# Patient Record
Sex: MALE | Race: WHITE
[De-identification: names, ages, dates, MRNs, and addresses within clinical notes are randomized per-mention and may not be internally consistent; named-entity substitution may affect disease eponyms.]

---

## 2017-05-15 ENCOUNTER — HOSPITAL ENCOUNTER (INPATIENT)
Dept: HOSPITAL 61 - ER | Age: 67
LOS: 2 days | Discharge: HOME | DRG: 605 | End: 2017-05-17
Attending: FAMILY MEDICINE | Admitting: FAMILY MEDICINE
Payer: COMMERCIAL

## 2017-05-15 VITALS — SYSTOLIC BLOOD PRESSURE: 117 MMHG | DIASTOLIC BLOOD PRESSURE: 79 MMHG

## 2017-05-15 VITALS — DIASTOLIC BLOOD PRESSURE: 81 MMHG | SYSTOLIC BLOOD PRESSURE: 104 MMHG

## 2017-05-15 VITALS — WEIGHT: 305.38 LBS | BODY MASS INDEX: 37.19 KG/M2 | HEIGHT: 76 IN

## 2017-05-15 DIAGNOSIS — R09.02: ICD-10-CM

## 2017-05-15 DIAGNOSIS — I48.2: ICD-10-CM

## 2017-05-15 DIAGNOSIS — I35.1: ICD-10-CM

## 2017-05-15 DIAGNOSIS — I25.10: ICD-10-CM

## 2017-05-15 DIAGNOSIS — E11.42: ICD-10-CM

## 2017-05-15 DIAGNOSIS — S00.03XA: Primary | ICD-10-CM

## 2017-05-15 DIAGNOSIS — I09.9: ICD-10-CM

## 2017-05-15 DIAGNOSIS — N28.1: ICD-10-CM

## 2017-05-15 DIAGNOSIS — I11.0: ICD-10-CM

## 2017-05-15 DIAGNOSIS — E78.5: ICD-10-CM
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
## 2017-05-15 DIAGNOSIS — Z82.49: ICD-10-CM

## 2017-05-15 DIAGNOSIS — I34.0: ICD-10-CM

## 2017-05-15 DIAGNOSIS — I50.9: ICD-10-CM
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
## 2017-05-15 DIAGNOSIS — Z88.0: ICD-10-CM

## 2017-05-15 DIAGNOSIS — M19.90: ICD-10-CM

## 2017-05-15 LAB
ALBUMIN SERPL-MCNC: 3.5 G/DL (ref 3.4–5)
ALBUMIN/GLOB SERPL: 1 {RATIO} (ref 1–1.7)
ALP SERPL-CCNC: 75 U/L (ref 46–116)
ALT SERPL-CCNC: 20 U/L (ref 16–63)
ANION GAP SERPL CALC-SCNC: 7 MMOL/L (ref 6–14)
APTT BLD: 29 SEC (ref 24–38)
AST SERPL-CCNC: 15 U/L (ref 15–37)
BACTERIA #/AREA URNS HPF: 0 /HPF
BARBITURATES UR-MCNC: (no result) UG/ML
BASOPHILS # BLD AUTO: 0.1 X10^3/UL (ref 0–0.2)
BASOPHILS NFR BLD: 1 % (ref 0–3)
BENZODIAZ UR-MCNC: (no result) UG/L
BILIRUB SERPL-MCNC: 0.4 MG/DL (ref 0.2–1)
BILIRUB UR QL STRIP: NEGATIVE
BUN SERPL-MCNC: 40 MG/DL (ref 8–26)
BUN/CREAT SERPL: 31 (ref 6–20)
CALCIUM SERPL-MCNC: 9.1 MG/DL (ref 8.5–10.1)
CANNABINOIDS UR-MCNC: (no result) UG/L
CHLORIDE SERPL-SCNC: 103 MMOL/L (ref 98–107)
CK SERPL-CCNC: 68 U/L (ref 39–308)
CO2 SERPL-SCNC: 31 MMOL/L (ref 21–32)
COCAINE UR-MCNC: (no result) NG/ML
CREAT SERPL-MCNC: 1.3 MG/DL (ref 0.7–1.3)
EOSINOPHIL NFR BLD: 3 % (ref 0–3)
ERYTHROCYTE [DISTWIDTH] IN BLOOD BY AUTOMATED COUNT: 14.4 % (ref 11.5–14.5)
ETHANOL SERPL-MCNC: < 10 MG/DL (ref 0–10)
GFR SERPLBLD BASED ON 1.73 SQ M-ARVRAT: 54.9 ML/MIN
GLOBULIN SER-MCNC: 3.6 G/DL (ref 2.2–3.8)
GLUCOSE SERPL-MCNC: 125 MG/DL (ref 70–99)
GLUCOSE UR STRIP-MCNC: NEGATIVE MG/DL
HCT VFR BLD CALC: 32.7 % (ref 39–53)
HGB BLD-MCNC: 11.1 G/DL (ref 13–17.5)
INR PPP: 1.2 (ref 0.8–1.1)
LYMPHOCYTES # BLD: 1 X10^3/UL (ref 1–4.8)
LYMPHOCYTES NFR BLD AUTO: 14 % (ref 24–48)
MAGNESIUM SERPL-MCNC: 1.6 MG/DL (ref 1.8–2.4)
MCH RBC QN AUTO: 29 PG (ref 25–35)
MCHC RBC AUTO-ENTMCNC: 34 G/DL (ref 31–37)
MCV RBC AUTO: 86 FL (ref 79–100)
METHADONE SERPL-MCNC: (no result) NG/ML
MONOCYTES NFR BLD: 8 % (ref 0–9)
NEUTROPHILS NFR BLD AUTO: 74 % (ref 31–73)
NITRITE UR QL STRIP: NEGATIVE
OPIATES UR-MCNC: (no result) NG/ML
PCP SERPL-MCNC: (no result) MG/DL
PH UR STRIP: 5 [PH]
PLATELET # BLD AUTO: 183 X10^3/UL (ref 140–400)
POTASSIUM SERPL-SCNC: 4.3 MMOL/L (ref 3.5–5.1)
PROT SERPL-MCNC: 7.1 G/DL (ref 6.4–8.2)
PROT UR STRIP-MCNC: NEGATIVE MG/DL
PROTHROMBIN TIME: 14.3 SEC (ref 11.7–14)
RBC # BLD AUTO: 3.82 X10^6/UL (ref 4.3–5.7)
RBC #/AREA URNS HPF: (no result) /HPF (ref 0–2)
SODIUM SERPL-SCNC: 141 MMOL/L (ref 136–145)
SP GR UR STRIP: 1.01
SQUAMOUS #/AREA URNS LPF: (no result) /LPF
UROBILINOGEN UR-MCNC: 0.2 MG/DL
WBC # BLD AUTO: 7 X10^3/UL (ref 4–11)
WBC #/AREA URNS HPF: (no result) /HPF (ref 0–4)

## 2017-05-15 PROCEDURE — 84484 ASSAY OF TROPONIN QUANT: CPT

## 2017-05-15 PROCEDURE — 85730 THROMBOPLASTIN TIME PARTIAL: CPT

## 2017-05-15 PROCEDURE — 80053 COMPREHEN METABOLIC PANEL: CPT

## 2017-05-15 PROCEDURE — 83735 ASSAY OF MAGNESIUM: CPT

## 2017-05-15 PROCEDURE — 84443 ASSAY THYROID STIM HORMONE: CPT

## 2017-05-15 PROCEDURE — 96374 THER/PROPH/DIAG INJ IV PUSH: CPT

## 2017-05-15 PROCEDURE — 93005 ELECTROCARDIOGRAM TRACING: CPT

## 2017-05-15 PROCEDURE — 36415 COLL VENOUS BLD VENIPUNCTURE: CPT

## 2017-05-15 PROCEDURE — 70450 CT HEAD/BRAIN W/O DYE: CPT

## 2017-05-15 PROCEDURE — 83690 ASSAY OF LIPASE: CPT

## 2017-05-15 PROCEDURE — 85610 PROTHROMBIN TIME: CPT

## 2017-05-15 PROCEDURE — 81001 URINALYSIS AUTO W/SCOPE: CPT

## 2017-05-15 PROCEDURE — 71275 CT ANGIOGRAPHY CHEST: CPT

## 2017-05-15 PROCEDURE — 83605 ASSAY OF LACTIC ACID: CPT

## 2017-05-15 PROCEDURE — 82947 ASSAY GLUCOSE BLOOD QUANT: CPT

## 2017-05-15 PROCEDURE — 85027 COMPLETE CBC AUTOMATED: CPT

## 2017-05-15 PROCEDURE — 83880 ASSAY OF NATRIURETIC PEPTIDE: CPT

## 2017-05-15 PROCEDURE — 74177 CT ABD & PELVIS W/CONTRAST: CPT

## 2017-05-15 PROCEDURE — 80048 BASIC METABOLIC PNL TOTAL CA: CPT

## 2017-05-15 PROCEDURE — 82550 ASSAY OF CK (CPK): CPT

## 2017-05-15 PROCEDURE — 72125 CT NECK SPINE W/O DYE: CPT

## 2017-05-15 RX ADMIN — FENTANYL CITRATE PRN MCG: 50 INJECTION INTRAMUSCULAR; INTRAVENOUS at 15:43
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
## 2017-05-15 NOTE — ACF
Admission Forms Criteria


                                           MENTAL STATUS CHANGE





   Clinical Indications for Inpatient Care    


                                                                     


                                                                 (Place 'X' for 

any and all applicable criteria):  





Ongoing inpatient care may be needed for 1 or more of the following(1)(2)(3)(5)(

6): 





[X]I.    Suspected serious etiology (eg, medical disorder, CNS event) of 

altered mental status


[ ]II.   Danger to self or others not manageable at lower level of care


[ ]III.  Grave disability (eg, inability to perform self care necessary at 

lower level of care)


[ ]IV. Agitation or inappropriate behavior interfering with care for primary 

condition (eg, attempting to discontinue


        lines or drains prematurely, unable to cooperate with respiratory care)


[ ]V.  Delirium [A] [D][E] as described by 1 or more of the following(26):


         [ ]a)  Delirium due to alcohol or sedative [F] withdrawal


         [ ]b)  Delirium of uncertain etiology that has not responded to 

appropriate empiric treatment


         [ ]c)  Delirium that prevents performance of a life-sustaining 

function (eg, feeding or hydrating oneself)





[ ]VI.  General contraindications and/or Inappropriate clinical situations for 

Observational Care in patients


          with Mental Status Change, when ANY ONE of the following is required:


          [ ]a)   Prediction of prolongation of LOS based on ANY ONE of the 

following may be considered as 


                    a contraindication for observational care 2, 3, 4, 5, 6, 7, 

8, 9, 10, 11


                    [ ]i)    Age > 65 yrs.


                    [ ]ii)   Patient arriving by ambulance


                    [ ]iii)  Patient with high acuity


                    [ ]iv)  Patient requiring vital sign monitoring


                    [ ]v)   Patient on IV medication


          [ ]b)   Systolic blood pressures  greater than or equal to 180mmHg 3,

12


          [ ]c)   Patient with altered mental status including delirium and 

other alteration of consciousness, (3)


          [ ]d)   Patient whose discharge disposition will be to a skilled 

nursing home or rehabilitation home should 


                   not be managed in Emergency Department Observation Unit. CMS 

rule requires 3 days hospital stay before such placement.3,13


          [ ]e)   Patient with failure to thrive due to broad array of 

etiologies 3,16,17


          [ ]f)   Inability to ambulate 3,14








Extended stay beyond goal length of stay for the primary condition may be 

needed until ALL of the following are present(3)(5):


[ ]a)  Underlying medical etiology of mental status change is absent, or has 

been established and adequately treated


[ ]b)  Danger to self or others is absent or manageable at lower level of care.


[ ]c)  Behavior crisis management, including physical or chemical restraints, 

is not required or available at lower level of car


[ ]d)  Substance or alcohol withdrawal is absent or manageable at lower level 

of care.


[ ]e)  Behavioral symptoms (eg, agitation, somnolence, inappropriate behavior) 

are absent, or are manageable at lower level of care.








The original [facility] [employer] content created by [facility] has been revised. 


The portions of the content which have been revised are identified through the 

use of italic text or in bold, and [facility] 


has neither reviewed nor approved the modified material. All other unmodified 

content is copyright  [employer] [facility].





Please see references footnoted in the original [employer] edition 

2016


Admission Criteria Met?:  Yes











NIK OLVERA May 15, 2017 14:50

## 2017-05-15 NOTE — EKG
[facility]

              [address] [place] [address]

Test Date:    2017-05-15               Test Time:    10:35:46

Pat Name:     NIKOLAI GOODRICH         Department:   

Patient ID:   [id number]           Room:         [facility]

Gender:       M                        Technician:   

:          [id number]               Requested By: DAPHNEY NAIR

Order Number: [id number]            Reading MD:   Aren Hankins

                                 Measurements

Intervals                              Axis          

Rate:         111                      P:            

CT:                                    QRS:          -67

QRSD:         110                      T:            7

QT:           368                                    

QTc:          504                                    

                           Interpretive Statements

ATRIAL FLUTTER

RBBB

POOR RWAVE PROGRESSION

CANNOT RULE OUT LATERAL WALL INFARCT



Electronically Signed On 2017 10:44:36 CDT by Aren Hankins

## 2017-05-16 VITALS — DIASTOLIC BLOOD PRESSURE: 80 MMHG | SYSTOLIC BLOOD PRESSURE: 111 MMHG

## 2017-05-16 VITALS — SYSTOLIC BLOOD PRESSURE: 107 MMHG | DIASTOLIC BLOOD PRESSURE: 83 MMHG

## 2017-05-16 VITALS — SYSTOLIC BLOOD PRESSURE: 140 MMHG | DIASTOLIC BLOOD PRESSURE: 103 MMHG

## 2017-05-16 VITALS — SYSTOLIC BLOOD PRESSURE: 140 MMHG | DIASTOLIC BLOOD PRESSURE: 68 MMHG

## 2017-05-16 VITALS — DIASTOLIC BLOOD PRESSURE: 84 MMHG | SYSTOLIC BLOOD PRESSURE: 113 MMHG

## 2017-05-16 VITALS — SYSTOLIC BLOOD PRESSURE: 128 MMHG | DIASTOLIC BLOOD PRESSURE: 93 MMHG

## 2017-05-16 VITALS — DIASTOLIC BLOOD PRESSURE: 78 MMHG | SYSTOLIC BLOOD PRESSURE: 117 MMHG

## 2017-05-16 LAB
ANION GAP SERPL CALC-SCNC: 3 MMOL/L (ref 6–14)
BASOPHILS # BLD AUTO: 0 X10^3/UL (ref 0–0.2)
BASOPHILS NFR BLD: 1 % (ref 0–3)
BUN SERPL-MCNC: 35 MG/DL (ref 8–26)
CALCIUM SERPL-MCNC: 8.7 MG/DL (ref 8.5–10.1)
CHLORIDE SERPL-SCNC: 104 MMOL/L (ref 98–107)
CO2 SERPL-SCNC: 34 MMOL/L (ref 21–32)
CREAT SERPL-MCNC: 1.2 MG/DL (ref 0.7–1.3)
EOSINOPHIL NFR BLD: 3 % (ref 0–3)
ERYTHROCYTE [DISTWIDTH] IN BLOOD BY AUTOMATED COUNT: 14.4 % (ref 11.5–14.5)
GFR SERPLBLD BASED ON 1.73 SQ M-ARVRAT: 60.2 ML/MIN
GLUCOSE SERPL-MCNC: 132 MG/DL (ref 70–99)
HCT VFR BLD CALC: 30.2 % (ref 39–53)
HGB BLD-MCNC: 10.2 G/DL (ref 13–17.5)
LYMPHOCYTES # BLD: 1.2 X10^3/UL (ref 1–4.8)
LYMPHOCYTES NFR BLD AUTO: 18 % (ref 24–48)
MCH RBC QN AUTO: 29 PG (ref 25–35)
MCHC RBC AUTO-ENTMCNC: 34 G/DL (ref 31–37)
MCV RBC AUTO: 85 FL (ref 79–100)
MONOCYTES NFR BLD: 8 % (ref 0–9)
NEUTROPHILS NFR BLD AUTO: 70 % (ref 31–73)
PLATELET # BLD AUTO: 152 X10^3/UL (ref 140–400)
POTASSIUM SERPL-SCNC: 4.1 MMOL/L (ref 3.5–5.1)
RBC # BLD AUTO: 3.54 X10^6/UL (ref 4.3–5.7)
SODIUM SERPL-SCNC: 141 MMOL/L (ref 136–145)
WBC # BLD AUTO: 6.5 X10^3/UL (ref 4–11)

## 2017-05-16 RX ADMIN — ACETAMINOPHEN SCH MG: 650 SOLUTION ORAL at 16:54

## 2017-05-16 RX ADMIN — SOTALOL HYDROCHLORIDE SCH MG: 80 TABLET ORAL at 09:05

## 2017-05-16 RX ADMIN — ACETAMINOPHEN SCH MG: 650 SOLUTION ORAL at 12:00

## 2017-05-16 RX ADMIN — POTASSIUM CHLORIDE SCH MEQ: 1500 TABLET, EXTENDED RELEASE ORAL at 16:54

## 2017-05-16 RX ADMIN — ACETAMINOPHEN SCH MG: 650 SOLUTION ORAL at 09:04

## 2017-05-16 RX ADMIN — PANTOPRAZOLE SODIUM SCH MG: 40 TABLET, DELAYED RELEASE ORAL at 16:54

## 2017-05-16 RX ADMIN — SERTRALINE HYDROCHLORIDE SCH MG: 50 TABLET ORAL at 09:06

## 2017-05-16 RX ADMIN — FUROSEMIDE SCH MG: 80 TABLET ORAL at 09:05

## 2017-05-16 RX ADMIN — ACETAMINOPHEN SCH MG: 650 SOLUTION ORAL at 20:37

## 2017-05-16 RX ADMIN — SOTALOL HYDROCHLORIDE SCH MG: 80 TABLET ORAL at 20:38

## 2017-05-16 RX ADMIN — FENTANYL CITRATE PRN MCG: 50 INJECTION INTRAMUSCULAR; INTRAVENOUS at 05:27

## 2017-05-16 RX ADMIN — POTASSIUM CHLORIDE SCH MEQ: 1500 TABLET, EXTENDED RELEASE ORAL at 09:06

## 2017-05-16 RX ADMIN — PANTOPRAZOLE SODIUM SCH MG: 40 TABLET, DELAYED RELEASE ORAL at 09:05
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
## 2017-05-17 VITALS — DIASTOLIC BLOOD PRESSURE: 85 MMHG | SYSTOLIC BLOOD PRESSURE: 130 MMHG

## 2017-05-17 VITALS — SYSTOLIC BLOOD PRESSURE: 149 MMHG | DIASTOLIC BLOOD PRESSURE: 101 MMHG

## 2017-05-17 VITALS — DIASTOLIC BLOOD PRESSURE: 101 MMHG | SYSTOLIC BLOOD PRESSURE: 149 MMHG

## 2017-05-17 RX ADMIN — SOTALOL HYDROCHLORIDE SCH MG: 80 TABLET ORAL at 08:05

## 2017-05-17 RX ADMIN — FUROSEMIDE SCH MG: 80 TABLET ORAL at 08:03

## 2017-05-17 RX ADMIN — PANTOPRAZOLE SODIUM SCH MG: 40 TABLET, DELAYED RELEASE ORAL at 08:04

## 2017-05-17 RX ADMIN — SERTRALINE HYDROCHLORIDE SCH MG: 50 TABLET ORAL at 08:04

## 2017-05-17 RX ADMIN — ACETAMINOPHEN SCH MG: 650 SOLUTION ORAL at 08:03

## 2017-05-17 RX ADMIN — POTASSIUM CHLORIDE SCH MEQ: 1500 TABLET, EXTENDED RELEASE ORAL at 08:03

## 2017-05-17 RX ADMIN — ACETAMINOPHEN SCH MG: 650 SOLUTION ORAL at 00:00

## 2017-05-17 RX ADMIN — ACETAMINOPHEN SCH MG: 650 SOLUTION ORAL at 03:35
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
## 2017-05-17 NOTE — DISCH
DISCHARGE INSTRUCTIONS


Condition on Discharge


Condition on Discharge:  Stable





Activity After Discharge


Activity Instructions for Disc:  No restrictions





Diet after Discharge


Diet after Discharge:  Cardiac





Follow-Up


Follow up with:  as scheduled











DAPHNEY NAIR MD May 17, 2017 08:20

## 2017-06-03 ENCOUNTER — HOSPITAL ENCOUNTER (INPATIENT)
Dept: HOSPITAL 61 - ER | Age: 67
LOS: 11 days | Discharge: HOME HEALTH | DRG: 252 | End: 2017-06-14
Attending: FAMILY MEDICINE | Admitting: FAMILY MEDICINE
Payer: MEDICARE

## 2017-06-03 VITALS — DIASTOLIC BLOOD PRESSURE: 79 MMHG | SYSTOLIC BLOOD PRESSURE: 111 MMHG

## 2017-06-03 VITALS — DIASTOLIC BLOOD PRESSURE: 86 MMHG | SYSTOLIC BLOOD PRESSURE: 127 MMHG

## 2017-06-03 VITALS — SYSTOLIC BLOOD PRESSURE: 111 MMHG | DIASTOLIC BLOOD PRESSURE: 79 MMHG

## 2017-06-03 VITALS — DIASTOLIC BLOOD PRESSURE: 96 MMHG | SYSTOLIC BLOOD PRESSURE: 123 MMHG

## 2017-06-03 VITALS — DIASTOLIC BLOOD PRESSURE: 76 MMHG | SYSTOLIC BLOOD PRESSURE: 112 MMHG

## 2017-06-03 VITALS — DIASTOLIC BLOOD PRESSURE: 88 MMHG | SYSTOLIC BLOOD PRESSURE: 132 MMHG

## 2017-06-03 VITALS — DIASTOLIC BLOOD PRESSURE: 71 MMHG | SYSTOLIC BLOOD PRESSURE: 110 MMHG

## 2017-06-03 VITALS — DIASTOLIC BLOOD PRESSURE: 82 MMHG | SYSTOLIC BLOOD PRESSURE: 100 MMHG

## 2017-06-03 VITALS — WEIGHT: 300.12 LBS | HEIGHT: 75 IN | BODY MASS INDEX: 37.32 KG/M2

## 2017-06-03 DIAGNOSIS — Z79.2: ICD-10-CM

## 2017-06-03 DIAGNOSIS — Z79.1: ICD-10-CM

## 2017-06-03 DIAGNOSIS — E78.5: ICD-10-CM

## 2017-06-03 DIAGNOSIS — I48.92: ICD-10-CM

## 2017-06-03 DIAGNOSIS — R29.6: ICD-10-CM

## 2017-06-03 DIAGNOSIS — N17.0: ICD-10-CM

## 2017-06-03 DIAGNOSIS — N18.9: ICD-10-CM

## 2017-06-03 DIAGNOSIS — I35.1: ICD-10-CM

## 2017-06-03 DIAGNOSIS — Z79.84: ICD-10-CM

## 2017-06-03 DIAGNOSIS — E66.9: ICD-10-CM

## 2017-06-03 DIAGNOSIS — Z79.899: ICD-10-CM

## 2017-06-03 DIAGNOSIS — Z79.82: ICD-10-CM

## 2017-06-03 DIAGNOSIS — R55: ICD-10-CM

## 2017-06-03 DIAGNOSIS — J40: ICD-10-CM

## 2017-06-03 DIAGNOSIS — I25.10: ICD-10-CM

## 2017-06-03 DIAGNOSIS — Z87.891: ICD-10-CM

## 2017-06-03 DIAGNOSIS — I95.9: ICD-10-CM

## 2017-06-03 DIAGNOSIS — I21.4: Primary | ICD-10-CM

## 2017-06-03 DIAGNOSIS — I47.2: ICD-10-CM

## 2017-06-03 DIAGNOSIS — I50.9: ICD-10-CM

## 2017-06-03 DIAGNOSIS — I48.2: ICD-10-CM

## 2017-06-03 DIAGNOSIS — Z82.49: ICD-10-CM

## 2017-06-03 DIAGNOSIS — E11.22: ICD-10-CM

## 2017-06-03 DIAGNOSIS — I13.0: ICD-10-CM

## 2017-06-03 DIAGNOSIS — Z88.0: ICD-10-CM

## 2017-06-03 DIAGNOSIS — I45.89: ICD-10-CM

## 2017-06-03 DIAGNOSIS — E11.51: ICD-10-CM

## 2017-06-03 LAB
ALBUMIN SERPL-MCNC: 4 G/DL (ref 3.4–5)
ALP SERPL-CCNC: 112 U/L (ref 46–116)
ALT SERPL-CCNC: 40 U/L (ref 16–63)
ANION GAP SERPL CALC-SCNC: 11 MMOL/L (ref 6–14)
AST SERPL-CCNC: 44 U/L (ref 15–37)
BASOPHILS # BLD AUTO: 0.1 X10^3/UL (ref 0–0.2)
BASOPHILS NFR BLD: 1 % (ref 0–3)
BILIRUB DIRECT SERPL-MCNC: 0.4 MG/DL (ref 0–0.2)
BILIRUB SERPL-MCNC: 0.7 MG/DL (ref 0.2–1)
BUN SERPL-MCNC: 37 MG/DL (ref 8–26)
CALCIUM SERPL-MCNC: 9.1 MG/DL (ref 8.5–10.1)
CHLORIDE SERPL-SCNC: 102 MMOL/L (ref 98–107)
CO2 BLD-SCNC: 26 MMOL/L (ref 23–32)
CO2 SERPL-SCNC: 29 MMOL/L (ref 21–32)
CREAT SERPL-MCNC: 1.6 MG/DL (ref 0.7–1.3)
EOSINOPHIL NFR BLD: 2 % (ref 0–3)
ERYTHROCYTE [DISTWIDTH] IN BLOOD BY AUTOMATED COUNT: 14.2 % (ref 11.5–14.5)
GFR SERPLBLD BASED ON 1.73 SQ M-ARVRAT: 43.2 ML/MIN
GLUCOSE BLD-MCNC: 197 MG/DL (ref 70–99)
GLUCOSE SERPL-MCNC: 196 MG/DL (ref 70–99)
HCT VFR BLD AUTO: 37 % (ref 37–52)
HCT VFR BLD CALC: 37.3 % (ref 39–53)
HGB BLD-MCNC: 12.4 G/DL (ref 13–17.5)
LYMPHOCYTES # BLD: 1.3 X10^3/UL (ref 1–4.8)
LYMPHOCYTES NFR BLD AUTO: 12 % (ref 24–48)
MAGNESIUM SERPL-MCNC: 1.9 MG/DL (ref 1.8–2.4)
MCH RBC QN AUTO: 29 PG (ref 25–35)
MCHC RBC AUTO-ENTMCNC: 33 G/DL (ref 31–37)
MCV RBC AUTO: 87 FL (ref 79–100)
MONOCYTES NFR BLD: 6 % (ref 0–9)
NEUTROPHILS NFR BLD AUTO: 79 % (ref 31–73)
PLATELET # BLD AUTO: 230 X10^3/UL (ref 140–400)
POTASSIUM BLD-SCNC: 4.3 MMOL/L (ref 3.5–5)
POTASSIUM SERPL-SCNC: 4.5 MMOL/L (ref 3.5–5.1)
PROT SERPL-MCNC: 7.5 G/DL (ref 6.4–8.2)
RBC # BLD AUTO: 4.32 X10^6/UL (ref 4.3–5.7)
SODIUM SERPL-SCNC: 139 MMOL/L (ref 135–145)
SODIUM SERPL-SCNC: 142 MMOL/L (ref 136–145)
WBC # BLD AUTO: 10.3 X10^3/UL (ref 4–11)

## 2017-06-03 PROCEDURE — 80048 BASIC METABOLIC PNL TOTAL CA: CPT

## 2017-06-03 PROCEDURE — 80076 HEPATIC FUNCTION PANEL: CPT

## 2017-06-03 PROCEDURE — 93458 L HRT ARTERY/VENTRICLE ANGIO: CPT

## 2017-06-03 PROCEDURE — 81001 URINALYSIS AUTO W/SCOPE: CPT

## 2017-06-03 PROCEDURE — 83880 ASSAY OF NATRIURETIC PEPTIDE: CPT

## 2017-06-03 PROCEDURE — C1771 REP DEV, URINARY, W/SLING: HCPCS

## 2017-06-03 PROCEDURE — 80047 BASIC METABLC PNL IONIZED CA: CPT

## 2017-06-03 PROCEDURE — 70553 MRI BRAIN STEM W/O & W/DYE: CPT

## 2017-06-03 PROCEDURE — G0269 OCCLUSIVE DEVICE IN VEIN ART: HCPCS

## 2017-06-03 PROCEDURE — 96375 TX/PRO/DX INJ NEW DRUG ADDON: CPT

## 2017-06-03 PROCEDURE — 93306 TTE W/DOPPLER COMPLETE: CPT

## 2017-06-03 PROCEDURE — 84443 ASSAY THYROID STIM HORMONE: CPT

## 2017-06-03 PROCEDURE — C1769 GUIDE WIRE: HCPCS

## 2017-06-03 PROCEDURE — 85027 COMPLETE CBC AUTOMATED: CPT

## 2017-06-03 PROCEDURE — 85651 RBC SED RATE NONAUTOMATED: CPT

## 2017-06-03 PROCEDURE — 93005 ELECTROCARDIOGRAM TRACING: CPT

## 2017-06-03 PROCEDURE — 83735 ASSAY OF MAGNESIUM: CPT

## 2017-06-03 PROCEDURE — 36415 COLL VENOUS BLD VENIPUNCTURE: CPT

## 2017-06-03 PROCEDURE — 83036 HEMOGLOBIN GLYCOSYLATED A1C: CPT

## 2017-06-03 PROCEDURE — 96365 THER/PROPH/DIAG IV INF INIT: CPT

## 2017-06-03 PROCEDURE — 82962 GLUCOSE BLOOD TEST: CPT

## 2017-06-03 PROCEDURE — 80061 LIPID PANEL: CPT

## 2017-06-03 PROCEDURE — 88305 TISSUE EXAM BY PATHOLOGIST: CPT

## 2017-06-03 PROCEDURE — 70450 CT HEAD/BRAIN W/O DYE: CPT

## 2017-06-03 PROCEDURE — 71010: CPT

## 2017-06-03 PROCEDURE — C1887 CATHETER, GUIDING: HCPCS

## 2017-06-03 PROCEDURE — 93571 IV DOP VEL&/PRESS C FLO 1ST: CPT

## 2017-06-03 PROCEDURE — C1892 INTRO/SHEATH,FIXED,PEEL-AWAY: HCPCS

## 2017-06-03 PROCEDURE — 87641 MR-STAPH DNA AMP PROBE: CPT

## 2017-06-03 PROCEDURE — 84484 ASSAY OF TROPONIN QUANT: CPT

## 2017-06-03 RX ADMIN — METOPROLOL TARTRATE SCH MG: 5 INJECTION INTRAVENOUS at 20:39

## 2017-06-03 RX ADMIN — INSULIN ASPART SCH UNITS: 100 INJECTION, SOLUTION INTRAVENOUS; SUBCUTANEOUS at 17:00

## 2017-06-03 NOTE — RAD
Indication: Chest pain.



Time of exam 12:16 PM



Correlation is made with prior chest from 4/6/2016.



The heart is enlarged but stable. The lungs are clear of acute infiltrates. No

failure is seen. There is no effusion or pneumothorax. Calcified granuloma

right lower lobe is stable.



Impression: Stable chest. No acute feature is detected.

## 2017-06-03 NOTE — EKG
[facility]

              [address] [place] [address]

Test Date:    2017               Test Time:    12:02:46

Pat Name:     NIKOLAI GOODRICH         Department:   

Patient ID:   [id number]           Room:          

Gender:       M                        Technician:   

:          [id number]               Requested By: MAHNAZ MCCRACKEN

Order Number: [id number]            Reading MD:   Jatin Saha

                                 Measurements

Intervals                              Axis          

Rate:         114                      P:            67

MT:           60                       QRS:          -67

QRSD:         122                      T:            3

QT:           364                                    

QTc:          506                                    

                           Interpretive Statements

SINUS TACHYCARDIA

ABNORMAL LEFT AXIS DEVIATION

LEFT ANTERIOR FASCICULAR BLOCK

LVH WITH REPOLARIZATION ABNORMALITY

NONSPECIFIC ST-T WAVE CHANGES.

RI6.01          Unconfirmed report

Compared to ECG 05/15/2017 10:35:46



Electronically Signed On 2017 9:41:19 CDT by Jatin Saha

## 2017-06-03 NOTE — ED.ADGEN
Past Medical History


Past Medical History:  A-Fib, CHF, Diabetes-Type II, Heart Disease


Additional Past Medical Histor:  AORTIC VALVE LEAK


Past Surgical History:  Tonsillectomy


Additional Past Surgical Histo:  CARDIAC CATH W NO STENT PER PATIENT


Alcohol Use:  None


Drug Use:  None





Adult General


Chief Complaint


Chief Complaint:  CHEST PAIN





HPI


HPI





Patient is a 67  year old woman, history of type 2 diabetes mellitus, atrial 

fibrillation, atrial flutter, CHF, aortic valve leak, who presents to the 

emergency department via EMS with report of chest pain and flutter, with a 

concern for a run of ventricular tachycardia area patient became diaphoretic, 

and minimally responsive en route to the emergency department, they state that 

they witnessed a brief run of ventricular tachycardia, however patient became 

alert again, never lost pulses, and went back to with a sinus tachycardia 

without intervention. Patient was initiated and amiodarone by EMS, before they 

lost a line en route. There was no infiltration. Patient upon arousing the ED 

is complaining of midsternal chest pain, he is noted to be pale and diaphoretic

, hypotensive, 80s over 50s, heart rate in the 1 teens, in atrial flutter. 

Patient states that he was feeling well up until about 8:00 this morning and 

began experiencing a fluttering sensation in his chest, and a feeling of chest 

pain. He states he has had similar symptoms previously associated with his 

cardiac issues. His cardiologist is Dr. Leiva. He states that his wife 

gives them although his medications but he believes to, patient's stay already, 

including a full dose of aspirin. Complains of mild dizziness and nausea, and 

as stated had a brief episode of possible syncope en route to the emergency 

department. He is complaining of bilateral ear pain, mild headache, denies any 

weakness, numbness or tingling. Patient's family is en route to the emergency 

department.





Review of Systems


Review of Systems


Constitutional:  Denies fever or chills. []


Eyes:  Denies change in visual acuity. []


HENT:  Denies nasal congestion or sore throat. [] 


Respiratory:  Denies cough or shortness of breath. [] 


Cardiovascular: Chest pain, no edema. Complaining of "fluttering in the chest".


GI:  Denies abdominal pain, nausea, vomiting, bloody stools or diarrhea. [] 


:  Denies dysuria. [] 


Musculoskeletal:  Denies back pain or joint pain. [] 


Integument:  Denies rash. [] 


Neurologic:  Denies headache, focal weakness or sensory changes. [] 


Endocrine:  Denies polyuria or polydipsia. [] 


Lymphatic:  Denies swollen glands. [] 


Psychiatric:  Denies depression or anxiety. []





Current Medications


Current Medications





Current Medications








 Medications


  (Trade)  Dose


 Ordered  Sig/Shelby  Start Time


 Stop Time Status Last Admin


Dose Admin


 


 Amiodarone HCl


 150 mg/Dextrose  103 ml @ 


 618 mls/hr  1X  ONCE  6/3/17 12:15


 6/3/17 12:24 DC 6/3/17 12:24


618 MLS/HR


 


 Amiodarone HCl


 900 mg/Dextrose  518 ml @ 0


 mls/hr  CONT  PRN  6/3/17 12:15


 6/3/17 12:35 DC 6/3/17 12:34


33 MLS/HR


 


 Aspirin


  (Children'S


 Aspirin)  324 mg  1X  ONCE  6/3/17 12:15


 6/3/17 12:16 DC 6/3/17 12:19


324 MG


 


 Digoxin


  (Lanoxin)  500 mcg  1X  ONCE  6/3/17 13:00


 6/3/17 13:01 DC 6/3/17 12:50


500 MCG


 


 Fentanyl Citrate


  (Fentanyl 2ml


 Vial)  25 mcg  PRN Q15MIN  PRN  6/3/17 12:15


 6/3/17 15:34 DC 6/3/17 12:27


25 MCG


 


 Nitroglycerin


  (Nitrostat)  0.4 mg  PRN Q5MIN  PRN  6/3/17 12:15


     


 


 


 Sodium Chloride  500 ml @ 


 500 mls/hr  1X  ONCE  6/3/17 12:45


 6/3/17 13:44 DC  


 











Allergies


Allergies





Allergies








Coded Allergies Type Severity Reaction Last Updated Verified


 


  Penicillins Allergy Severe RASH AND HIVES 16 Yes











Physical Exam


Physical Exam





Constitutional: Well developed, well nourished, ill in appearance, pale and 

diaphoretic. []


HENT: Normocephalic, atraumatic, bilateral external ears normal, oropharynx 

moist, no oral exudates, nose normal. []


Eyes: PERRLA, EOMI, conjunctiva normal, no discharge. [] 


Neck: Normal range of motion, no tenderness, supple, no stridor. [] 


Cardiovascular:Heart rate regular rhythm, 3-5 systolic murmur, S1, S2, rapid. 

No rubs, no gallops.


Lungs & Thorax: Diminished breath sounds at bases bilaterally, no rhonchi or 

rales. []


Abdomen: Bowel sounds normal, soft, obese, no rebound, rigidity, no guarding, 

no tenderness, no masses, no pulsatile masses. [] 


Skin: Diaphoretic, pale, no erythema, no rash. [] 


Back: No tenderness, no CVA tenderness. [] 


Extremities: No tenderness, no cyanosis, no clubbing, ROM intact, no edema. [] 


Neurologic: Patient is drowsy, but easily aroused, oriented X 3, normal motor 

function, normal sensory function, no focal deficits noted. []


Psychologic: Affect normal, judgement normal, mood normal. []





Current Patient Data


Vital Signs





 Vital Signs








  Date Time  Temp Pulse Resp B/P (MAP) Pulse Ox O2 Delivery O2 Flow Rate FiO2


 


6/3/17 12:50    90/64    


 


6/3/17 12:27   16  94 Room Air  


 


6/3/17 12:24  117      


 


6/3/17 12:00 98.2       





 98.2       








Lab Values





 Laboratory Tests








Test


  6/3/17


12:05 6/3/17


12:20


 


White Blood Count


  10.3 x10^3/uL


(4.0-11.0) 


 


 


Red Blood Count


  4.32 x10^6/uL


(4.30-5.70) 


 


 


Hemoglobin


  12.4 g/dL


(13.0-17.5)  L 


 


 


Hematocrit


  37.3 %


(39.0-53.0)  L 


 


 


Mean Corpuscular Volume


  87 fL ()


  


 


 


Mean Corpuscular Hemoglobin 29 pg (25-35)   


 


Mean Corpuscular Hemoglobin


Concent 33 g/dL


(31-37) 


 


 


Red Cell Distribution Width


  14.2 %


(11.5-14.5) 


 


 


Platelet Count


  230 x10^3/uL


(140-400) 


 


 


Neutrophils (%) (Auto) 79 % (31-73)  H 


 


Lymphocytes (%) (Auto) 12 % (24-48)  L 


 


Monocytes (%) (Auto) 6 % (0-9)   


 


Eosinophils (%) (Auto) 2 % (0-3)   


 


Basophils (%) (Auto) 1 % (0-3)   


 


Neutrophils # (Auto)


  8.2 x10^3uL


(1.8-7.7)  H 


 


 


Lymphocytes # (Auto)


  1.3 x10^3/uL


(1.0-4.8) 


 


 


Monocytes # (Auto)


  0.6 x10^3/uL


(0.0-1.1) 


 


 


Eosinophils # (Auto)


  0.2 x10^3/uL


(0.0-0.7) 


 


 


Basophils # (Auto)


  0.1 x10^3/uL


(0.0-0.2) 


 


 


Sodium Level


  142 mmol/L


(136-145) 


 


 


Potassium Level


  4.5 mmol/L


(3.5-5.1) 


 


 


Chloride Level


  102 mmol/L


() 


 


 


Carbon Dioxide Level


  29 mmol/L


(21-32) 


 


 


Anion Gap


  11 (6-14)  


  18 mmol/L


(6-14)  H


 


Blood Urea Nitrogen


  37 mg/dL


(8-26)  H 


 


 


Creatinine


  1.6 mg/dL


(0.7-1.3)  H 


 


 


Estimated GFR


(Cockcroft-Gault) 43.2  


  


 


 


Glucose Level


  196 mg/dL


(70-99)  H 197 mg/dL


(70-99)  H


 


Calcium Level


  9.1 mg/dL


(8.5-10.1) 


 


 


Magnesium Level


  1.9 mg/dL


(1.8-2.4) 


 


 


Total Bilirubin


  0.7 mg/dL


(0.2-1.0) 


 


 


Direct Bilirubin


  0.4 mg/dL


(0.0-0.2)  H 


 


 


Aspartate Amino Transferase


(AST) 44 U/L (15-37)


H 


 


 


Alanine Aminotransferase (ALT)


  40 U/L (16-63)


  


 


 


Alkaline Phosphatase


  112 U/L


() 


 


 


Troponin I Quantitative


  0.017 ng/mL


(0.000-0.055) 


 


 


NT-Pro-B-Type Natriuretic


Peptide 2232 pg/mL


(0-124)  H 


 


 


Total Protein


  7.5 g/dL


(6.4-8.2) 


 


 


Albumin


  4.0 g/dL


(3.4-5.0) 


 


 


POC Hemoglobin


  


  12.6 g/dL


(14-18)  L


 


POC Hematocrit  37 % (37-52)  


 


POC Sodium


  


  139 mmol/L


(135-145)


 


POC Potassium


  


  4.3 mmol/L


(3.5-5.0)


 


POC Chloride


  


  100 mmol/L


()


 


POC Total CO2


  


  26 mmol/L


(23-32)


 


POC Blood Urea Nitrogen


  


  35 mg/dL


(8-26)  H


 


POC Creatinine


  


  1.6 mg/dL


(0.5-1.4)  H


 


POC Ionized Calcium ([place])


  


  1.16 mmol/L


(1.13-1.32)


 


POC Troponin I


  


  0.00 ng/ml


(<0.08)





 Laboratory Tests


6/3/17 12:05








 Laboratory Tests


6/3/17 12:05








6/3/17 12:20














EKG


EKG


ECG: EMS 12-lead: Patient noted to have possible ventricular tachycardia on 

rhythm strip. 





EC: Atrial flutter, with right bundle branch block noted, left axis 

deviation, QTC of 506, PA 2, QRS of 122, abnormal ECG, does not meet STEMI 

criteria. As interpreted by me. 





[EC: Atrial fibrillation, heart rate 115 bpm, QTC of 503, PA 58, QRS of 

116, left jugular hypertrophy noted, abnormal ECG, does not meet STEMI criteria.





Went for above EKGs are compared to previous ECG from 5 15,017, morphology is 

overall preserved, with atrial flutter pattern again identified.





Radiology/Procedures


Radiology/Procedures


[][facility]


 [address] [place] [address]


 [phone]


 


 IMAGING REPORT





 Signed





PATIENT: NIKOLAI GOODRICH ACCOUNT: [id number] MRN#: [id number]


: 1950 LOCATION: ER AGE: 67


SEX: M EXAM DT: 17 ACCESSION#: [id number]


STATUS: PRE ER ORD. PHYSICIAN: MAHNAZ MCCRACKEN DO 


REASON: CP


PROCEDURE: PORTABLE CHEST 1V





Indication: Chest pain.





Time of exam 12:16 PM





Correlation is made with prior chest from 2016.





The heart is enlarged but stable. The lungs are clear of acute infiltrates. No


failure is seen. There is no effusion or pneumothorax. Calcified granuloma


right lower lobe is stable.





Impression: Stable chest. No acute feature is detected.














DICTATED and SIGNED BY:     LETICIA HUTSON MD


DATE:     17 1037





CC: DAPHNEY NAIR MD; MAHNAZ MCCRACKEN DO ~





Course & Med Decision Making


Course & Med Decision Making


Pertinent Labs and Imaging studies reviewed. (See chart for details)





Upon arrival, patient was pale and diaphoretic, noted to be hypotensive 80s 

over 50s, with heart rate in the 1 teens, atrial flutter. IV fluids were 

initiated, as was amiodarone infusion. Patient was placed on the Zoll pads, 

however his mentation and coloration rapidly improved, along with his blood 

pressure. Blood pressure is now 90s over 60s, heart rate is still in the 1 

teens atrial flutter, patient is awake, alert, oriented, stating that his chest 

pain is fully resolved of receiving a dose of fentanyl in the emergency 

department. Review of ECGs as stated, patient remains in atrial flutter, with 

concern for a run of ventricular tachycardia en route to the ED. Laboratory 

studies reveal a creatinine of 1.6, without any significant electrolyte 

abnormalities, troponin is negative. Findings as above discussed with Dr. Omalley on-call for the patient's primary allergist, Dr. Leiva, patient 

has received amiodarone, and a dose of digoxin 500 g in the ED, and is resting 

comfortably, heart rate is now at 111 atrial flutter, blood pressure is 102/69, 

and patient is resting comfortably and denying all complaints at this time. 

Family is at bedside. She is agreeable with the single dose of digoxin and plan 

to continue the amiodarone drip, with admission to the ICU, where she will all 

the patient closely. At this time there is no indication for intervention. I 

did discuss with the patient and family at bedside, they are agreeable with 

this plan. Findings as above discussed with Dr. Pinedo, is on-call for the patient

's primary care provider Dr. Nair, patient accepted to his service as a full 

[place] or the ICU, with cardiology consultation and close monitoring as 

stated. His blood pressure improved to 1 teens over 70s, heart rate 90s to low 

100s and atrial flutter, denying complaints, at time of transfer to the ICU.





Dragon Disclaimer


Dragon Disclaimer


This electronic medical record was generated, in whole or in part, using a 

voice recognition dictation system.





Departure


Impression:  


 Primary Impression:  


 Chest pain


 Additional Impression:  


 Arrhythmia


Disposition:  09 ADMITTED AS INPATIENT


Admitting Physician:  Brian Pinedo


Condition:  IMPROVED





Problem Qualifiers











MAHNAZ MCCRACKEN DO [place] 3, 2017 14:23

## 2017-06-04 VITALS — SYSTOLIC BLOOD PRESSURE: 119 MMHG | DIASTOLIC BLOOD PRESSURE: 80 MMHG

## 2017-06-04 VITALS — DIASTOLIC BLOOD PRESSURE: 70 MMHG | SYSTOLIC BLOOD PRESSURE: 119 MMHG

## 2017-06-04 VITALS — DIASTOLIC BLOOD PRESSURE: 78 MMHG | SYSTOLIC BLOOD PRESSURE: 105 MMHG

## 2017-06-04 VITALS — DIASTOLIC BLOOD PRESSURE: 73 MMHG | SYSTOLIC BLOOD PRESSURE: 110 MMHG

## 2017-06-04 VITALS — SYSTOLIC BLOOD PRESSURE: 114 MMHG | DIASTOLIC BLOOD PRESSURE: 80 MMHG

## 2017-06-04 VITALS — DIASTOLIC BLOOD PRESSURE: 72 MMHG | SYSTOLIC BLOOD PRESSURE: 103 MMHG

## 2017-06-04 VITALS — SYSTOLIC BLOOD PRESSURE: 116 MMHG | DIASTOLIC BLOOD PRESSURE: 76 MMHG

## 2017-06-04 VITALS — SYSTOLIC BLOOD PRESSURE: 113 MMHG | DIASTOLIC BLOOD PRESSURE: 71 MMHG

## 2017-06-04 VITALS — SYSTOLIC BLOOD PRESSURE: 111 MMHG | DIASTOLIC BLOOD PRESSURE: 73 MMHG

## 2017-06-04 VITALS — SYSTOLIC BLOOD PRESSURE: 124 MMHG | DIASTOLIC BLOOD PRESSURE: 83 MMHG

## 2017-06-04 VITALS — SYSTOLIC BLOOD PRESSURE: 121 MMHG | DIASTOLIC BLOOD PRESSURE: 79 MMHG

## 2017-06-04 VITALS — SYSTOLIC BLOOD PRESSURE: 109 MMHG | DIASTOLIC BLOOD PRESSURE: 74 MMHG

## 2017-06-04 VITALS — DIASTOLIC BLOOD PRESSURE: 97 MMHG | SYSTOLIC BLOOD PRESSURE: 133 MMHG

## 2017-06-04 VITALS — DIASTOLIC BLOOD PRESSURE: 89 MMHG | SYSTOLIC BLOOD PRESSURE: 125 MMHG

## 2017-06-04 VITALS — DIASTOLIC BLOOD PRESSURE: 82 MMHG | SYSTOLIC BLOOD PRESSURE: 110 MMHG

## 2017-06-04 VITALS — DIASTOLIC BLOOD PRESSURE: 86 MMHG | SYSTOLIC BLOOD PRESSURE: 116 MMHG

## 2017-06-04 VITALS — SYSTOLIC BLOOD PRESSURE: 119 MMHG | DIASTOLIC BLOOD PRESSURE: 81 MMHG

## 2017-06-04 VITALS — DIASTOLIC BLOOD PRESSURE: 81 MMHG | SYSTOLIC BLOOD PRESSURE: 119 MMHG

## 2017-06-04 VITALS — DIASTOLIC BLOOD PRESSURE: 69 MMHG | SYSTOLIC BLOOD PRESSURE: 107 MMHG

## 2017-06-04 VITALS — DIASTOLIC BLOOD PRESSURE: 75 MMHG | SYSTOLIC BLOOD PRESSURE: 113 MMHG

## 2017-06-04 VITALS — SYSTOLIC BLOOD PRESSURE: 110 MMHG | DIASTOLIC BLOOD PRESSURE: 84 MMHG

## 2017-06-04 VITALS — SYSTOLIC BLOOD PRESSURE: 136 MMHG | DIASTOLIC BLOOD PRESSURE: 81 MMHG

## 2017-06-04 VITALS — DIASTOLIC BLOOD PRESSURE: 83 MMHG | SYSTOLIC BLOOD PRESSURE: 115 MMHG

## 2017-06-04 VITALS — SYSTOLIC BLOOD PRESSURE: 121 MMHG | DIASTOLIC BLOOD PRESSURE: 76 MMHG

## 2017-06-04 LAB
ANION GAP SERPL CALC-SCNC: 7 MMOL/L (ref 6–14)
BASOPHILS # BLD AUTO: 0 X10^3/UL (ref 0–0.2)
BASOPHILS NFR BLD: 0 % (ref 0–3)
BUN SERPL-MCNC: 39 MG/DL (ref 8–26)
CALCIUM SERPL-MCNC: 8.8 MG/DL (ref 8.5–10.1)
CHLORIDE SERPL-SCNC: 103 MMOL/L (ref 98–107)
CO2 SERPL-SCNC: 30 MMOL/L (ref 21–32)
CREAT SERPL-MCNC: 1.5 MG/DL (ref 0.7–1.3)
EOSINOPHIL NFR BLD: 3 % (ref 0–3)
ERYTHROCYTE [DISTWIDTH] IN BLOOD BY AUTOMATED COUNT: 14 % (ref 11.5–14.5)
GFR SERPLBLD BASED ON 1.73 SQ M-ARVRAT: 46.5 ML/MIN
GLUCOSE SERPL-MCNC: 119 MG/DL (ref 70–99)
HCT VFR BLD CALC: 31.4 % (ref 39–53)
HGB BLD-MCNC: 10.8 G/DL (ref 13–17.5)
LYMPHOCYTES # BLD: 1.4 X10^3/UL (ref 1–4.8)
LYMPHOCYTES NFR BLD AUTO: 17 % (ref 24–48)
MCH RBC QN AUTO: 29 PG (ref 25–35)
MCHC RBC AUTO-ENTMCNC: 35 G/DL (ref 31–37)
MCV RBC AUTO: 84 FL (ref 79–100)
MONOCYTES NFR BLD: 8 % (ref 0–9)
NEUTROPHILS NFR BLD AUTO: 72 % (ref 31–73)
PLATELET # BLD AUTO: 171 X10^3/UL (ref 140–400)
POTASSIUM SERPL-SCNC: 4.2 MMOL/L (ref 3.5–5.1)
RBC # BLD AUTO: 3.74 X10^6/UL (ref 4.3–5.7)
SODIUM SERPL-SCNC: 140 MMOL/L (ref 136–145)
WBC # BLD AUTO: 7.9 X10^3/UL (ref 4–11)

## 2017-06-04 PROCEDURE — B2111ZZ FLUOROSCOPY OF MULTIPLE CORONARY ARTERIES USING LOW OSMOLAR CONTRAST: ICD-10-PCS | Performed by: INTERNAL MEDICINE

## 2017-06-04 PROCEDURE — 4A023N7 MEASUREMENT OF CARDIAC SAMPLING AND PRESSURE, LEFT HEART, PERCUTANEOUS APPROACH: ICD-10-PCS | Performed by: INTERNAL MEDICINE

## 2017-06-04 PROCEDURE — 4A033BC MEASUREMENT OF ARTERIAL PRESSURE, CORONARY, PERCUTANEOUS APPROACH: ICD-10-PCS | Performed by: INTERNAL MEDICINE

## 2017-06-04 RX ADMIN — ENOXAPARIN SODIUM SCH MG: 150 INJECTION SUBCUTANEOUS at 14:03

## 2017-06-04 RX ADMIN — SERTRALINE HYDROCHLORIDE SCH MG: 50 TABLET ORAL at 17:10

## 2017-06-04 RX ADMIN — FENTANYL CITRATE PRN MCG: 50 INJECTION INTRAMUSCULAR; INTRAVENOUS at 05:35

## 2017-06-04 RX ADMIN — INSULIN ASPART SCH UNITS: 100 INJECTION, SOLUTION INTRAVENOUS; SUBCUTANEOUS at 08:00

## 2017-06-04 RX ADMIN — METOPROLOL TARTRATE SCH MG: 5 INJECTION INTRAVENOUS at 05:35

## 2017-06-04 RX ADMIN — INSULIN ASPART SCH UNITS: 100 INJECTION, SOLUTION INTRAVENOUS; SUBCUTANEOUS at 17:58

## 2017-06-04 RX ADMIN — FENTANYL CITRATE PRN MCG: 50 INJECTION INTRAMUSCULAR; INTRAVENOUS at 14:07

## 2017-06-04 RX ADMIN — INSULIN ASPART SCH UNITS: 100 INJECTION, SOLUTION INTRAVENOUS; SUBCUTANEOUS at 12:00

## 2017-06-04 RX ADMIN — BACITRACIN SCH MLS/HR: 5000 INJECTION, POWDER, FOR SOLUTION INTRAMUSCULAR at 13:30

## 2017-06-04 RX ADMIN — METOPROLOL TARTRATE SCH MG: 5 INJECTION INTRAVENOUS at 14:08

## 2017-06-04 RX ADMIN — METOPROLOL TARTRATE SCH MG: 5 INJECTION INTRAVENOUS at 18:01

## 2017-06-04 RX ADMIN — METOPROLOL TARTRATE SCH MG: 5 INJECTION INTRAVENOUS at 00:03

## 2017-06-04 RX ADMIN — ENOXAPARIN SODIUM SCH MG: 150 INJECTION SUBCUTANEOUS at 23:00

## 2017-06-04 NOTE — PDOC
GENERAL


General:


see dictated H&P.


Problems:  





VITAL SIGNS


Vital Signs:





Vital Signs








  Date Time  Temp Pulse Resp B/P (MAP) Pulse Ox O2 Delivery O2 Flow Rate FiO2


 


6/4/17 12:00      Nasal Cannula 2.0 


 


6/4/17 12:00 98.6 120 20 119/80 (93) 100   





 98.6       











I & O


I & O











Intake and Output 


 


 6/4/17





 07:00


 


Intake Total 1003 ml


 


Output Total 1200 ml


 


Balance -197 ml


 


 


 


Intake Oral 400 ml


 


IV Total 603 ml


 


Output Urine Total 1200 ml











ALLERGIES


Allergies:





Allergies








Coded Allergies Type Severity Reaction Last Updated Verified


 


  Penicillins Allergy Severe RASH AND HIVES 6/24/16 Yes











MEDS


Medications:





Current Medications








 Medications


  (Trade)  Dose


 Ordered  Sig/Shelby  Start Time


 Stop Time Status Last Admin


Dose Admin


 


 Acetaminophen


  (Tylenol)  650 mg  PRN Q4HRS  PRN  6/3/17 15:30


 6/4/17 15:29   


 


 


 Amiodarone HCl


 150 mg/Dextrose  103 ml @ 


 618 mls/hr  1X  ONCE  6/3/17 12:15


 6/3/17 12:24 DC 6/3/17 12:24


618 MLS/HR


 


 Amiodarone HCl


 900 mg/Dextrose  518 ml @ 0


 mls/hr  CONT  PRN  6/3/17 12:15


 6/3/17 12:35 DC 6/3/17 12:34


33 MLS/HR


 


 Aspirin


  (Children'S


 Aspirin)  324 mg  1X  ONCE  6/3/17 12:15


 6/3/17 12:16 DC 6/3/17 12:19


324 MG


 


 Dextrose


  (Dextrose


 50%-Water Syringe)  12.5 gm  PRN Q15MIN  PRN  6/3/17 15:30


     


 


 


 Digoxin


  (Lanoxin)  500 mcg  1X  ONCE  6/3/17 13:00


 6/3/17 13:01 DC 6/3/17 12:50


500 MCG


 


 Enoxaparin Sodium


  (Lovenox 150mg


 Syringe)  130 mg  1X  ONCE  6/3/17 19:45


 6/3/17 19:48 DC 6/3/17 19:51


130 MG


 


 Fentanyl Citrate


  (Fentanyl 2ml


 Vial)  50 mcg  PRN Q2HR  PRN  6/3/17 15:30


 6/4/17 15:29  6/4/17 05:35


50 MCG


 


 Insulin Aspart


  (NovoLOG)  0-5 UNITS  TIDWMEALS  6/3/17 17:00


     


 


 


 Metoprolol


 Tartrate


  (Lopressor)  2.5 mg  Q6HRS  6/3/17 20:30


    6/4/17 05:35


2.5 MG


 


 Nitroglycerin


  (Nitrostat)  0.4 mg  PRN Q5MIN  PRN  6/3/17 15:30


 6/3/17 15:34 DC  


 


 


 Ondansetron HCl


  (Zofran)  4 mg  PRN Q8HRS  PRN  6/3/17 15:30


 6/4/17 15:29   


 


 


 Sodium Chloride  500 ml @ 


 500 mls/hr  1X  ONCE  6/3/17 12:45


 6/3/17 13:44 DC  


 











LAB


Lab:





Laboratory Tests








Test


  6/3/17


17:47 6/3/17


18:08 6/4/17


00:55 6/4/17


04:25


 


Glucose (Fingerstick)


  106 mg/dL


(70-99) 


  


  


 


 


Troponin I Quantitative


  


  3.126 ng/mL


(0.000-0.055) 4.990 ng/mL


(0.000-0.055) 6.234 ng/mL


(0.000-0.055)


 


White Blood Count


  


  


  


  7.9 x10^3/uL


(4.0-11.0)


 


Red Blood Count


  


  


  


  3.74 x10^6/uL


(4.30-5.70)


 


Hemoglobin


  


  


  


  10.8 g/dL


(13.0-17.5)


 


Hematocrit


  


  


  


  31.4 %


(39.0-53.0)


 


Mean Corpuscular Volume    84 fL () 


 


Mean Corpuscular Hemoglobin    29 pg (25-35) 


 


Mean Corpuscular Hemoglobin


Concent 


  


  


  35 g/dL


(31-37)


 


Red Cell Distribution Width


  


  


  


  14.0 %


(11.5-14.5)


 


Platelet Count


  


  


  


  171 x10^3/uL


(140-400)


 


Neutrophils (%) (Auto)    72 % (31-73) 


 


Lymphocytes (%) (Auto)    17 % (24-48) 


 


Monocytes (%) (Auto)    8 % (0-9) 


 


Eosinophils (%) (Auto)    3 % (0-3) 


 


Basophils (%) (Auto)    0 % (0-3) 


 


Neutrophils # (Auto)


  


  


  


  5.7 x10^3uL


(1.8-7.7)


 


Lymphocytes # (Auto)


  


  


  


  1.4 x10^3/uL


(1.0-4.8)


 


Monocytes # (Auto)


  


  


  


  0.6 x10^3/uL


(0.0-1.1)


 


Eosinophils # (Auto)


  


  


  


  0.2 x10^3/uL


(0.0-0.7)


 


Basophils # (Auto)


  


  


  


  0.0 x10^3/uL


(0.0-0.2)


 


Sodium Level


  


  


  


  140 mmol/L


(136-145)


 


Potassium Level


  


  


  


  4.2 mmol/L


(3.5-5.1)


 


Chloride Level


  


  


  


  103 mmol/L


()


 


Carbon Dioxide Level


  


  


  


  30 mmol/L


(21-32)


 


Anion Gap    7 (6-14) 


 


Blood Urea Nitrogen


  


  


  


  39 mg/dL


(8-26)


 


Creatinine


  


  


  


  1.5 mg/dL


(0.7-1.3)


 


Estimated GFR


(Cockcroft-Gault) 


  


  


  46.5 


 


 


Glucose Level


  


  


  


  119 mg/dL


(70-99)


 


Calcium Level


  


  


  


  8.8 mg/dL


(8.5-10.1)


 


Test


  6/4/17


08:04 6/4/17


12:09 


  


 


 


Glucose (Fingerstick)


  107 mg/dL


(70-99) 137 mg/dL


(70-99) 


  


 

















DAREN LAMBERT MD Jun 4, 2017 12:42

## 2017-06-04 NOTE — CARD
--------------- APPROVED REPORT --------------





EXAM: Two-dimensional and M-mode echocardiogram with Doppler and color Doppler.



Other Information 

Quality : GoodHR: 111bpm

Rhythm : Tachycardia



INDICATION

Atrial flutter



RISK FACTORS

Obesity   



2D DIMENSIONS 

RVDd2.7 (2.9-3.5cm)Left Atrium(2D)3.7 (1.6-4.0cm)

IVSd1.2 (0.7-1.1cm)Aortic Root(2D)2.6 (2.0-3.7cm)

LVDd4.6 (3.9-5.9cm)LVOT Diameter2.5 (1.8-2.4cm)

PWd1.2 (0.7-1.1cm)LVDs3.9 (2.5-4.0cm)

FS (%) 16.2 %SV33.2 ml

LVEF(%)34.1 (>50%)



Aortic Valve

AoV Peak Renato.236.4cm/sAoV VTI36.2cm

AO Peak GR.22.4mmHgLVOT Peak Renato.93.7cm/s

AO Mean GR.12mmHgAVA (VMAX)1.96cm2



Mitral Valve

MV E Ooqicxpv48.1cm/sMV DECEL [id number]

MV A Jpevdzmk91.6cm/sE/A  Ratio0.4

MV A [id number]



Tricuspid Valve

TR P. Pkiktqiv069mw/sTR Peak Gr.18mmHg



 LEFT VENTRICLE 

The left ventricle is normal size. There is mild concentric left ventricular hypertrophy. Left ventri
lit systolic function is mildly impaired. The Ejection Fraction is 45-50%. Abnormal septal wall motio
n is noted. There is mild global hypokinesis of the left ventricle. No left ventricle thrombus noted 
on this study.



 RIGHT VENTRICLE 

The right ventricle is normal size. There is normal right ventricular wall thickness. The right ventr
icular systolic function is normal.



 ATRIA 

The left atrium is moderately dilated. The right atrium size is normal. The interatrial septum is int
act with no evidence for an atrial septal defect or patent foramen ovale as noted on 2-D or Doppler i
maging.



 AORTIC VALVE 

The aortic valve is not well visualized. Doppler and Color Flow revealed no significant aortic regurg
itation. Aortic valve stenosis was difficult to assess at exam time. Visually,there is some aortic va
lvular narrowing possibly in the mild range.



 MITRAL VALVE 

The mitral valve is not well visualized but appears to open well. Mitral annular calcification is mil
d. There is no evidence of mitral valve prolapse. There is no mitral valve stenosis. Doppler and [place]
r Flow revealed trace mitral regurgitation.



 TRICUSPID VALVE 

Doppler and Color Flow revealed trace tricuspid regurgitation. The pulmonary artery systolic pressure
 is estimated at 21 mmHg. There is no pulmonary hypertension.



 PULMONIC VALVE 

The pulmonicl valve is not well visualized but appears to open well. Doppler and Color Flow revealed 
no pulmonic valvular regurgitation. There is no pulmonic valvular stenosis.



 GREAT VESSELS 

The aortic root is normal in size. The ascending aorta is normal in size. The pulmonary artery is nor
mal. The IVC was obscured, unable to assess.



 PERICARDIAL EFFUSION 

There is no evidence of significant pericardial effusion.



Critical Notification

Critical Value: No



<Conclusion>

The left ventricle is normal size.

There is mild concentric left ventricular hypertrophy.

Left ventricle systolic function is mildly impaired.

The Ejection Fraction is 45-50%

There is hypokinesis of the septum and apex

There is no significant diastolic dysfunction

There is no evidence of significant pericardial effusion.

There is no mitral stenosis and a trace mitral regurgitation

The lrdft atrium is mildly enlarged

The aortic valve was not well visualized and adequate doppler interrogation could not be carried out

The right ventricle is of a normal size.

Doppler and Color Flow revealed trace tricuspid regurgitation.

The pulmonary artery systolic pressure is estimated at 21 mmHg. 

There is no pulmonary hypertension.

## 2017-06-04 NOTE — RAD
PQRS Compliance Statement:

 

One or more of the following individualized dose reduction techniques were

utilized for this examination:  

1. Automated exposure control  

2. Adjustment of the mA and/or kV according to patient size  

3. Use of iterative reconstruction technique

 

 

CT HEAD WITHOUT CONTRAST

 

History: headache, weakness, frequent falls

 

Comparison: CT head without contrast, May 15, 2017.

 

Technique: Axial images are obtained of the head from the skull base 

through the vertex without IV contrast.

 

Findings: 

No mass-effect, midline shift, extra-axial fluid collection, hemorrhage, 

or obvious acute infarction is identified.  Basilar cisterns are patent.  

 

The ventricles and sulci are prominent, consistent with age-related 

cerebral atrophy. 

Bone windows demonstrate no acute calvarial abnormality. Left parietal 

scalp hematoma is mostly resolved.

The visualized paranasal sinuses are clear. Mastoid air cells are well 

aerated. 

 

IMPRESSION:

No acute intracranial abnormality.

 

Electronically signed by: Igor Levine MD (6/4/2017 8:54 PM)

## 2017-06-04 NOTE — CONS
DATE OF CONSULTATION:  



HISTORY OF PRESENT ILLNESS:  This is a 67-year-old white male who was at home. 

He had a sudden onset of palpitations and shortness of breath.  He also had

chest pain.  He had a headache.  These symptoms got worse and he requested his

wife to call the ambulance.  In the ambulance, he states the pain got worse.  He

also had a few syncopal episodes.  He was brought into the Emergency Room and I

saw him over there.



All these symptoms started just yesterday.  He has fallen twice.  He has been in

the hospital.  The wife believes he did hit his head at those times.  The last

time he was here he was seen by Dr. Leiva.  Both the falls occurred after he

had been driving and Dr. Leiva advised him not to drive.  During those

hospitalizations, it was known that he was in atrial fibrillation and Dr. Leiva was aware of it.  He, however, had no chest pain.  He believes he has

had a cardiac catheterization in the past, but he does not remember when.  He

did have a GI bleed in June and July 2016 and thus when he went into atrial

fibrillation he is not on any anticoagulation.  His ventricular response was

controlled.



He gives no history of hypertension.  I believe he does have diabetes mellitus. 

There is no history of myocardial infarction, though he believes he has had a

cardiac catheterization in the past.  There is no history of stroke.



PRESENT MEDICATIONS:

1.  Atorvastatin 40 mg a day.

2.  Furosemide 80 mg a day.

3.  Glipizide 5 mg a day.

4.  Metformin 1000 mg twice a day.

5.  Protonix 20 mg a day.

6.  Zoloft 50 mg at night.

7.  Sotalol 40 mg twice a day.



He has had at least 3 admissions to this hospital for altered mental status and

was thought to have mild dementia.  The first one was in 2014 and then in 2016

and again now in 2017.  He had undergone an echocardiogram and then a ROMI last

year and the ROMI revealed severe aortic regurgitation.  There is no

documentation of severe congestive heart failure.



PHYSICAL EXAMINATION:

GENERAL:  When I saw him in the ER he was alert, awake, though a little bit

drowsy.  His wife and daughter had to contribute to in history that he was

giving.

VITAL SIGNS:  In the ER his heart rate was 120 beats per minute.  The blood

pressure was 90/70.

LUNGS:  Clear.

HEART:  The heart sounds are normal.  There is a grade 1/6 systolic murmur.

ABDOMEN:  Soft.

EXTREMITIES:  There is no edema of the legs.



LABORATORY DATA:  A chest x-ray was unremarkable.



I reviewed the EKGs.  He has right bundle branch block.  This probably shows

atrial flutter with a 2:1 conduction.  There was one area in which flutter waves

could be seen when the R-R interval was slightly longer, but most of the time it

could not be seen.



The ambulance has an EKG which shows a rate of 250 beats per minute and wide QRS

complexes.  When comparing the access and the morphology of the complexes during

the rapid rate at 250 and during the slower rate at 125 they are very similar. 

It is quite possible that this rapid rate represents a 1:1 conduction rather

than ventricular tachycardia.  The patient was given amiodarone and started on

an amiodarone drip in the ambulance.



LABORATORY INVESTIGATIONS:  Initial troponin was negative.  However, later the

troponin went up to 3, then 4 and then 6.  The hemoglobin is 10.2.  The BUN is

39, the creatinine is 1.5.  The blood sugars are acceptable.



IMPRESSION:

1.  Non-ST-elevation myocardial infarction.

2.  Atrial flutter with 2:1 conduction, brief episode of a 1:1 conduction

causing a wide QRS tachycardia.  Doubt that this is his ventricular tachycardia.

3.  Chest pain.

4.  History of aortic regurgitation, no evidence of congestive heart failure.

5.  Diabetes mellitus.

6.  Obesity.

7.  Probable early dementia.

8.  Mild chronic kidney disease.



Discussed in length with the patient and his wife.  He has had no further

episodes of chest pain.  His heart rate is controlled with beta blocker and he

has had no further ventricular arrhythmia since the amiodarone was discontinued.



Because of the elevated enzymes to 6, it would be best to proceed with coronary

arteriograms.  The patient is agreeable.  We will arrange for it tomorrow.  In

the meantime, I will continue to give him the IV Lopressor, which is keeping his

heart rate under control.



Thank you for asking us to see him.  Dr. Leiva returns on 06/06/2017 at

night.

 



______________________________

JASBIR TREJO MD



DR:  OLIVER/rosina  JOB#:  [id number] / [id number]

DD:  06/04/2017 12:45  DT:  06/04/2017 23:48

## 2017-06-04 NOTE — ACF
Admission Forms Criteria


                                                CHEST PAIN





Clinical Indications for Admission to Inpatient Care





                                                                         (Place 

'X' for any and all applicable criteria): 





Admission is indicated for chest pain and ANY ONE of the following(1)(2)(3)(4)(5

): 





[ ]I.    Angina with acute coronary syndrome (Also use Myocardial Infarction or 

Angina guideline)


[ ]II.   Hemodynamic instability


[ ]III.  Angina needing acute intervention as indicated by ALL of the following(

11)(12):


        [ ]a)  Unstable angina is present as indicated by angina that is ANY ONE

 of the following:


                [ ]i)     New onset   


                [ ]ii)    Nocturnal   


                [ ]iii)   Prolonged at rest   


                [ ]iv)   Progressive


        [ ]b)  Angina warrants acute intervention as indicated by ANY ONE of 

the following:


                [ ]i)     Recurrent angina (e.g, not responding as previously 

to treatment)


                [ ]ii)     Angina at rest or with low-level activities despite 

initial medical therapy


                [ ]iii)    New or presumably new ST-segment depression on ECG


                [ ]iv)    Signs or symptoms of heart failure (eg, dyspnea, 

pulmonary edema)


                [ ]v)     New or worsening mitral regurgitation


                [ ]vi)    Hemodynamic instability


                [ ]vii)   Dangerous arrhythmia (eg, sustained ventricular 

tachycardia)          


                [ ]viii)   History of percutaneous coronary intervention within 

6 months          


                [ ]ix)    History of coronary artery bypass graft surgery


                [ ]x)    HUMERA risk score of 2 or greater[A]


                [ ]xi)    History of Diabetes(14)


                [ ]xii)   High-risk cardiac ischemia findings on noninvasive 

testing (e.g, echocardiogram,


                          treadmill testing, nuclear scan)


                [ ]xiii)  Chronic renal insufficiency (ie, estimated GFR less 

than 60 mL/min/1.732m)


                [ ]xiv)  Left ventricular ejection fraction less than 40%


              


[X]IV.   Evidence of MI (eg, cardiac biomarkers positive, ST-segment elevation 

on ECG) also use Myocardial Infarction Criteria Form.


[ ]V.    Pulmonary edema 


[ ]VI.   Respiratory distress


[ ]VII.  Chest pain indicative of serious diagnosis other than coronary artery 

disease (eg, aortic dissection)





[ ]VIII. Contraindications and/or Inappropriate clinical situations for 

Observational Care in patients


          with Chest Pain, when ANY ONE of the following is required:


          [ ]a)   Patient with risk factor for pulmonary embolism, acute 

coronary syndrome and myocardial infarction (18)


          [ ]b)   Patient with Pulmonary embolism require an average LOS of 4.3 

days, therefore emergency 


                   department observation management is inappropriate 18,23


          [ ]c)   Painful condition/s in the elderly, have the highest rate of 

recidivism after emergency department observation management (10.8%)  20,21,22


          [ ]d)   Elevated cardiac biomarker requires intensive and exhaustive 

care (19)


[ ]IX.  General contraindications and/or Inappropriate clinical situations for 

Observational Care in patients


          with Chest Pain, when ANY ONE of the following is required:


           [ ]a)   Prediction of prolongation of LOS based on ANY ONE of the 

following may be considered as 


                    a contraindication for observational care 2, 3, 4, 5, 6, 7, 

8, 9, 10, 11


                    [ ]i)    Age > 65 yrs.


                    [ ]ii)   Patient arriving by ambulance


                    [ ]iii)  Patient with high acuity


                    [ ]iv)  Patient requiring vital sign monitoring


                    [ ]v)   Patient on IV medication


           [ ]b)   Systolic blood pressures  180mmHg  3,12


           [ ]c)   Patient with altered mental status including delirium and 

other alteration of consciousness, (3)


           [ ]d)   Patient whose discharge disposition will be to a skilled 

nursing home or rehabilitation home should 


                    not be managed in Emergency Department Observation Unit. 

CMS rule requires 3 days hospital stay before such placement. 3,13


           [ ]e)   Patient with failure to thrive due to broad array of 

etiologies  3,16,17


           [ ]f)    Inability to ambulate  3,14








Extended stay beyond goal length of stay may be needed for (1)(28):


[ ]a)   Specific condition diagnosed after evaluation (eg, pulmonary embolism, 

aortic dissection) 


[ ]b)   Unstable angina


[ ]c)   Continued suspicion of acute coronary syndrome with inability to 

complete needed cardiac evaluation


         (eg, patient clinically unable to undergo stress testing)


[ ]d)   Myocardial infarction








(Contents from ANGINA and CHEST PAIN clinical indications for admission to 

inpatient care have been integrated in this form) 








The original [employer] content created by [employer] has been revised. 


The portions of the content which have been revised are identified through the 

use of italic text or in bold, and [facility]


has neither reviewed nor approved the modified material. All other unmodified 

content is copyright  [employer].





Please see references footnoted in the original [facility] edition 

2016


Admission Criteria Met?:  Yes











MEGAN EBRGER Jun 4, 2017 06:56

## 2017-06-04 NOTE — HP
ADMIT DATE:  



CHIEF COMPLAINT AND HISTORY OF PRESENT ILLNESS:  This is a 67-year-old white

male patient of Dr. Hany Ragland, had the onset of feeling his heart racing

like crazy and then a severe headache on the day of admission.  He summoned the

ambulance and passed out at least 3 times on the way to the hospital with

tachycardia that was felt to be ventricular tachycardia, although he

spontaneously awoken on each of the three and did not require any cardioversion.

 He was started on amiodarone on the way.  Does have a longstanding history of

AFib/flutter and sees cardiology namely Dr. Leiva regularly for this.  He

was admitted through the Emergency Room to the ICU for the same.



PAST MEDICAL HISTORY:  Remarkable for the AFib/flutter, he has the history of

congestive heart failure, diabetes, atherosclerotic heart disease, aortic

insufficiency, ____ some years ago with no stenting needed.



MEDICATIONS:  Medications were brought with the patient, listed on the computer

and have been addressed.



ALLERGIES:  ALLERGIC TO PENICILLIN.



SOCIAL HISTORY:  He is nonsmoker, nondrinker, does not use drugs.  He is retired

from the [employer].



FAMILY HISTORY:  Noncontributory.



REVIEW OF SYSTEMS:  Is that as mentioned above.



PHYSICAL EXAMINATION:

GENERAL:  He is a well-developed, well-nourished white male, in no acute

distress at time of my examination.

VITAL SIGNS:  Stable.  He is afebrile.  He is slightly tachycardic with a heart

rate of 110.

HEAD, EYES, EARS, NOSE AND THROAT:  Unremarkable.

NECK:  Supple, without any thyromegaly.

CHEST:  Clear to auscultation and percussion.

HEART:  Regular with grade  2-3/6 systolic murmur and is tachycardic.

ABDOMEN:  Soft, nontender, without hepatosplenomegaly or masses.

EXTREMITIES:  Without cyanosis, clubbing or edema.

NEUROLOGIC:  He is intact.



LABORATORY DATA:  Initial lab work and investigations shows normal chest x-ray. 

Creatinine of 1.6.  BNP of 2200, TSH was increased is 6.2.



IMPRESSION:

1.  Cardiac arrhythmia with syncope as discussed above likely Vtach but

certainly could be his atrial flutter with strange conduction.

2.  Other problems listed above.



PLAN:  The patient has in the ICU.  Cardiology has been consulted.  He has been

remained on amiodarone drip at the time of admission.  The patient will be

monitored, managed and treated appropriately.

 



______________________________

DAREN LAMBERT MD



DR:  Brandon  JOB#:  [id number] / [id number]

DD:  06/04/2017 12:41  DT:  06/04/2017 14:21

## 2017-06-05 VITALS — SYSTOLIC BLOOD PRESSURE: 131 MMHG | DIASTOLIC BLOOD PRESSURE: 86 MMHG

## 2017-06-05 VITALS — DIASTOLIC BLOOD PRESSURE: 104 MMHG | SYSTOLIC BLOOD PRESSURE: 147 MMHG

## 2017-06-05 VITALS — SYSTOLIC BLOOD PRESSURE: 144 MMHG | DIASTOLIC BLOOD PRESSURE: 100 MMHG

## 2017-06-05 VITALS — DIASTOLIC BLOOD PRESSURE: 101 MMHG | SYSTOLIC BLOOD PRESSURE: 152 MMHG

## 2017-06-05 VITALS — SYSTOLIC BLOOD PRESSURE: 153 MMHG | DIASTOLIC BLOOD PRESSURE: 99 MMHG

## 2017-06-05 VITALS — DIASTOLIC BLOOD PRESSURE: 114 MMHG | SYSTOLIC BLOOD PRESSURE: 184 MMHG

## 2017-06-05 VITALS — DIASTOLIC BLOOD PRESSURE: 121 MMHG | SYSTOLIC BLOOD PRESSURE: 168 MMHG

## 2017-06-05 VITALS — DIASTOLIC BLOOD PRESSURE: 111 MMHG | SYSTOLIC BLOOD PRESSURE: 167 MMHG

## 2017-06-05 VITALS — DIASTOLIC BLOOD PRESSURE: 113 MMHG | SYSTOLIC BLOOD PRESSURE: 155 MMHG

## 2017-06-05 VITALS — DIASTOLIC BLOOD PRESSURE: 110 MMHG | SYSTOLIC BLOOD PRESSURE: 168 MMHG

## 2017-06-05 VITALS — DIASTOLIC BLOOD PRESSURE: 101 MMHG | SYSTOLIC BLOOD PRESSURE: 162 MMHG

## 2017-06-05 VITALS — SYSTOLIC BLOOD PRESSURE: 138 MMHG | DIASTOLIC BLOOD PRESSURE: 101 MMHG

## 2017-06-05 VITALS — DIASTOLIC BLOOD PRESSURE: 90 MMHG | SYSTOLIC BLOOD PRESSURE: 155 MMHG

## 2017-06-05 VITALS — DIASTOLIC BLOOD PRESSURE: 83 MMHG | SYSTOLIC BLOOD PRESSURE: 119 MMHG

## 2017-06-05 VITALS — DIASTOLIC BLOOD PRESSURE: 111 MMHG | SYSTOLIC BLOOD PRESSURE: 169 MMHG

## 2017-06-05 VITALS — DIASTOLIC BLOOD PRESSURE: 92 MMHG | SYSTOLIC BLOOD PRESSURE: 153 MMHG

## 2017-06-05 VITALS — SYSTOLIC BLOOD PRESSURE: 155 MMHG | DIASTOLIC BLOOD PRESSURE: 106 MMHG

## 2017-06-05 VITALS — SYSTOLIC BLOOD PRESSURE: 168 MMHG | DIASTOLIC BLOOD PRESSURE: 117 MMHG

## 2017-06-05 VITALS — SYSTOLIC BLOOD PRESSURE: 152 MMHG | DIASTOLIC BLOOD PRESSURE: 110 MMHG

## 2017-06-05 VITALS — SYSTOLIC BLOOD PRESSURE: 150 MMHG | DIASTOLIC BLOOD PRESSURE: 108 MMHG

## 2017-06-05 VITALS — SYSTOLIC BLOOD PRESSURE: 166 MMHG | DIASTOLIC BLOOD PRESSURE: 117 MMHG

## 2017-06-05 VITALS — DIASTOLIC BLOOD PRESSURE: 93 MMHG | SYSTOLIC BLOOD PRESSURE: 147 MMHG

## 2017-06-05 VITALS — SYSTOLIC BLOOD PRESSURE: 176 MMHG | DIASTOLIC BLOOD PRESSURE: 126 MMHG

## 2017-06-05 VITALS — DIASTOLIC BLOOD PRESSURE: 97 MMHG | SYSTOLIC BLOOD PRESSURE: 144 MMHG

## 2017-06-05 VITALS — DIASTOLIC BLOOD PRESSURE: 97 MMHG | SYSTOLIC BLOOD PRESSURE: 139 MMHG

## 2017-06-05 VITALS — DIASTOLIC BLOOD PRESSURE: 86 MMHG | SYSTOLIC BLOOD PRESSURE: 144 MMHG

## 2017-06-05 VITALS — DIASTOLIC BLOOD PRESSURE: 108 MMHG | SYSTOLIC BLOOD PRESSURE: 148 MMHG

## 2017-06-05 VITALS — SYSTOLIC BLOOD PRESSURE: 146 MMHG | DIASTOLIC BLOOD PRESSURE: 95 MMHG

## 2017-06-05 VITALS — DIASTOLIC BLOOD PRESSURE: 106 MMHG | SYSTOLIC BLOOD PRESSURE: 151 MMHG

## 2017-06-05 VITALS — DIASTOLIC BLOOD PRESSURE: 97 MMHG | SYSTOLIC BLOOD PRESSURE: 145 MMHG

## 2017-06-05 LAB
ANION GAP SERPL CALC-SCNC: 5 MMOL/L (ref 6–14)
BUN SERPL-MCNC: 27 MG/DL (ref 8–26)
CALCIUM SERPL-MCNC: 8.7 MG/DL (ref 8.5–10.1)
CHLORIDE SERPL-SCNC: 106 MMOL/L (ref 98–107)
CHOLEST SERPL-MCNC: 109 MG/DL (ref 0–200)
CHOLEST/HDLC SERPL: 3.6 {RATIO}
CO2 SERPL-SCNC: 30 MMOL/L (ref 21–32)
CREAT SERPL-MCNC: 1.2 MG/DL (ref 0.7–1.3)
GFR SERPLBLD BASED ON 1.73 SQ M-ARVRAT: 60.2 ML/MIN
GLUCOSE SERPL-MCNC: 127 MG/DL (ref 70–99)
HDLC SERPL-MCNC: 30 MG/DL (ref 40–60)
NONHDLC SERPL-MCNC: 79 MG/DL (ref 0–129)
POTASSIUM SERPL-SCNC: 4.3 MMOL/L (ref 3.5–5.1)
SODIUM SERPL-SCNC: 141 MMOL/L (ref 136–145)
TRIGL SERPL-MCNC: 48 MG/DL (ref 0–150)

## 2017-06-05 RX ADMIN — INSULIN ASPART SCH UNITS: 100 INJECTION, SOLUTION INTRAVENOUS; SUBCUTANEOUS at 17:00

## 2017-06-05 RX ADMIN — INSULIN ASPART SCH UNITS: 100 INJECTION, SOLUTION INTRAVENOUS; SUBCUTANEOUS at 07:10

## 2017-06-05 RX ADMIN — METOPROLOL TARTRATE SCH MG: 50 TABLET, FILM COATED ORAL at 21:36

## 2017-06-05 RX ADMIN — METOPROLOL TARTRATE SCH MG: 5 INJECTION INTRAVENOUS at 13:26

## 2017-06-05 RX ADMIN — METOPROLOL TARTRATE SCH MG: 5 INJECTION INTRAVENOUS at 19:00

## 2017-06-05 RX ADMIN — INSULIN ASPART SCH UNITS: 100 INJECTION, SOLUTION INTRAVENOUS; SUBCUTANEOUS at 12:00

## 2017-06-05 RX ADMIN — METOPROLOL TARTRATE SCH MG: 5 INJECTION INTRAVENOUS at 00:03

## 2017-06-05 RX ADMIN — SERTRALINE HYDROCHLORIDE SCH MG: 50 TABLET ORAL at 13:27

## 2017-06-05 RX ADMIN — METOPROLOL TARTRATE SCH MG: 5 INJECTION INTRAVENOUS at 06:06

## 2017-06-05 RX ADMIN — ENOXAPARIN SODIUM SCH MG: 150 INJECTION SUBCUTANEOUS at 13:26

## 2017-06-05 RX ADMIN — ENOXAPARIN SODIUM SCH MG: 150 INJECTION SUBCUTANEOUS at 09:00

## 2017-06-05 RX ADMIN — ACETAMINOPHEN PRN MG: 325 TABLET, FILM COATED ORAL at 21:59

## 2017-06-05 RX ADMIN — BACITRACIN SCH MLS/HR: 5000 INJECTION, POWDER, FOR SOLUTION INTRAMUSCULAR at 02:50

## 2017-06-05 NOTE — PDOC
Provider Note


Provider Note


Cussed with Dr. Hankins.


He has a significant obstruction in the LAD. He has already shown some wall 

motion abnormalities on echocardiogram in the LAD distribution.


There is a possible ostial obstruction of D1.


Surgery is being considered and  patient voices his  desire to proceed with 

surgery.


Dr. Leiva returns tomorrow and he will decide.


He continues to be in atrial flutter.


Blood pressure is now elevated.


May transfer out of ICU.











JASBIR TREJO MD Jun 5, 2017 21:02

## 2017-06-05 NOTE — CARD
--------------- APPROVED REPORT --------------





Procedure(s) performed: Left Heart Catheterization, Coronary angiography

FFR / IFR - LAD



HISTORY

previous MI: previous CHF, diabetes mellitus with oraloral and insulin treatment, peripheral vascular
 disease, tobacco history() : The patient is a former smoker, hypertension, dyslipidemia, family hist
ory of premature CAD.



INDICATION

The indication(s) include : non-STEMI .



CASE TECHNIQUE

During this case, Fluoroscopy and low osmolar contrast were used for imaging.



PROCEDURE NARRATIVE

The patient was brought electively to the cardiac catheterization lab.  A timeout was performed confi
rming the patient's name, date of birth, procedure, and site of procedure.  All necessary personnel w
ere wearing the appropriate protective equipment and radiation monitor devices.  After explaining the
 risks and benefits of the procedure and alternatives, informed consent was obtained. (See nursing no
todd for medications administered).  Due to patient's height at 6'4'', a radial approach was limited d
ue to catheter length.  The right groin was sterilely prepped and draped in the usual fashion.  The r
ight groin was infiltrated with 10 mL of 2% lidocaine for subcutaneous anesthesia.  A 6 F sheath was 
inserted into the right CF artery without difficulty.  Right and left coronary angiography was perfor
med using a JR4 and JL5 catheter, respectively.   Left ventricular end diastolic pressure was obtaine
d with a pigtail catheter and pullback was performed.  



HEMODYNAMICS: 

LVEDP 12 mm Hg

No gradient on LV to aortic pullback. 



LEFT VENTRICULOGRAM: Deferred due to renal insufficiency and previous echo. 



CORONARY ANGIOGRAPHY: 

LM is a large caliber vessel with normal angiographic appearance. 

LAD is a large caliber vessel with a mid 70% stenosis. 

D1 is a moderate caliber vessel with possible ostial 50% stenosis.  

LCx is a large caliber co-dominant vessel with normal angiographic appearance. 

OM1 is a moderate caliber vessel with normal angiographic appearance. 

LPDA is a moderate caliber vessel with normal angiographic appearance. 

RCA is a moderate caliber co-dominant vessel with normal angiographic appearance. 

*Due to patient's body habitus, iliac tortuosity and difficulty engaging, the ostium diagonal vessel 
could not be vessel visualized. 



INTERVENTIONAL TECHNIQUE: FFR OF THE LAD

The patient previously was reported to have moderate to severe aortic insufficiency and current surfa
ce echo could not delineate the aortic valve well. Based upon the hemodynamic tracings, he did not ha
ve severe AS or AI. The patient also has a history of GIB for which he has not been on anticoagulatio
n or antiplatelet therapy. Therefore, it was felt that perhaps a LIMA to LAD with aortic valve replac
ement may be the best option to avoid long term anti-platelet therapy. Therefore, a functional study 
was performed. Enoxaparin was administered prior to the intervention. The 6Fr short sheath was exchan
ged for a 55 cm Boston Sheath. Next, a 6F JL5 guide catheter was used to engage the LM. A 0.014'' Verr
del pressure wire was used to engage the LM. After appropriate normalization, NTG administration and 
confirmation of pressures, an iFR was measured at 0.92 (Threshold of abnormality). Therefore, a FFR s
tudy was performed where 140mcg/kg/min of adenosine was administered and an FFR was measured at 0.80 
(positive study). Finally, a pigtail catheter was placed in the left ventricle and pressures were obt
ained. The right groin sheath was removed and hemostasis achieved with an Angioseal device. The patie
nt tolerated the procedure well and there were no immediate complications.



Conclusion

1. Normal left sided filling pressures. 

2. 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 

3. Positive FFR of the mid LAD at 0.80.



Recommendations

1. Consider repeat ROMI to evaluate the aortic valve, if patient has persistent moderate to severe aor
tic insufficiency, he might be best served with AVR and 1V CABG (LIMA to LAD) given his history of GI
B which would allow him to not have to be on DAPT for up to 1 year. 

2. Ultimately, if surgery is not indicated, would recommend challenge with DAPT for 4 weeks and if to
lerated would then consider drug eluting stent placement in the LAD. Given the patient's Afib and nee
d for anticoagulation, he might be high risk for GIB, therefore, ultimately, he might only be able to
 tolerate a bare metal stent to limit duration of dual anti-platelet therapy. Ideally, given the loca
tion of lesion, long term patency would be best served with either a LIMA or drug eluting stent. 

3. Final recommendations per Dr. Roland Arroyo and Dr. Leiva.

4. If repeat intervention planned, recommend either left radial or plan for long sheath placement to 
traverse the iliac tortuosity.

## 2017-06-06 VITALS — SYSTOLIC BLOOD PRESSURE: 126 MMHG | DIASTOLIC BLOOD PRESSURE: 94 MMHG

## 2017-06-06 VITALS — SYSTOLIC BLOOD PRESSURE: 138 MMHG | DIASTOLIC BLOOD PRESSURE: 88 MMHG

## 2017-06-06 VITALS — DIASTOLIC BLOOD PRESSURE: 98 MMHG | SYSTOLIC BLOOD PRESSURE: 145 MMHG

## 2017-06-06 VITALS — SYSTOLIC BLOOD PRESSURE: 150 MMHG | DIASTOLIC BLOOD PRESSURE: 90 MMHG

## 2017-06-06 VITALS — SYSTOLIC BLOOD PRESSURE: 114 MMHG | DIASTOLIC BLOOD PRESSURE: 88 MMHG

## 2017-06-06 VITALS — SYSTOLIC BLOOD PRESSURE: 117 MMHG | DIASTOLIC BLOOD PRESSURE: 81 MMHG

## 2017-06-06 VITALS — SYSTOLIC BLOOD PRESSURE: 119 MMHG | DIASTOLIC BLOOD PRESSURE: 84 MMHG

## 2017-06-06 VITALS — DIASTOLIC BLOOD PRESSURE: 65 MMHG | SYSTOLIC BLOOD PRESSURE: 112 MMHG

## 2017-06-06 VITALS — DIASTOLIC BLOOD PRESSURE: 74 MMHG | SYSTOLIC BLOOD PRESSURE: 125 MMHG

## 2017-06-06 VITALS — SYSTOLIC BLOOD PRESSURE: 137 MMHG | DIASTOLIC BLOOD PRESSURE: 84 MMHG

## 2017-06-06 VITALS — DIASTOLIC BLOOD PRESSURE: 94 MMHG | SYSTOLIC BLOOD PRESSURE: 136 MMHG

## 2017-06-06 VITALS — SYSTOLIC BLOOD PRESSURE: 143 MMHG | DIASTOLIC BLOOD PRESSURE: 93 MMHG

## 2017-06-06 LAB
ANION GAP SERPL CALC-SCNC: 5 MMOL/L (ref 6–14)
BUN SERPL-MCNC: 19 MG/DL (ref 8–26)
CALCIUM SERPL-MCNC: 9 MG/DL (ref 8.5–10.1)
CHLORIDE SERPL-SCNC: 103 MMOL/L (ref 98–107)
CO2 SERPL-SCNC: 31 MMOL/L (ref 21–32)
CREAT SERPL-MCNC: 0.9 MG/DL (ref 0.7–1.3)
GFR SERPLBLD BASED ON 1.73 SQ M-ARVRAT: 83.9 ML/MIN
GLUCOSE SERPL-MCNC: 120 MG/DL (ref 70–99)
POTASSIUM SERPL-SCNC: 4.2 MMOL/L (ref 3.5–5.1)
SODIUM SERPL-SCNC: 139 MMOL/L (ref 136–145)

## 2017-06-06 RX ADMIN — INSULIN ASPART SCH UNITS: 100 INJECTION, SOLUTION INTRAVENOUS; SUBCUTANEOUS at 12:20

## 2017-06-06 RX ADMIN — ACETAMINOPHEN PRN MG: 325 TABLET, FILM COATED ORAL at 20:02

## 2017-06-06 RX ADMIN — METOPROLOL TARTRATE SCH MG: 50 TABLET, FILM COATED ORAL at 06:08

## 2017-06-06 RX ADMIN — DIGOXIN SCH MCG: 250 TABLET ORAL at 17:28

## 2017-06-06 RX ADMIN — SERTRALINE HYDROCHLORIDE SCH MG: 50 TABLET ORAL at 08:29

## 2017-06-06 RX ADMIN — INSULIN ASPART SCH UNITS: 100 INJECTION, SOLUTION INTRAVENOUS; SUBCUTANEOUS at 17:00

## 2017-06-06 RX ADMIN — METOPROLOL TARTRATE SCH MG: 50 TABLET, FILM COATED ORAL at 14:44

## 2017-06-06 RX ADMIN — METOPROLOL TARTRATE SCH MG: 50 TABLET, FILM COATED ORAL at 21:39

## 2017-06-06 RX ADMIN — INSULIN ASPART SCH UNITS: 100 INJECTION, SOLUTION INTRAVENOUS; SUBCUTANEOUS at 08:00

## 2017-06-06 RX ADMIN — ACETAMINOPHEN PRN MG: 325 TABLET, FILM COATED ORAL at 04:14

## 2017-06-06 RX ADMIN — PANTOPRAZOLE SODIUM SCH MG: 40 TABLET, DELAYED RELEASE ORAL at 12:17

## 2017-06-06 NOTE — PDOC
Provider Note


Provider Note


Covering for Dr. Leiva.


Remains in atrial flutter.


Heart rate response is 100 -110 bpm at rest.


Discussed  with his sister.


When the patient had  bleeding ulcer he was taking large doses of NSAIDs.


He probably will be able to tolerate the DAPT .


Dr. Leiva returns tomorrow.











JASBIR TREJO MD Jun 6, 2017 23:18

## 2017-06-06 NOTE — PDOC
Provider Note


Provider Note


no more chest pain, still af /rate 110-120- will add digoxin to po metop- esr 

re headaches as ct ok











DAPHNEY NAIR MD Jun 6, 2017 08:51

## 2017-06-07 VITALS — DIASTOLIC BLOOD PRESSURE: 64 MMHG | SYSTOLIC BLOOD PRESSURE: 104 MMHG

## 2017-06-07 VITALS — SYSTOLIC BLOOD PRESSURE: 131 MMHG | DIASTOLIC BLOOD PRESSURE: 79 MMHG

## 2017-06-07 VITALS — SYSTOLIC BLOOD PRESSURE: 107 MMHG | DIASTOLIC BLOOD PRESSURE: 84 MMHG

## 2017-06-07 VITALS — SYSTOLIC BLOOD PRESSURE: 156 MMHG | DIASTOLIC BLOOD PRESSURE: 104 MMHG

## 2017-06-07 VITALS — SYSTOLIC BLOOD PRESSURE: 135 MMHG | DIASTOLIC BLOOD PRESSURE: 98 MMHG

## 2017-06-07 VITALS — DIASTOLIC BLOOD PRESSURE: 83 MMHG | SYSTOLIC BLOOD PRESSURE: 140 MMHG

## 2017-06-07 RX ADMIN — SERTRALINE HYDROCHLORIDE SCH MG: 50 TABLET ORAL at 08:29

## 2017-06-07 RX ADMIN — INSULIN ASPART SCH UNITS: 100 INJECTION, SOLUTION INTRAVENOUS; SUBCUTANEOUS at 08:34

## 2017-06-07 RX ADMIN — PANTOPRAZOLE SODIUM SCH MG: 40 TABLET, DELAYED RELEASE ORAL at 08:29

## 2017-06-07 RX ADMIN — METOPROLOL TARTRATE SCH MG: 50 TABLET, FILM COATED ORAL at 08:29

## 2017-06-07 RX ADMIN — DIGOXIN SCH MCG: 250 TABLET ORAL at 08:29

## 2017-06-07 RX ADMIN — METOPROLOL TARTRATE SCH MG: 50 TABLET, FILM COATED ORAL at 20:19

## 2017-06-07 RX ADMIN — ACETAMINOPHEN PRN MG: 325 TABLET, FILM COATED ORAL at 12:29

## 2017-06-07 RX ADMIN — INSULIN ASPART SCH UNITS: 100 INJECTION, SOLUTION INTRAVENOUS; SUBCUTANEOUS at 18:34

## 2017-06-07 RX ADMIN — INSULIN ASPART SCH UNITS: 100 INJECTION, SOLUTION INTRAVENOUS; SUBCUTANEOUS at 12:38

## 2017-06-07 RX ADMIN — ACETAMINOPHEN PRN MG: 325 TABLET, FILM COATED ORAL at 05:22

## 2017-06-07 NOTE — PDOC2
CONSULT


Date of Consult


Date of Consult


DATE: 6/7/17 


TIME: 15:25





Past Medical History


Cardiovascular:  CAD, HTN, Hyperlipidemia, Valve insufficiency


Pulmonary:  Bronchitis


Endocrine:  Diabetes





Family History


Family History:  Hypertension





Current Problem List


Problem List


Problems


Medical Problems:


(1) Arrhythmia


Status: Acute  











Current Medications


Current Medications





Current Medications


Aspirin (Children'S Aspirin) 324 mg 1X  ONCE PO  Last administered on 6/3/17at 

12:19;  Start 6/3/17 at 12:15;  Stop 6/3/17 at 12:16;  Status DC


Fentanyl Citrate (Fentanyl 2ml Vial) 25 mcg PRN Q15MIN  PRN IV PAIN GREATER 

THAN 3/10 Last administered on 6/3/17at 12:27;  Start 6/3/17 at 12:15;  Stop 6/3

/17 at 15:34;  Status DC


Nitroglycerin (Nitrostat) 0.4 mg PRN Q5MIN  PRN SL CHEST PAIN;  Start 6/3/17 at 

12:15


Amiodarone HCl 150 mg/Dextrose 103 ml @  618 mls/hr 1X  ONCE IV  Last 

administered on 6/3/17at 12:24;  Start 6/3/17 at 12:15;  Stop 6/3/17 at 12:24;  

Status DC


Amiodarone HCl 900 mg/Dextrose 518 ml @ 0 mls/hr CONT  PRN IV SEE I/O RECORD 

Last administered on 6/3/17at 12:34;  Start 6/3/17 at 12:15;  Stop 6/3/17 at 12:

35;  Status DC


Sodium Chloride 1,000 ml @  1,000 mls/hr 1X  ONCE IV  Last administered on 6/3/

17at 12:35;  Start 6/3/17 at 12:45;  Stop 6/3/17 at 13:44;  Status DC


Digoxin (Lanoxin) 500 mcg 1X  ONCE PO  Last administered on 6/3/17at 12:50;  

Start 6/3/17 at 13:00;  Stop 6/3/17 at 13:01;  Status DC


Sodium Chloride 500 ml @  500 mls/hr 1X  ONCE IV ;  Start 6/3/17 at 12:45;  

Stop 6/3/17 at 13:44;  Status DC


Ondansetron HCl (Zofran) 4 mg PRN Q8HRS  PRN IV NAUSEA/VOMITING;  Start 6/3/17 

at 15:30;  Stop 6/4/17 at 15:29;  Status DC


Fentanyl Citrate (Fentanyl 2ml Vial) 50 mcg PRN Q2HR  PRN IV PAIN Last 

administered on 6/4/17at 14:07;  Start 6/3/17 at 15:30;  Stop 6/4/17 at 15:29;  

Status DC


Acetaminophen (Tylenol) 650 mg PRN Q4HRS  PRN PO FEVER;  Start 6/3/17 at 15:30;

  Stop 6/4/17 at 15:29;  Status DC


Nitroglycerin (Nitrostat) 0.4 mg PRN Q5MIN  PRN SL CHEST PAIN;  Start 6/3/17 at 

15:30;  Stop 6/3/17 at 15:34;  Status DC


Insulin Aspart (NovoLOG) 0-5 UNITS TIDWMEALS SQ  Last administered on 6/7/17at 

12:38;  Start 6/3/17 at 17:00


Dextrose (Dextrose 50%-Water Syringe) 12.5 gm PRN Q15MIN  PRN IV SEE COMMENTS;  

Start 6/3/17 at 15:30


Metoprolol Tartrate (Lopressor) 2.5 mg Q6HRS IVP ;  Start 6/4/17 at 19:30;  

Stop 6/4/17 at 19:30;  Status DC


Enoxaparin Sodium (Lovenox 150mg Syringe) 130 mg 1X  ONCE SQ  Last administered 

on 6/3/17at 19:51;  Start 6/3/17 at 19:45;  Stop 6/3/17 at 19:48;  Status DC


Metoprolol Tartrate (Lopressor) 2.5 mg Q6HRS IVP  Last administered on 6/4/17at 

05:35;  Start 6/3/17 at 20:30;  Stop 6/4/17 at 12:53;  Status DC


Levothyroxine Sodium (Synthroid) 50 mcg DAILY07 PO ;  Start 6/4/17 at 13:30;  

Stop 6/4/17 at 14:26;  Status DC


Sodium Chloride 1,000 ml @  75 mls/hr [id number] IV  Last administered on 6/5/17at 

02:50;  Start 6/4/17 at 13:30;  Stop 6/5/17 at 19:05;  Status DC


Enoxaparin Sodium (Lovenox Per Pharmacy Treatment Dosing) 1 each PRN DAILY  PRN 

MC SEE COMMENTS;  Start 6/4/17 at 13:00;  Stop 6/5/17 at 21:22;  Status DC


Metoprolol Tartrate (Lopressor) 5 mg Q6HRS IVP  Last administered on 6/5/17at 19

:00;  Start 6/4/17 at 13:30;  Stop 6/5/17 at 21:22;  Status DC


Enoxaparin Sodium (Lovenox 150mg Syringe) 130 mg BID SQ  Last administered on 6/ 4/17at 14:03;  Start 6/4/17 at 13:30;  Stop 6/5/17 at 19:54;  Status DC


Sertraline HCl (Zoloft) 50 mg DAILY PO  Last administered on 6/7/17at 08:29;  

Start 6/4/17 at 14:30


Non-Formulary Medication 2 each BID PO ;  Start 6/4/17 at 21:00;  Status UNV


Info (Anti-Coagulation Monitoring By Pharmacy) 1 each PRN DAILY  PRN MC SEE 

COMMENTS Last administered on 6/5/17at 08:20;  Start 6/5/17 at 08:15;  Stop 6/6/ 17 at 16:21;  Status DC


Lidocaine HCl 20 ml STK-MED ONCE .ROUTE ;  Start 6/5/17 at 13:40;  Stop 6/5/17 

at 13:41;  Status DC


Heparin Sodium/ Sodium Chloride 1,000 ml @  As Directed STK-MED ONCE .ROUTE ;  

Start 6/5/17 at 13:40;  Stop 6/5/17 at 13:41;  Status DC


Iodixanol (Visipaque 320) 100 ml STK-MED ONCE .ROUTE ;  Start 6/5/17 at 13:40;  

Stop 6/5/17 at 13:41;  Status DC


Midazolam HCl (Versed) 2 mg STK-MED ONCE .ROUTE ;  Start 6/5/17 at 14:02;  Stop 

6/5/17 at 14:03;  Status DC


Fentanyl Citrate (Fentanyl 2ml Vial) 100 mcg STK-MED ONCE .ROUTE ;  Start 6/5/ 17 at 14:03;  Stop 6/5/17 at 14:04;  Status DC


Heparin Sodium/ Sodium Chloride 500 ml @  As Directed STK-MED ONCE .ROUTE ;  

Start 6/5/17 at 14:32;  Stop 6/5/17 at 14:33;  Status DC


Nitroglycerin/ Dextrose 250 ml @  As Directed STK-MED ONCE IV ;  Start 6/5/17 

at 14:39;  Stop 6/5/17 at 14:40;  Status DC


Adenosine (Adenoscan) 90 mg STK-MED ONCE IV ;  Start 6/5/17 at 14:49;  Stop 6/5/ 17 at 14:50;  Status DC


Hydralazine HCl (Apresoline) 20 mg STK-MED ONCE .ROUTE ;  Start 6/5/17 at 15:06

;  Stop 6/5/17 at 15:07;  Status DC


Heparin Sodium (Porcine) (Heparin Sodium) 10,000 unit STK-MED ONCE .ROUTE ;  

Start 6/5/17 at 15:17;  Stop 6/5/17 at 15:18;  Status DC


Heparin Sodium/ Sodium Chloride 1,000 unit 1X  ONCE IART  Last administered on 6 /5/17at 15:28;  Start 6/5/17 at 15:30;  Stop 6/5/17 at 15:31;  Status DC


Midazolam HCl (Versed) 2 mg 1X  ONCE IV ;  Start 6/5/17 at 15:30;  Stop 6/5/17 

at 15:31;  Status DC


Fentanyl Citrate (Fentanyl 2ml Vial) 100 mcg 1X  ONCE IV ;  Start 6/5/17 at 15:

30;  Stop 6/5/17 at 15:31;  Status DC


Iodixanol (Visipaque 320) 100 ml 1X  ONCE IART  Last administered on 6/5/17at 15

:28;  Start 6/5/17 at 15:30;  Stop 6/5/17 at 15:31;  Status DC


Heparin Sodium (Porcine) (Heparin Sodium) 4,000 unit 1X  ONCE IV  Last 

administered on 6/5/17at 15:32;  Start 6/5/17 at 15:30;  Stop 6/5/17 at 15:31;  

Status DC


Adenosine 90 mg/ Sodium Chloride 120 ml @  200 mls/hr 1X  ONCE IV  Last 

administered on 6/5/17at 15:29;  Start 6/5/17 at 15:30;  Stop 6/5/17 at 16:05;  

Status DC


Hydralazine HCl (Apresoline) 5 mg 1X  ONCE IVP  Last administered on 6/5/17at 15

:30;  Start 6/5/17 at 15:30;  Stop 6/5/17 at 15:31;  Status DC


Info (Do NOT chart on this entry -- for MONITORING) 1 each PRN DAILY  PRN MC 

SEE COMMENTS;  Start 6/5/17 at 15:30;  Stop 6/7/17 at 15:29


Amlodipine Besylate (Norvasc) 5 mg DAILY PO  Last administered on 6/6/17at 08:30

;  Start 6/6/17 at 09:00;  Stop 6/6/17 at 23:22;  Status DC


Amlodipine Besylate (Norvasc) 5 mg 1X  ONCE PO  Last administered on 6/5/17at 21

:36;  Start 6/5/17 at 21:15;  Stop 6/5/17 at 21:16;  Status DC


Metoprolol Tartrate (Lopressor) 50 mg Q8HRS PO  Last administered on 6/6/17at 21

:39;  Start 6/5/17 at 22:00;  Stop 6/6/17 at 23:22;  Status DC


Acetaminophen (Tylenol) 650 mg PRN QID  PRN PO PAIN Last administered on 6/7/ 17at 12:29;  Start 6/5/17 at 22:00


Pantoprazole Sodium (Protonix) 40 mg DAILYAC PO  Last administered on 6/7/17at 

08:29;  Start 6/6/17 at 11:30


Digoxin (Lanoxin) 500 mcg 1X  ONCE PO  Last administered on 6/6/17at 09:28;  

Start 6/6/17 at 08:45;  Stop 6/6/17 at 08:55;  Status DC


Digoxin (Lanoxin) 250 mcg DAILY PO  Last administered on 6/7/17at 08:29;  Start 

6/6/17 at 16:00


Metoprolol Tartrate (Lopressor) 50 mg BID PO  Last administered on 6/7/17at 08:

29;  Start 6/7/17 at 09:00


Diltiazem HCl (Cardizem 24hr Cd) 180 mg DAILY PO  Last administered on 6/7/17at 

08:29;  Start 6/7/17 at 09:00


Prednisone (Prednisone) 40 mg DAILY PO  Last administered on 6/7/17at 09:53;  

Start 6/7/17 at 09:30


Metformin HCl (Glucophage) 1,000 mg BIDWMEALS PO ;  Start 6/7/17 at 17:00





Active Scripts


Active


Pantoprazole Sodium 40 Mg Tablet.dr 40 Mg PO BIDAC


Reported


Stephanie Back & Body Caplet (Aspirin/Caffeine) 1 Each Tablet 2 Each PO BID


Zoloft (Sertraline Hcl) 50 Mg Tablet 1 Tab PO DAILY


Glipizide 5 Mg Tablet 0.5 Tab PO BID


Potassium Chloride 20 Meq Tablet.er 20 Meq PO BID


     last dose this am


     next dose with supper


Sotalol (Sotalol Hcl) 80 Mg Tablet 40 Mg PO BID


     last dose this am


     next dose tonight


Furosemide 80 Mg Tablet 80 Mg PO DAILY


     last dose this am


     next dose tomorrow


Atorvastatin Calcium 40 Mg Tablet 40 Mg PO HS


     last dose was last dose


     next dose tonight


Metformin Hcl 1,000 Mg Tablet 1,000 Mg PO BID


     last dose this am


     next dose with supper





Allergies


Allergies:  


Coded Allergies:  


     Penicillins (Verified  Allergy, Severe, RASH AND HIVES, 6/24/16)





Vitals


VITALS





Vital Signs








  Date Time  Temp Pulse Resp B/P (MAP) Pulse Ox O2 Delivery O2 Flow Rate FiO2


 


6/7/17 12:32  88  131/79 (96)    


 


6/7/17 08:00      Room Air 2.0 


 


6/7/17 07:39 97.7  20  97   





 97.7       











Labs


Labs





Laboratory Tests








Test


  6/5/17


16:04 6/5/17


21:40 6/6/17


04:10 6/6/17


08:13


 


Glucose (Fingerstick)


  99 mg/dL


(70-99) 248 mg/dL


(70-99) 


  132 mg/dL


(70-99)


 


Sodium Level


  


  


  139 mmol/L


(136-145) 


 


 


Potassium Level


  


  


  4.2 mmol/L


(3.5-5.1) 


 


 


Chloride Level


  


  


  103 mmol/L


() 


 


 


Carbon Dioxide Level


  


  


  31 mmol/L


(21-32) 


 


 


Anion Gap   5 (6-14)  


 


Blood Urea Nitrogen


  


  


  19 mg/dL


(8-26) 


 


 


Creatinine


  


  


  0.9 mg/dL


(0.7-1.3) 


 


 


Estimated GFR


(Cockcroft-Gault) 


  


  83.9 


  


 


 


Glucose Level


  


  


  120 mg/dL


(70-99) 


 


 


Calcium Level


  


  


  9.0 mg/dL


(8.5-10.1) 


 


 


Test


  6/6/17


09:20 6/6/17


11:32 6/6/17


16:58 6/6/17


20:32


 


Erythrocyte Sedimentation Rate 29 (0-15)    


 


Glucose (Fingerstick)


  


  280 mg/dL


(70-99) 118 mg/dL


(70-99) 197 mg/dL


(70-99)


 


Test


  6/7/17


07:44 6/7/17


12:28 


  


 


 


Glucose (Fingerstick)


  166 mg/dL


(70-99) 171 mg/dL


(70-99) 


  


 








Laboratory Tests








Test


  6/6/17


16:58 6/6/17


20:32 6/7/17


07:44 6/7/17


12:28


 


Glucose (Fingerstick)


  118 mg/dL


(70-99) 197 mg/dL


(70-99) 166 mg/dL


(70-99) 171 mg/dL


(70-99)











Assessment/Plan


Assessment/Plan


FNTBD





for left TAB in the morning





Thanks for consult











ROBERT PADILLA MD Jun 7, 2017 15:26

## 2017-06-07 NOTE — PDOC
Provider Note


Provider Note


1 week of persistant, bilat pain in "ears" and central head, no jaw claudication

, esr only 29 but is a little up, and he is tender over both TAs- cannot be 

sure he does not have early temporal arteritis, discussed need for art bx to 

rule out , in advance of any CV intervention- he consents to discuss bx w/ 

surgeon, will start moderate dose pred in advance, will need more insulin re 

same-- notr ROMI 2014 showed NO aortic stenosis, but moderate aortic regurg











DAPHNEY NAIR MD Jun 7, 2017 08:57

## 2017-06-07 NOTE — PDOC
PROGRESS NOTES


Subjective


Subjective


No chest pains now.





Complaining of a severe headache.





Objective


Objective





Vital Signs








  Date Time  Temp Pulse Resp B/P (MAP) Pulse Ox O2 Delivery O2 Flow Rate FiO2


 


6/7/17 20:19  119  105/63    


 


6/7/17 19:10 97.5  18  96 Room Air  





 97.5       


 


6/7/17 08:00       2.0 














Intake and Output 


 


 6/7/17





 07:00


 


Intake Total 1180 ml


 


Output Total 1000 ml


 


Balance 180 ml


 


 


 


Intake Oral 1180 ml


 


Output Urine Total 1000 ml











Physical Exam


Physical Exam


No significant changes in cardiac exam





Assessment


Assessment


At this time the patient appears to be compensated cardiac-wise. He has a known 

history of valvular heart disease with aortic insufficiency as well as coronary 

artery disease.





He would be a very poor surgical candidate.





At this time I think that we need to evaluate this situation of a possible 

temporal arteritis therefore I agree with proceeding with the biopsy.





After the biopsy and then depending on the results I would then suggest to get 

a cardiovascular surgery consult and then discuss with the patient and the 

family the situation, options, risks as well as his 





poor overall conditioning.





Comment


Review of Relevant


I have reviewed the following items nirmal (where applicable) has been applied.


Labs





Laboratory Tests








Test


  6/5/17


21:40 6/6/17


04:10 6/6/17


08:13 6/6/17


09:20


 


Glucose (Fingerstick)


  248 mg/dL


(70-99) 


  132 mg/dL


(70-99) 


 


 


Sodium Level


  


  139 mmol/L


(136-145) 


  


 


 


Potassium Level


  


  4.2 mmol/L


(3.5-5.1) 


  


 


 


Chloride Level


  


  103 mmol/L


() 


  


 


 


Carbon Dioxide Level


  


  31 mmol/L


(21-32) 


  


 


 


Anion Gap  5 (6-14)   


 


Blood Urea Nitrogen


  


  19 mg/dL


(8-26) 


  


 


 


Creatinine


  


  0.9 mg/dL


(0.7-1.3) 


  


 


 


Estimated GFR


(Cockcroft-Gault) 


  83.9 


  


  


 


 


Glucose Level


  


  120 mg/dL


(70-99) 


  


 


 


Calcium Level


  


  9.0 mg/dL


(8.5-10.1) 


  


 


 


Erythrocyte Sedimentation Rate    29 (0-15) 


 


Test


  6/6/17


11:32 6/6/17


16:58 6/6/17


20:32 6/7/17


07:44


 


Glucose (Fingerstick)


  280 mg/dL


(70-99) 118 mg/dL


(70-99) 197 mg/dL


(70-99) 166 mg/dL


(70-99)


 


Test


  6/7/17


12:28 6/7/17


17:12 6/7/17


21:02 


 


 


Glucose (Fingerstick)


  171 mg/dL


(70-99) 230 mg/dL


(70-99) 299 mg/dL


(70-99) 


 








Laboratory Tests








Test


  6/7/17


07:44 6/7/17


12:28 6/7/17


17:12 6/7/17


21:02


 


Glucose (Fingerstick)


  166 mg/dL


(70-99) 171 mg/dL


(70-99) 230 mg/dL


(70-99) 299 mg/dL


(70-99)








Medications





Current Medications


Aspirin (Children'S Aspirin) 324 mg 1X  ONCE PO  Last administered on 6/3/17at 

12:19;  Start 6/3/17 at 12:15;  Stop 6/3/17 at 12:16;  Status DC


Fentanyl Citrate (Fentanyl 2ml Vial) 25 mcg PRN Q15MIN  PRN IV PAIN GREATER 

THAN 3/10 Last administered on 6/3/17at 12:27;  Start 6/3/17 at 12:15;  Stop 6/3

/17 at 15:34;  Status DC


Nitroglycerin (Nitrostat) 0.4 mg PRN Q5MIN  PRN SL CHEST PAIN;  Start 6/3/17 at 

12:15


Amiodarone HCl 150 mg/Dextrose 103 ml @  618 mls/hr 1X  ONCE IV  Last 

administered on 6/3/17at 12:24;  Start 6/3/17 at 12:15;  Stop 6/3/17 at 12:24;  

Status DC


Amiodarone HCl 900 mg/Dextrose 518 ml @ 0 mls/hr CONT  PRN IV SEE I/O RECORD 

Last administered on 6/3/17at 12:34;  Start 6/3/17 at 12:15;  Stop 6/3/17 at 12:

35;  Status DC


Sodium Chloride 1,000 ml @  1,000 mls/hr 1X  ONCE IV  Last administered on 6/3/

17at 12:35;  Start 6/3/17 at 12:45;  Stop 6/3/17 at 13:44;  Status DC


Digoxin (Lanoxin) 500 mcg 1X  ONCE PO  Last administered on 6/3/17at 12:50;  

Start 6/3/17 at 13:00;  Stop 6/3/17 at 13:01;  Status DC


Sodium Chloride 500 ml @  500 mls/hr 1X  ONCE IV ;  Start 6/3/17 at 12:45;  

Stop 6/3/17 at 13:44;  Status DC


Ondansetron HCl (Zofran) 4 mg PRN Q8HRS  PRN IV NAUSEA/VOMITING;  Start 6/3/17 

at 15:30;  Stop 6/4/17 at 15:29;  Status DC


Fentanyl Citrate (Fentanyl 2ml Vial) 50 mcg PRN Q2HR  PRN IV PAIN Last 

administered on 6/4/17at 14:07;  Start 6/3/17 at 15:30;  Stop 6/4/17 at 15:29;  

Status DC


Acetaminophen (Tylenol) 650 mg PRN Q4HRS  PRN PO FEVER;  Start 6/3/17 at 15:30;

  Stop 6/4/17 at 15:29;  Status DC


Nitroglycerin (Nitrostat) 0.4 mg PRN Q5MIN  PRN SL CHEST PAIN;  Start 6/3/17 at 

15:30;  Stop 6/3/17 at 15:34;  Status DC


Insulin Aspart (NovoLOG) 0-5 UNITS TIDWMEALS SQ  Last administered on 6/7/17at 

18:34;  Start 6/3/17 at 17:00


Dextrose (Dextrose 50%-Water Syringe) 12.5 gm PRN Q15MIN  PRN IV SEE COMMENTS;  

Start 6/3/17 at 15:30


Metoprolol Tartrate (Lopressor) 2.5 mg Q6HRS IVP ;  Start 6/4/17 at 19:30;  

Stop 6/4/17 at 19:30;  Status DC


Enoxaparin Sodium (Lovenox 150mg Syringe) 130 mg 1X  ONCE SQ  Last administered 

on 6/3/17at 19:51;  Start 6/3/17 at 19:45;  Stop 6/3/17 at 19:48;  Status DC


Metoprolol Tartrate (Lopressor) 2.5 mg Q6HRS IVP  Last administered on 6/4/17at 

05:35;  Start 6/3/17 at 20:30;  Stop 6/4/17 at 12:53;  Status DC


Levothyroxine Sodium (Synthroid) 50 mcg DAILY07 PO ;  Start 6/4/17 at 13:30;  

Stop 6/4/17 at 14:26;  Status DC


Sodium Chloride 1,000 ml @  75 mls/hr [id number] IV  Last administered on 6/5/17at 

02:50;  Start 6/4/17 at 13:30;  Stop 6/5/17 at 19:05;  Status DC


Enoxaparin Sodium (Lovenox Per Pharmacy Treatment Dosing) 1 each PRN DAILY  PRN 

MC SEE COMMENTS;  Start 6/4/17 at 13:00;  Status Cancel


Metoprolol Tartrate (Lopressor) 5 mg Q6HRS IVP  Last administered on 6/5/17at 19

:00;  Start 6/4/17 at 13:30;  Stop 6/5/17 at 21:22;  Status DC


Enoxaparin Sodium (Lovenox 150mg Syringe) 130 mg BID SQ  Last administered on 6/ 4/17at 14:03;  Start 6/4/17 at 13:30;  Stop 6/5/17 at 19:54;  Status DC


Sertraline HCl (Zoloft) 50 mg DAILY PO  Last administered on 6/7/17at 08:29;  

Start 6/4/17 at 14:30


Non-Formulary Medication 2 each BID PO ;  Start 6/4/17 at 21:00;  Status UNV


Info (Anti-Coagulation Monitoring By Pharmacy) 1 each PRN DAILY  PRN MC SEE 

COMMENTS Last administered on 6/5/17at 08:20;  Start 6/5/17 at 08:15;  Stop 6/6/ 17 at 16:21;  Status DC


Lidocaine HCl 20 ml STK-MED ONCE .ROUTE ;  Start 6/5/17 at 13:40;  Stop 6/5/17 

at 13:41;  Status DC


Heparin Sodium/ Sodium Chloride 1,000 ml @  As Directed STK-MED ONCE .ROUTE ;  

Start 6/5/17 at 13:40;  Stop 6/5/17 at 13:41;  Status DC


Iodixanol (Visipaque 320) 100 ml STK-MED ONCE .ROUTE ;  Start 6/5/17 at 13:40;  

Stop 6/5/17 at 13:41;  Status DC


Midazolam HCl (Versed) 2 mg STK-MED ONCE .ROUTE ;  Start 6/5/17 at 14:02;  Stop 

6/5/17 at 14:03;  Status DC


Fentanyl Citrate (Fentanyl 2ml Vial) 100 mcg STK-MED ONCE .ROUTE ;  Start 6/5/ 17 at 14:03;  Stop 6/5/17 at 14:04;  Status DC


Heparin Sodium/ Sodium Chloride 500 ml @  As Directed STK-MED ONCE .ROUTE ;  

Start 6/5/17 at 14:32;  Stop 6/5/17 at 14:33;  Status DC


Nitroglycerin/ Dextrose 250 ml @  As Directed STK-MED ONCE IV ;  Start 6/5/17 

at 14:39;  Stop 6/5/17 at 14:40;  Status DC


Adenosine (Adenoscan) 90 mg STK-MED ONCE IV ;  Start 6/5/17 at 14:49;  Stop 6/5/ 17 at 14:50;  Status DC


Hydralazine HCl (Apresoline) 20 mg STK-MED ONCE .ROUTE ;  Start 6/5/17 at 15:06

;  Stop 6/5/17 at 15:07;  Status DC


Heparin Sodium (Porcine) (Heparin Sodium) 10,000 unit STK-MED ONCE .ROUTE ;  

Start 6/5/17 at 15:17;  Stop 6/5/17 at 15:18;  Status DC


Heparin Sodium/ Sodium Chloride 1,000 unit 1X  ONCE IART  Last administered on 6 /5/17at 15:28;  Start 6/5/17 at 15:30;  Stop 6/5/17 at 15:31;  Status DC


Midazolam HCl (Versed) 2 mg 1X  ONCE IV ;  Start 6/5/17 at 15:30;  Stop 6/5/17 

at 15:31;  Status DC


Fentanyl Citrate (Fentanyl 2ml Vial) 100 mcg 1X  ONCE IV ;  Start 6/5/17 at 15:

30;  Stop 6/5/17 at 15:31;  Status DC


Iodixanol (Visipaque 320) 100 ml 1X  ONCE IART  Last administered on 6/5/17at 15

:28;  Start 6/5/17 at 15:30;  Stop 6/5/17 at 15:31;  Status DC


Heparin Sodium (Porcine) (Heparin Sodium) 4,000 unit 1X  ONCE IV  Last 

administered on 6/5/17at 15:32;  Start 6/5/17 at 15:30;  Stop 6/5/17 at 15:31;  

Status DC


Adenosine 90 mg/ Sodium Chloride 120 ml @  200 mls/hr 1X  ONCE IV  Last 

administered on 6/5/17at 15:29;  Start 6/5/17 at 15:30;  Stop 6/5/17 at 16:05;  

Status DC


Hydralazine HCl (Apresoline) 5 mg 1X  ONCE IVP  Last administered on 6/5/17at 15

:30;  Start 6/5/17 at 15:30;  Stop 6/5/17 at 15:31;  Status DC


Info (Do NOT chart on this entry -- for MONITORING) 1 each PRN DAILY  PRN MC 

SEE COMMENTS;  Start 6/5/17 at 15:30;  Stop 6/7/17 at 15:29;  Status DC


Amlodipine Besylate (Norvasc) 5 mg DAILY PO  Last administered on 6/6/17at 08:30

;  Start 6/6/17 at 09:00;  Stop 6/6/17 at 23:22;  Status DC


Amlodipine Besylate (Norvasc) 5 mg 1X  ONCE PO  Last administered on 6/5/17at 21

:36;  Start 6/5/17 at 21:15;  Stop 6/5/17 at 21:16;  Status DC


Metoprolol Tartrate (Lopressor) 50 mg Q8HRS PO  Last administered on 6/6/17at 21

:39;  Start 6/5/17 at 22:00;  Stop 6/6/17 at 23:22;  Status DC


Acetaminophen (Tylenol) 650 mg PRN QID  PRN PO PAIN Last administered on 6/7/ 17at 12:29;  Start 6/5/17 at 22:00


Pantoprazole Sodium (Protonix) 40 mg DAILYAC PO  Last administered on 6/7/17at 

08:29;  Start 6/6/17 at 11:30


Digoxin (Lanoxin) 500 mcg 1X  ONCE PO  Last administered on 6/6/17at 09:28;  

Start 6/6/17 at 08:45;  Stop 6/6/17 at 08:55;  Status DC


Digoxin (Lanoxin) 250 mcg DAILY PO  Last administered on 6/7/17at 08:29;  Start 

6/6/17 at 16:00


Metoprolol Tartrate (Lopressor) 50 mg BID PO  Last administered on 6/7/17at 20:

19;  Start 6/7/17 at 09:00


Diltiazem HCl (Cardizem 24hr Cd) 180 mg DAILY PO  Last administered on 6/7/17at 

08:29;  Start 6/7/17 at 09:00


Prednisone (Prednisone) 40 mg DAILY PO  Last administered on 6/7/17at 09:53;  

Start 6/7/17 at 09:30


Metformin HCl (Glucophage) 1,000 mg BIDWMEALS PO  Last administered on 6/7/17at 

18:29;  Start 6/7/17 at 17:00


Levofloxacin/ Dextrose 100 ml @  100 mls/hr 1X PREOP  PRN IV prophylaxis;  

Start 6/8/17 at 06:00;  Stop 6/8/17 at 18:00


Fentanyl Citrate (Fentanyl 2ml Vial) 25 mcg PRN Q5MIN  PRN IV MILD PAIN;  Start 

6/8/17 at 07:00;  Stop 6/9/17 at 06:59


Fentanyl Citrate (Fentanyl 2ml Vial) 50 mcg PRN Q5MIN  PRN IV MODERATE PAIN;  

Start 6/8/17 at 07:00;  Stop 6/9/17 at 06:59


Morphine Sulfate 1 mg PRN Q10MIN  PRN IV SEVERE PAIN;  Start 6/8/17 at 07:00;  

Stop 6/9/17 at 06:59


Ringer's Solution 1,000 ml @  0 mls/hr Q0M IV ;  Start 6/8/17 at 07:00;  Stop 6/ 8/17 at 18:59


Lidocaine HCl 2 ml PRN 1X  PRN ID PRIOR TO IV START;  Start 6/8/17 at 07:00;  

Stop 6/9/17 at 06:59


Hydromorphone HCl (Dilaudid) 0.5 mg PRN Q10MIN  PRN IV SEV PAIN, Second choice;

  Start 6/8/17 at 07:00;  Stop 6/9/17 at 06:59


Prochlorperazine Edisylate (Compazine) 5 mg PACU PRN  PRN IV NAUSEA, MRX1;  

Start 6/8/17 at 07:00;  Stop 6/9/17 at 06:59





Active Scripts


Active


Pantoprazole Sodium 40 Mg Tablet.dr 40 Mg PO BIDAC


Reported


Stephanie Back & Body Caplet (Aspirin/Caffeine) 1 Each Tablet 2 Each PO BID


Zoloft (Sertraline Hcl) 50 Mg Tablet 1 Tab PO DAILY


Glipizide 5 Mg Tablet 0.5 Tab PO BID


Potassium Chloride 20 Meq Tablet.er 20 Meq PO BID


     last dose this am


     next dose with supper


Sotalol (Sotalol Hcl) 80 Mg Tablet 40 Mg PO BID


     last dose this am


     next dose tonight


Furosemide 80 Mg Tablet 80 Mg PO DAILY


     last dose this am


     next dose tomorrow


Atorvastatin Calcium 40 Mg Tablet 40 Mg PO HS


     last dose was last dose


     next dose tonight


Metformin Hcl 1,000 Mg Tablet 1,000 Mg PO BID


     last dose this am


     next dose with supper


Vitals/I & O





Vital Sign - Last 24 Hours








 6/6/17 6/6/17 6/7/17 6/7/17





 21:39 23:15 03:15 07:39


 


Temp  98.5 98.1 97.7





  98.5 98.1 97.7


 


Pulse 116 115 105 123


 


Resp  22 18 20


 


B/P (MAP) 128/74 150/90 (110) 135/98 (110) 107/84 (92)


 


Pulse Ox  98 97 97


 


O2 Delivery  Room Air Room Air Room Air


 


    





    





 6/7/17 6/7/17 6/7/17 6/7/17





 08:00 08:29 08:29 08:29


 


Pulse  123 123 123


 


B/P (MAP)  107/84 107/84 107/84


 


O2 Delivery Room Air   


 


O2 Flow Rate 2.0   





 6/7/17 6/7/17 6/7/17 6/7/17





 12:32 15:50 19:10 20:19


 


Temp  98.0 97.5 





  98.0 97.5 


 


Pulse 88 103 110 119


 


Resp  22 18 


 


B/P (MAP) 131/79 (96) 156/104 (121) 104/64 (77) 105/63


 


Pulse Ox  96 96 


 


O2 Delivery  Room Air Room Air 














Intake and Output   


 


 6/6/17 6/6/17 6/7/17





 15:00 23:00 07:00


 


Intake Total 700 ml 480 ml 


 


Output Total 400 ml  600 ml


 


Balance 300 ml 480 ml -600 ml

















BRITTNEE AMAYA MD Jun 7, 2017 21:17

## 2017-06-08 VITALS — SYSTOLIC BLOOD PRESSURE: 120 MMHG | DIASTOLIC BLOOD PRESSURE: 80 MMHG

## 2017-06-08 VITALS — SYSTOLIC BLOOD PRESSURE: 133 MMHG | DIASTOLIC BLOOD PRESSURE: 92 MMHG

## 2017-06-08 VITALS — SYSTOLIC BLOOD PRESSURE: 124 MMHG | DIASTOLIC BLOOD PRESSURE: 79 MMHG

## 2017-06-08 VITALS — SYSTOLIC BLOOD PRESSURE: 142 MMHG | DIASTOLIC BLOOD PRESSURE: 99 MMHG

## 2017-06-08 VITALS — SYSTOLIC BLOOD PRESSURE: 127 MMHG | DIASTOLIC BLOOD PRESSURE: 82 MMHG

## 2017-06-08 PROCEDURE — 03BT0ZX EXCISION OF LEFT TEMPORAL ARTERY, OPEN APPROACH, DIAGNOSTIC: ICD-10-PCS | Performed by: SURGERY

## 2017-06-08 RX ADMIN — OXYCODONE HYDROCHLORIDE AND ACETAMINOPHEN PRN TAB: 5; 325 TABLET ORAL at 16:41

## 2017-06-08 RX ADMIN — ACETAMINOPHEN PRN MG: 325 TABLET, FILM COATED ORAL at 05:14

## 2017-06-08 RX ADMIN — DIGOXIN SCH MCG: 250 TABLET ORAL at 10:41

## 2017-06-08 RX ADMIN — INSULIN ASPART SCH UNITS: 100 INJECTION, SOLUTION INTRAVENOUS; SUBCUTANEOUS at 13:15

## 2017-06-08 RX ADMIN — METOPROLOL TARTRATE SCH MG: 50 TABLET, FILM COATED ORAL at 21:12

## 2017-06-08 RX ADMIN — METOPROLOL TARTRATE SCH MG: 50 TABLET, FILM COATED ORAL at 10:41

## 2017-06-08 RX ADMIN — PANTOPRAZOLE SODIUM SCH MG: 40 TABLET, DELAYED RELEASE ORAL at 10:37

## 2017-06-08 RX ADMIN — INSULIN ASPART SCH UNITS: 100 INJECTION, SOLUTION INTRAVENOUS; SUBCUTANEOUS at 17:45

## 2017-06-08 RX ADMIN — INSULIN ASPART SCH UNITS: 100 INJECTION, SOLUTION INTRAVENOUS; SUBCUTANEOUS at 08:00

## 2017-06-08 RX ADMIN — SERTRALINE HYDROCHLORIDE SCH MG: 50 TABLET ORAL at 10:37

## 2017-06-08 RX ADMIN — INSULIN ASPART SCH UNITS: 100 INJECTION, SOLUTION INTRAVENOUS; SUBCUTANEOUS at 21:29

## 2017-06-08 RX ADMIN — ACETAMINOPHEN PRN MG: 325 TABLET, FILM COATED ORAL at 10:48

## 2017-06-08 NOTE — PDOC
Provider Note


Provider Note


out for surg- still had HA yesterday- pulse slower last 12 hrs- will continue 

pred until TA bx back to r/o TA- consult dr johnson re CAD but agree poor surg 

candidate- back on metformin re dm, will need more insulin for steroids











DAPHNEY NAIR MD Jun 8, 2017 09:08

## 2017-06-08 NOTE — OP
DATE OF SURGERY:  06/08/2017



PREOPERATIVE DIAGNOSIS:  Headaches, rule out temporal arteritis.



POSTOPERATIVE DIAGNOSIS:  Headaches, rule out temporal arteritis.



PROCEDURE:  Left temporal artery biopsy.



SURGEON:  Robert Padilla MD



ANESTHESIA:  General.



ESTIMATED BLOOD LOSS:  5 mL.



IV FLUID:  800.



INDICATIONS:  The patient is a 67-year-old gentleman with persistent headaches. 

He is brought for temporal artery biopsy.



DESCRIPTION OF PROCEDURE:  The patient was taken to the Operating Suite, given

general LMA, and the left temple preauricular area was prepped and draped in the

usual sterile fashion.  A course of the vessel was traced with a marking pen,

and the incision was infiltrated with 0.5% Marcaine plain.  Skin was incised,

and dissection was carried down to the vessel.  A segment was ligated proximally

and distally and harvested for biopsy.



Good hemostasis was present.  Incision was closed with interrupted inverted 3-0

Vicryl in the subcutaneous tissue, subcuticular 4-0 Monocryl with Steri-Strips

for the skin.  Sterile dressing was applied.  The patient was awakened from his

anesthetic and taken to the Recovery Room in satisfactory condition.

 



______________________________

ROBERT PADILLA MD



DR:  CHRIS/rosina  JOB#:  [id number] / [id number]

DD:  06/08/2017 09:27  DT:  06/08/2017 10:43

## 2017-06-08 NOTE — PDOC
PROGRESS NOTES


Subjective


Subjective


The patient tolerated the temporal artery biopsy very well. There were no 

issues with the anesthetic.





No cardiac complaints at this time





Objective


Objective





Vital Signs








  Date Time  Temp Pulse Resp B/P (MAP) Pulse Ox O2 Delivery O2 Flow Rate FiO2


 


6/8/17 11:00 98.0 93 18 127/82 (97) 97 Room Air  





 98.0       


 


6/8/17 09:31       10 














Intake and Output 


 


 6/8/17





 07:00


 


Intake Total 520 ml


 


Output Total 1200 ml


 


Balance -680 ml


 


 


 


Intake Oral 520 ml


 


Output Urine Total 1200 ml


 


# Voids 3











Physical Exam


Physical Exam


No significant changes in cardiac exam





Assessment


Assessment


Patient compensated cardiac-wise.





Awaiting results of the biopsy.





Comment


Review of Relevant


I have reviewed the following items nirmal (where applicable) has been applied.


Labs





Laboratory Tests








Test


  6/6/17


16:58 6/6/17


20:32 6/7/17


07:44 6/7/17


12:28


 


Glucose (Fingerstick)


  118 mg/dL


(70-99) 197 mg/dL


(70-99) 166 mg/dL


(70-99) 171 mg/dL


(70-99)


 


Test


  6/7/17


17:12 6/7/17


21:02 6/8/17


07:37 6/8/17


09:52


 


Glucose (Fingerstick)


  230 mg/dL


(70-99) 299 mg/dL


(70-99) 107 mg/dL


(70-99) 128 mg/dL


(70-99)


 


Test


  6/8/17


13:10 


  


  


 


 


Glucose (Fingerstick)


  240 mg/dL


(70-99) 


  


  


 








Laboratory Tests








Test


  6/7/17


17:12 6/7/17


21:02 6/8/17


07:37 6/8/17


09:52


 


Glucose (Fingerstick)


  230 mg/dL


(70-99) 299 mg/dL


(70-99) 107 mg/dL


(70-99) 128 mg/dL


(70-99)


 


Test


  6/8/17


13:10 


  


  


 


 


Glucose (Fingerstick)


  240 mg/dL


(70-99) 


  


  


 








Medications





Current Medications


Aspirin (Children'S Aspirin) 324 mg 1X  ONCE PO  Last administered on 6/3/17at 

12:19;  Start 6/3/17 at 12:15;  Stop 6/3/17 at 12:16;  Status DC


Fentanyl Citrate (Fentanyl 2ml Vial) 25 mcg PRN Q15MIN  PRN IV PAIN GREATER 

THAN 3/10 Last administered on 6/3/17at 12:27;  Start 6/3/17 at 12:15;  Stop 6/3

/17 at 15:34;  Status DC


Nitroglycerin (Nitrostat) 0.4 mg PRN Q5MIN  PRN SL CHEST PAIN;  Start 6/3/17 at 

12:15


Amiodarone HCl 150 mg/Dextrose 103 ml @  618 mls/hr 1X  ONCE IV  Last 

administered on 6/3/17at 12:24;  Start 6/3/17 at 12:15;  Stop 6/3/17 at 12:24;  

Status DC


Amiodarone HCl 900 mg/Dextrose 518 ml @ 0 mls/hr CONT  PRN IV SEE I/O RECORD 

Last administered on 6/3/17at 12:34;  Start 6/3/17 at 12:15;  Stop 6/3/17 at 12:

35;  Status DC


Sodium Chloride 1,000 ml @  1,000 mls/hr 1X  ONCE IV  Last administered on 6/3/

17at 12:35;  Start 6/3/17 at 12:45;  Stop 6/3/17 at 13:44;  Status DC


Digoxin (Lanoxin) 500 mcg 1X  ONCE PO  Last administered on 6/3/17at 12:50;  

Start 6/3/17 at 13:00;  Stop 6/3/17 at 13:01;  Status DC


Sodium Chloride 500 ml @  500 mls/hr 1X  ONCE IV ;  Start 6/3/17 at 12:45;  

Stop 6/3/17 at 13:44;  Status DC


Ondansetron HCl (Zofran) 4 mg PRN Q8HRS  PRN IV NAUSEA/VOMITING;  Start 6/3/17 

at 15:30;  Stop 6/4/17 at 15:29;  Status DC


Fentanyl Citrate (Fentanyl 2ml Vial) 50 mcg PRN Q2HR  PRN IV PAIN Last 

administered on 6/4/17at 14:07;  Start 6/3/17 at 15:30;  Stop 6/4/17 at 15:29;  

Status DC


Acetaminophen (Tylenol) 650 mg PRN Q4HRS  PRN PO FEVER;  Start 6/3/17 at 15:30;

  Stop 6/4/17 at 15:29;  Status DC


Nitroglycerin (Nitrostat) 0.4 mg PRN Q5MIN  PRN SL CHEST PAIN;  Start 6/3/17 at 

15:30;  Stop 6/3/17 at 15:34;  Status DC


Insulin Aspart (NovoLOG) 0-5 UNITS TIDWMEALS SQ  Last administered on 6/8/17at 

13:15;  Start 6/3/17 at 17:00


Dextrose (Dextrose 50%-Water Syringe) 12.5 gm PRN Q15MIN  PRN IV SEE COMMENTS;  

Start 6/3/17 at 15:30


Metoprolol Tartrate (Lopressor) 2.5 mg Q6HRS IVP ;  Start 6/4/17 at 19:30;  

Stop 6/4/17 at 19:30;  Status DC


Enoxaparin Sodium (Lovenox 150mg Syringe) 130 mg 1X  ONCE SQ  Last administered 

on 6/3/17at 19:51;  Start 6/3/17 at 19:45;  Stop 6/3/17 at 19:48;  Status DC


Metoprolol Tartrate (Lopressor) 2.5 mg Q6HRS IVP  Last administered on 6/4/17at 

05:35;  Start 6/3/17 at 20:30;  Stop 6/4/17 at 12:53;  Status DC


Levothyroxine Sodium (Synthroid) 50 mcg DAILY07 PO ;  Start 6/4/17 at 13:30;  

Stop 6/4/17 at 14:26;  Status DC


Sodium Chloride 1,000 ml @  75 mls/hr [id number] IV  Last administered on 6/5/17at 

02:50;  Start 6/4/17 at 13:30;  Stop 6/5/17 at 19:05;  Status DC


Enoxaparin Sodium (Lovenox Per Pharmacy Treatment Dosing) 1 each PRN DAILY  PRN 

MC SEE COMMENTS;  Start 6/4/17 at 13:00;  Status Cancel


Metoprolol Tartrate (Lopressor) 5 mg Q6HRS IVP  Last administered on 6/5/17at 19

:00;  Start 6/4/17 at 13:30;  Stop 6/5/17 at 21:22;  Status DC


Enoxaparin Sodium (Lovenox 150mg Syringe) 130 mg BID SQ  Last administered on 6/ 4/17at 14:03;  Start 6/4/17 at 13:30;  Stop 6/5/17 at 19:54;  Status DC


Sertraline HCl (Zoloft) 50 mg DAILY PO  Last administered on 6/8/17at 10:37;  

Start 6/4/17 at 14:30


Non-Formulary Medication 2 each BID PO ;  Start 6/4/17 at 21:00;  Status UNV


Info (Anti-Coagulation Monitoring By Pharmacy) 1 each PRN DAILY  PRN MC SEE 

COMMENTS Last administered on 6/5/17at 08:20;  Start 6/5/17 at 08:15;  Stop 6/6/ 17 at 16:21;  Status DC


Lidocaine HCl 20 ml STK-MED ONCE .ROUTE ;  Start 6/5/17 at 13:40;  Stop 6/5/17 

at 13:41;  Status DC


Heparin Sodium/ Sodium Chloride 1,000 ml @  As Directed STK-MED ONCE .ROUTE ;  

Start 6/5/17 at 13:40;  Stop 6/5/17 at 13:41;  Status DC


Iodixanol (Visipaque 320) 100 ml STK-MED ONCE .ROUTE ;  Start 6/5/17 at 13:40;  

Stop 6/5/17 at 13:41;  Status DC


Midazolam HCl (Versed) 2 mg STK-MED ONCE .ROUTE ;  Start 6/5/17 at 14:02;  Stop 

6/5/17 at 14:03;  Status DC


Fentanyl Citrate (Fentanyl 2ml Vial) 100 mcg STK-MED ONCE .ROUTE ;  Start 6/5/ 17 at 14:03;  Stop 6/5/17 at 14:04;  Status DC


Heparin Sodium/ Sodium Chloride 500 ml @  As Directed STK-MED ONCE .ROUTE ;  

Start 6/5/17 at 14:32;  Stop 6/5/17 at 14:33;  Status DC


Nitroglycerin/ Dextrose 250 ml @  As Directed STK-MED ONCE IV ;  Start 6/5/17 

at 14:39;  Stop 6/5/17 at 14:40;  Status DC


Adenosine (Adenoscan) 90 mg STK-MED ONCE IV ;  Start 6/5/17 at 14:49;  Stop 6/5/ 17 at 14:50;  Status DC


Hydralazine HCl (Apresoline) 20 mg STK-MED ONCE .ROUTE ;  Start 6/5/17 at 15:06

;  Stop 6/5/17 at 15:07;  Status DC


Heparin Sodium (Porcine) (Heparin Sodium) 10,000 unit STK-MED ONCE .ROUTE ;  

Start 6/5/17 at 15:17;  Stop 6/5/17 at 15:18;  Status DC


Heparin Sodium/ Sodium Chloride 1,000 unit 1X  ONCE IART  Last administered on 6 /5/17at 15:28;  Start 6/5/17 at 15:30;  Stop 6/5/17 at 15:31;  Status DC


Midazolam HCl (Versed) 2 mg 1X  ONCE IV ;  Start 6/5/17 at 15:30;  Stop 6/5/17 

at 15:31;  Status DC


Fentanyl Citrate (Fentanyl 2ml Vial) 100 mcg 1X  ONCE IV ;  Start 6/5/17 at 15:

30;  Stop 6/5/17 at 15:31;  Status DC


Iodixanol (Visipaque 320) 100 ml 1X  ONCE IART  Last administered on 6/5/17at 15

:28;  Start 6/5/17 at 15:30;  Stop 6/5/17 at 15:31;  Status DC


Heparin Sodium (Porcine) (Heparin Sodium) 4,000 unit 1X  ONCE IV  Last 

administered on 6/5/17at 15:32;  Start 6/5/17 at 15:30;  Stop 6/5/17 at 15:31;  

Status DC


Adenosine 90 mg/ Sodium Chloride 120 ml @  200 mls/hr 1X  ONCE IV  Last 

administered on 6/5/17at 15:29;  Start 6/5/17 at 15:30;  Stop 6/5/17 at 16:05;  

Status DC


Hydralazine HCl (Apresoline) 5 mg 1X  ONCE IVP  Last administered on 6/5/17at 15

:30;  Start 6/5/17 at 15:30;  Stop 6/5/17 at 15:31;  Status DC


Info (Do NOT chart on this entry -- for MONITORING) 1 each PRN DAILY  PRN MC 

SEE COMMENTS;  Start 6/5/17 at 15:30;  Stop 6/7/17 at 15:29;  Status DC


Amlodipine Besylate (Norvasc) 5 mg DAILY PO  Last administered on 6/6/17at 08:30

;  Start 6/6/17 at 09:00;  Stop 6/6/17 at 23:22;  Status DC


Amlodipine Besylate (Norvasc) 5 mg 1X  ONCE PO  Last administered on 6/5/17at 21

:36;  Start 6/5/17 at 21:15;  Stop 6/5/17 at 21:16;  Status DC


Metoprolol Tartrate (Lopressor) 50 mg Q8HRS PO  Last administered on 6/6/17at 21

:39;  Start 6/5/17 at 22:00;  Stop 6/6/17 at 23:22;  Status DC


Acetaminophen (Tylenol) 650 mg PRN QID  PRN PO PAIN Last administered on 6/8/ 17at 10:48;  Start 6/5/17 at 22:00


Pantoprazole Sodium (Protonix) 40 mg DAILYAC PO  Last administered on 6/8/17at 

10:37;  Start 6/6/17 at 11:30


Digoxin (Lanoxin) 500 mcg 1X  ONCE PO  Last administered on 6/6/17at 09:28;  

Start 6/6/17 at 08:45;  Stop 6/6/17 at 08:55;  Status DC


Digoxin (Lanoxin) 250 mcg DAILY PO  Last administered on 6/8/17at 10:41;  Start 

6/6/17 at 16:00


Metoprolol Tartrate (Lopressor) 50 mg BID PO  Last administered on 6/8/17at 10:

41;  Start 6/7/17 at 09:00


Diltiazem HCl (Cardizem 24hr Cd) 180 mg DAILY PO  Last administered on 6/8/17at 

10:40;  Start 6/7/17 at 09:00


Prednisone (Prednisone) 40 mg DAILY PO  Last administered on 6/8/17at 10:37;  

Start 6/7/17 at 09:30


Metformin HCl (Glucophage) 1,000 mg BIDWMEALS PO  Last administered on 6/8/17at 

10:37;  Start 6/7/17 at 17:00


Levofloxacin/ Dextrose 100 ml @  100 mls/hr 1X PREOP  PRN IV prophylaxis Last 

administered on 6/8/17at 08:44;  Start 6/8/17 at 06:00;  Stop 6/8/17 at 18:00


Fentanyl Citrate (Fentanyl 2ml Vial) 25 mcg PRN Q5MIN  PRN IV MILD PAIN;  Start 

6/8/17 at 07:00;  Stop 6/9/17 at 06:59


Fentanyl Citrate (Fentanyl 2ml Vial) 50 mcg PRN Q5MIN  PRN IV MODERATE PAIN;  

Start 6/8/17 at 07:00;  Stop 6/9/17 at 06:59


Morphine Sulfate 1 mg PRN Q10MIN  PRN IV SEVERE PAIN;  Start 6/8/17 at 07:00;  

Stop 6/9/17 at 06:59


Ringer's Solution 1,000 ml @  0 mls/hr Q0M IV ;  Start 6/8/17 at 07:00;  Stop 6/ 8/17 at 18:59


Lidocaine HCl 2 ml PRN 1X  PRN ID PRIOR TO IV START;  Start 6/8/17 at 07:00;  

Stop 6/9/17 at 06:59


Hydromorphone HCl (Dilaudid) 0.5 mg PRN Q10MIN  PRN IV SEV PAIN, Second choice;

  Start 6/8/17 at 07:00;  Stop 6/9/17 at 06:59


Prochlorperazine Edisylate (Compazine) 5 mg PACU PRN  PRN IV NAUSEA, MRX1;  

Start 6/8/17 at 07:00;  Stop 6/9/17 at 06:59


Bupivacaine HCl/ Epinephrine Bitart (Sensorcain-Mpf Epi 0.5%-[id number]) 30 ml STK

-MED ONCE .ROUTE ;  Start 6/8/17 at 07:38;  Stop 6/8/17 at 07:39;  Status DC


Bupivacaine HCl (Sensorcaine Mpf 0.5%) 30 ml STK-MED ONCE .ROUTE  Last 

administered on 6/8/17at 09:10;  Start 6/8/17 at 07:38;  Stop 6/8/17 at 07:39;  

Status DC


Fentanyl Citrate (Fentanyl 2ml Vial) 100 mcg STK-MED ONCE .ROUTE ;  Start 6/8/ 17 at 08:21;  Stop 6/8/17 at 08:22;  Status DC


Neomycin/ Polymyxin/ Bacitracin (Triple Antibiotic Ointment) 1 pkt STK-MED ONCE 

TP ;  Start 6/8/17 at 08:46;  Stop 6/8/17 at 08:47;  Status DC


Dexamethasone Sodium Phosphate (Decadron) 20 mg STK-MED ONCE .ROUTE ;  Start 6/8 /17 at 09:08;  Stop 6/8/17 at 09:09;  Status DC


Ondansetron HCl (Zofran) 4 mg STK-MED ONCE .ROUTE ;  Start 6/8/17 at 09:08;  

Stop 6/8/17 at 09:09;  Status DC


Propofol 20 ml @ As Directed STK-MED ONCE IV ;  Start 6/8/17 at 09:08;  Stop 6/8 /17 at 09:09;  Status DC


Lidocaine HCl (Lidocaine Pf 2% Vial) 5 ml STK-MED ONCE .ROUTE ;  Start 6/8/17 

at 09:08;  Stop 6/8/17 at 09:09;  Status DC


Sevoflurane (Ultane) 30 ml STK-MED ONCE IH ;  Start 6/8/17 at 09:08;  Stop 6/8/ 17 at 09:09;  Status DC


Phenylephrine HCl 1 mg STK-MED ONCE IV ;  Start 6/8/17 at 09:08;  Stop 6/8/17 

at 09:09;  Status DC





Active Scripts


Active


Pantoprazole Sodium 40 Mg Tablet.dr 40 Mg PO BIDAC


Reported


Stephanie Back & Body Caplet (Aspirin/Caffeine) 1 Each Tablet 2 Each PO BID


Zoloft (Sertraline Hcl) 50 Mg Tablet 1 Tab PO DAILY


Glipizide 5 Mg Tablet 0.5 Tab PO BID


Potassium Chloride 20 Meq Tablet.er 20 Meq PO BID


     last dose this am


     next dose with supper


Sotalol (Sotalol Hcl) 80 Mg Tablet 40 Mg PO BID


     last dose this am


     next dose tonight


Furosemide 80 Mg Tablet 80 Mg PO DAILY


     last dose this am


     next dose tomorrow


Atorvastatin Calcium 40 Mg Tablet 40 Mg PO HS


     last dose was last dose


     next dose tonight


Metformin Hcl 1,000 Mg Tablet 1,000 Mg PO BID


     last dose this am


     next dose with supper


Vitals/I & O





Vital Sign - Last 24 Hours








 6/7/17 6/7/17 6/7/17 6/7/17





 15:50 19:10 20:10 20:19


 


Temp 98.0 97.5  





 98.0 97.5  


 


Pulse 103 110  119


 


Resp 22 18  


 


B/P (MAP) 156/104 (121) 104/64 (77)  105/63


 


Pulse Ox 96 96  


 


O2 Delivery Room Air Room Air Room Air 


 


    





    





 6/7/17 6/8/17 6/8/17 6/8/17





 23:10 03:15 07:42 08:00


 


Temp 98.0 98.3 97.2 





 98.0 98.3 97.2 


 


Pulse 96 89 103 


 


Resp 18 20 20 


 


B/P (MAP) 140/83 (102) 133/92 (106) 141/94 


 


Pulse Ox 96 96 95 


 


O2 Delivery Room Air Room Air Room Air Room Air


 


    





    





 6/8/17 6/8/17 6/8/17 6/8/17





 09:16 09:16 09:31 09:46


 


Temp 98.4   





 98.4   


 


Pulse 100  103 94


 


Resp 26  20 24


 


B/P (MAP) 127/80  138/102 139/95


 


Pulse Ox 99  97 94


 


O2 Delivery Simple Mask Mask Simple Mask Room Air


 


O2 Flow Rate 10 10 10 


 


    





    





 6/8/17 6/8/17 6/8/17 6/8/17





 10:01 10:40 10:41 10:41


 


Temp 98.2   





 98.2   


 


Pulse 89 114 114 114


 


Resp 16   


 


B/P (MAP) 138/86 132/84 132/84 132/84


 


Pulse Ox 95   


 


O2 Delivery Room Air   


 


    





    





 6/8/17   





 11:00   


 


Temp 98.0   





 98.0   


 


Pulse 93   


 


Resp 18   


 


B/P (MAP) 127/82 (97)   


 


Pulse Ox 97   


 


O2 Delivery Room Air   














Intake and Output   


 


 6/7/17 6/7/17 6/8/17





 15:00 23:00 07:00


 


Intake Total 220 ml  300 ml


 


Output Total 400 ml 600 ml 200 ml


 


Balance -180 ml -600 ml 100 ml

















BRITTNEE AMAYA MD Jun 8, 2017 15:35

## 2017-06-09 VITALS — SYSTOLIC BLOOD PRESSURE: 116 MMHG | DIASTOLIC BLOOD PRESSURE: 72 MMHG

## 2017-06-09 VITALS — SYSTOLIC BLOOD PRESSURE: 111 MMHG | DIASTOLIC BLOOD PRESSURE: 72 MMHG

## 2017-06-09 VITALS — SYSTOLIC BLOOD PRESSURE: 127 MMHG | DIASTOLIC BLOOD PRESSURE: 84 MMHG

## 2017-06-09 VITALS — DIASTOLIC BLOOD PRESSURE: 86 MMHG | SYSTOLIC BLOOD PRESSURE: 131 MMHG

## 2017-06-09 VITALS — DIASTOLIC BLOOD PRESSURE: 99 MMHG | SYSTOLIC BLOOD PRESSURE: 164 MMHG

## 2017-06-09 VITALS — SYSTOLIC BLOOD PRESSURE: 124 MMHG | DIASTOLIC BLOOD PRESSURE: 71 MMHG

## 2017-06-09 RX ADMIN — OXYCODONE HYDROCHLORIDE AND ACETAMINOPHEN PRN TAB: 5; 325 TABLET ORAL at 20:41

## 2017-06-09 RX ADMIN — APIXABAN SCH MG: 5 TABLET, FILM COATED ORAL at 12:51

## 2017-06-09 RX ADMIN — PANTOPRAZOLE SODIUM SCH MG: 40 TABLET, DELAYED RELEASE ORAL at 08:44

## 2017-06-09 RX ADMIN — INSULIN DETEMIR SCH UNITS: 100 INJECTION, SOLUTION SUBCUTANEOUS at 20:54

## 2017-06-09 RX ADMIN — METOPROLOL TARTRATE SCH MG: 50 TABLET, FILM COATED ORAL at 08:40

## 2017-06-09 RX ADMIN — INSULIN ASPART SCH UNITS: 100 INJECTION, SOLUTION INTRAVENOUS; SUBCUTANEOUS at 18:22

## 2017-06-09 RX ADMIN — INSULIN ASPART SCH UNITS: 100 INJECTION, SOLUTION INTRAVENOUS; SUBCUTANEOUS at 08:00

## 2017-06-09 RX ADMIN — APIXABAN SCH MG: 5 TABLET, FILM COATED ORAL at 20:36

## 2017-06-09 RX ADMIN — SERTRALINE HYDROCHLORIDE SCH MG: 50 TABLET ORAL at 08:40

## 2017-06-09 RX ADMIN — DIGOXIN SCH MCG: 250 TABLET ORAL at 08:41

## 2017-06-09 RX ADMIN — METOPROLOL TARTRATE SCH MG: 50 TABLET, FILM COATED ORAL at 20:38

## 2017-06-09 RX ADMIN — INSULIN DETEMIR SCH UNITS: 100 INJECTION, SOLUTION SUBCUTANEOUS at 13:00

## 2017-06-09 RX ADMIN — INSULIN ASPART SCH UNITS: 100 INJECTION, SOLUTION INTRAVENOUS; SUBCUTANEOUS at 12:59

## 2017-06-09 RX ADMIN — ACETAMINOPHEN PRN MG: 325 TABLET, FILM COATED ORAL at 18:20

## 2017-06-09 RX ADMIN — OXYCODONE HYDROCHLORIDE AND ACETAMINOPHEN PRN TAB: 5; 325 TABLET ORAL at 04:45

## 2017-06-09 NOTE — PDOC
PROGRESS NOTES


Subjective


Subjective


She is very weak.





He could not stand on his own without assistance.





The biopsy results are pending.





Objective


Objective





Vital Signs








  Date Time  Temp Pulse Resp B/P (MAP) Pulse Ox O2 Delivery O2 Flow Rate FiO2


 


6/9/17 15:38 97.6 80 18 127/84 (98) 96 Room Air  





 97.6       


 


6/8/17 09:31       10 














Intake and Output 


 


 6/9/17





 07:00


 


Intake Total 2850 ml


 


Output Total 200 ml


 


Balance 2650 ml


 


 


 


Intake Oral 1900 ml


 


IV Total 950 ml


 


Output Urine Total 200 ml


 


# Bowel Movements 1











Physical Exam


Physical Exam


No significant changes in cardiac exam





Assessment


Assessment


This very weak and deconditioned patient is not in my opinion a very good 

surgical candidate for a big open heart surgery with bypass and valve 

replacement.





Before deciding on the treatment modality for his heart problems I would like 

to see the results of the temporal artery biopsy because if he has a positive 

biopsy he would have to be on steroids for several weeks and we would then need 

to wait before anything else would be done with regards to heart surgery.





Comment


Review of Relevant


I have reviewed the following items nirmal (where applicable) has been applied.


Labs





Laboratory Tests








Test


  6/7/17


21:02 6/8/17


07:37 6/8/17


09:52 6/8/17


13:10


 


Glucose (Fingerstick)


  299 mg/dL


(70-99) 107 mg/dL


(70-99) 128 mg/dL


(70-99) 240 mg/dL


(70-99)


 


Test


  6/8/17


17:42 6/8/17


21:09 6/9/17


07:50 6/9/17


11:42


 


Glucose (Fingerstick)


  301 mg/dL


(70-99) 264 mg/dL


(70-99) 127 mg/dL


(70-99) 167 mg/dL


(70-99)








Laboratory Tests








Test


  6/8/17


17:42 6/8/17


21:09 6/9/17


07:50 6/9/17


11:42


 


Glucose (Fingerstick)


  301 mg/dL


(70-99) 264 mg/dL


(70-99) 127 mg/dL


(70-99) 167 mg/dL


(70-99)








Medications





Current Medications


Aspirin (Children'S Aspirin) 324 mg 1X  ONCE PO  Last administered on 6/3/17at 

12:19;  Start 6/3/17 at 12:15;  Stop 6/3/17 at 12:16;  Status DC


Fentanyl Citrate (Fentanyl 2ml Vial) 25 mcg PRN Q15MIN  PRN IV PAIN GREATER 

THAN 3/10 Last administered on 6/3/17at 12:27;  Start 6/3/17 at 12:15;  Stop 6/3

/17 at 15:34;  Status DC


Nitroglycerin (Nitrostat) 0.4 mg PRN Q5MIN  PRN SL CHEST PAIN;  Start 6/3/17 at 

12:15


Amiodarone HCl 150 mg/Dextrose 103 ml @  618 mls/hr 1X  ONCE IV  Last 

administered on 6/3/17at 12:24;  Start 6/3/17 at 12:15;  Stop 6/3/17 at 12:24;  

Status DC


Amiodarone HCl 900 mg/Dextrose 518 ml @ 0 mls/hr CONT  PRN IV SEE I/O RECORD 

Last administered on 6/3/17at 12:34;  Start 6/3/17 at 12:15;  Stop 6/3/17 at 12:

35;  Status DC


Sodium Chloride 1,000 ml @  1,000 mls/hr 1X  ONCE IV  Last administered on 6/3/

17at 12:35;  Start 6/3/17 at 12:45;  Stop 6/3/17 at 13:44;  Status DC


Digoxin (Lanoxin) 500 mcg 1X  ONCE PO  Last administered on 6/3/17at 12:50;  

Start 6/3/17 at 13:00;  Stop 6/3/17 at 13:01;  Status DC


Sodium Chloride 500 ml @  500 mls/hr 1X  ONCE IV ;  Start 6/3/17 at 12:45;  

Stop 6/3/17 at 13:44;  Status DC


Ondansetron HCl (Zofran) 4 mg PRN Q8HRS  PRN IV NAUSEA/VOMITING;  Start 6/3/17 

at 15:30;  Stop 6/4/17 at 15:29;  Status DC


Fentanyl Citrate (Fentanyl 2ml Vial) 50 mcg PRN Q2HR  PRN IV PAIN Last 

administered on 6/4/17at 14:07;  Start 6/3/17 at 15:30;  Stop 6/4/17 at 15:29;  

Status DC


Acetaminophen (Tylenol) 650 mg PRN Q4HRS  PRN PO FEVER;  Start 6/3/17 at 15:30;

  Stop 6/4/17 at 15:29;  Status DC


Nitroglycerin (Nitrostat) 0.4 mg PRN Q5MIN  PRN SL CHEST PAIN;  Start 6/3/17 at 

15:30;  Stop 6/3/17 at 15:34;  Status DC


Insulin Aspart (NovoLOG) 0-5 UNITS TIDWMEALS SQ  Last administered on 6/9/17at 

12:59;  Start 6/3/17 at 17:00


Dextrose (Dextrose 50%-Water Syringe) 12.5 gm PRN Q15MIN  PRN IV SEE COMMENTS;  

Start 6/3/17 at 15:30


Metoprolol Tartrate (Lopressor) 2.5 mg Q6HRS IVP ;  Start 6/4/17 at 19:30;  

Stop 6/4/17 at 19:30;  Status DC


Enoxaparin Sodium (Lovenox 150mg Syringe) 130 mg 1X  ONCE SQ  Last administered 

on 6/3/17at 19:51;  Start 6/3/17 at 19:45;  Stop 6/3/17 at 19:48;  Status DC


Metoprolol Tartrate (Lopressor) 2.5 mg Q6HRS IVP  Last administered on 6/4/17at 

05:35;  Start 6/3/17 at 20:30;  Stop 6/4/17 at 12:53;  Status DC


Levothyroxine Sodium (Synthroid) 50 mcg DAILY07 PO ;  Start 6/4/17 at 13:30;  

Stop 6/4/17 at 14:26;  Status DC


Sodium Chloride 1,000 ml @  75 mls/hr [id number] IV  Last administered on 6/5/17at 

02:50;  Start 6/4/17 at 13:30;  Stop 6/5/17 at 19:05;  Status DC


Enoxaparin Sodium (Lovenox Per Pharmacy Treatment Dosing) 1 each PRN DAILY  PRN 

MC SEE COMMENTS;  Start 6/4/17 at 13:00;  Status Cancel


Metoprolol Tartrate (Lopressor) 5 mg Q6HRS IVP  Last administered on 6/5/17at 19

:00;  Start 6/4/17 at 13:30;  Stop 6/5/17 at 21:22;  Status DC


Enoxaparin Sodium (Lovenox 150mg Syringe) 130 mg BID SQ  Last administered on 6/ 4/17at 14:03;  Start 6/4/17 at 13:30;  Stop 6/5/17 at 19:54;  Status DC


Sertraline HCl (Zoloft) 50 mg DAILY PO  Last administered on 6/9/17at 08:40;  

Start 6/4/17 at 14:30


Non-Formulary Medication 2 each BID PO ;  Start 6/4/17 at 21:00;  Status UNV


Info (Anti-Coagulation Monitoring By Pharmacy) 1 each PRN DAILY  PRN MC SEE 

COMMENTS Last administered on 6/5/17at 08:20;  Start 6/5/17 at 08:15;  Stop 6/6/ 17 at 16:21;  Status DC


Lidocaine HCl 20 ml STK-MED ONCE .ROUTE ;  Start 6/5/17 at 13:40;  Stop 6/5/17 

at 13:41;  Status DC


Heparin Sodium/ Sodium Chloride 1,000 ml @  As Directed STK-MED ONCE .ROUTE ;  

Start 6/5/17 at 13:40;  Stop 6/5/17 at 13:41;  Status DC


Iodixanol (Visipaque 320) 100 ml STK-MED ONCE .ROUTE ;  Start 6/5/17 at 13:40;  

Stop 6/5/17 at 13:41;  Status DC


Midazolam HCl (Versed) 2 mg STK-MED ONCE .ROUTE ;  Start 6/5/17 at 14:02;  Stop 

6/5/17 at 14:03;  Status DC


Fentanyl Citrate (Fentanyl 2ml Vial) 100 mcg STK-MED ONCE .ROUTE ;  Start 6/5/ 17 at 14:03;  Stop 6/5/17 at 14:04;  Status DC


Heparin Sodium/ Sodium Chloride 500 ml @  As Directed STK-MED ONCE .ROUTE ;  

Start 6/5/17 at 14:32;  Stop 6/5/17 at 14:33;  Status DC


Nitroglycerin/ Dextrose 250 ml @  As Directed STK-MED ONCE IV ;  Start 6/5/17 

at 14:39;  Stop 6/5/17 at 14:40;  Status DC


Adenosine (Adenoscan) 90 mg STK-MED ONCE IV ;  Start 6/5/17 at 14:49;  Stop 6/5/ 17 at 14:50;  Status DC


Hydralazine HCl (Apresoline) 20 mg STK-MED ONCE .ROUTE ;  Start 6/5/17 at 15:06

;  Stop 6/5/17 at 15:07;  Status DC


Heparin Sodium (Porcine) (Heparin Sodium) 10,000 unit STK-MED ONCE .ROUTE ;  

Start 6/5/17 at 15:17;  Stop 6/5/17 at 15:18;  Status DC


Heparin Sodium/ Sodium Chloride 1,000 unit 1X  ONCE IART  Last administered on 6 /5/17at 15:28;  Start 6/5/17 at 15:30;  Stop 6/5/17 at 15:31;  Status DC


Midazolam HCl (Versed) 2 mg 1X  ONCE IV ;  Start 6/5/17 at 15:30;  Stop 6/5/17 

at 15:31;  Status DC


Fentanyl Citrate (Fentanyl 2ml Vial) 100 mcg 1X  ONCE IV ;  Start 6/5/17 at 15:

30;  Stop 6/5/17 at 15:31;  Status DC


Iodixanol (Visipaque 320) 100 ml 1X  ONCE IART  Last administered on 6/5/17at 15

:28;  Start 6/5/17 at 15:30;  Stop 6/5/17 at 15:31;  Status DC


Heparin Sodium (Porcine) (Heparin Sodium) 4,000 unit 1X  ONCE IV  Last 

administered on 6/5/17at 15:32;  Start 6/5/17 at 15:30;  Stop 6/5/17 at 15:31;  

Status DC


Adenosine 90 mg/ Sodium Chloride 120 ml @  200 mls/hr 1X  ONCE IV  Last 

administered on 6/5/17at 15:29;  Start 6/5/17 at 15:30;  Stop 6/5/17 at 16:05;  

Status DC


Hydralazine HCl (Apresoline) 5 mg 1X  ONCE IVP  Last administered on 6/5/17at 15

:30;  Start 6/5/17 at 15:30;  Stop 6/5/17 at 15:31;  Status DC


Info (Do NOT chart on this entry -- for MONITORING) 1 each PRN DAILY  PRN MC 

SEE COMMENTS;  Start 6/5/17 at 15:30;  Stop 6/7/17 at 15:29;  Status DC


Amlodipine Besylate (Norvasc) 5 mg DAILY PO  Last administered on 6/6/17at 08:30

;  Start 6/6/17 at 09:00;  Stop 6/6/17 at 23:22;  Status DC


Amlodipine Besylate (Norvasc) 5 mg 1X  ONCE PO  Last administered on 6/5/17at 21

:36;  Start 6/5/17 at 21:15;  Stop 6/5/17 at 21:16;  Status DC


Metoprolol Tartrate (Lopressor) 50 mg Q8HRS PO  Last administered on 6/6/17at 21

:39;  Start 6/5/17 at 22:00;  Stop 6/6/17 at 23:22;  Status DC


Acetaminophen (Tylenol) 650 mg PRN QID  PRN PO PAIN Last administered on 6/8/ 17at 10:48;  Start 6/5/17 at 22:00


Pantoprazole Sodium (Protonix) 40 mg DAILYAC PO  Last administered on 6/9/17at 

08:44;  Start 6/6/17 at 11:30


Digoxin (Lanoxin) 500 mcg 1X  ONCE PO  Last administered on 6/6/17at 09:28;  

Start 6/6/17 at 08:45;  Stop 6/6/17 at 08:55;  Status DC


Digoxin (Lanoxin) 250 mcg DAILY PO  Last administered on 6/9/17at 08:41;  Start 

6/6/17 at 16:00


Metoprolol Tartrate (Lopressor) 50 mg BID PO  Last administered on 6/9/17at 08:

40;  Start 6/7/17 at 09:00


Diltiazem HCl (Cardizem 24hr Cd) 180 mg DAILY PO  Last administered on 6/9/17at 

08:40;  Start 6/7/17 at 09:00;  Stop 6/9/17 at 08:58;  Status DC


Prednisone (Prednisone) 40 mg DAILY PO  Last administered on 6/9/17at 08:40;  

Start 6/7/17 at 09:30


Metformin HCl (Glucophage) 1,000 mg BIDWMEALS PO  Last administered on 6/9/17at 

08:40;  Start 6/7/17 at 17:00


Levofloxacin/ Dextrose 100 ml @  100 mls/hr 1X PREOP  PRN IV prophylaxis Last 

administered on 6/8/17at 08:44;  Start 6/8/17 at 06:00;  Stop 6/8/17 at 18:00;  

Status DC


Fentanyl Citrate (Fentanyl 2ml Vial) 25 mcg PRN Q5MIN  PRN IV MILD PAIN;  Start 

6/8/17 at 07:00;  Stop 6/9/17 at 06:59;  Status DC


Fentanyl Citrate (Fentanyl 2ml Vial) 50 mcg PRN Q5MIN  PRN IV MODERATE PAIN;  

Start 6/8/17 at 07:00;  Stop 6/9/17 at 06:59;  Status DC


Morphine Sulfate 1 mg PRN Q10MIN  PRN IV SEVERE PAIN;  Start 6/8/17 at 07:00;  

Stop 6/9/17 at 06:59;  Status DC


Ringer's Solution 1,000 ml @  0 mls/hr Q0M IV ;  Start 6/8/17 at 07:00;  Stop 6/ 8/17 at 18:59;  Status DC


Lidocaine HCl 2 ml PRN 1X  PRN ID PRIOR TO IV START;  Start 6/8/17 at 07:00;  

Stop 6/9/17 at 06:59;  Status DC


Hydromorphone HCl (Dilaudid) 0.5 mg PRN Q10MIN  PRN IV SEV PAIN, Second choice;

  Start 6/8/17 at 07:00;  Stop 6/9/17 at 06:59;  Status DC


Prochlorperazine Edisylate (Compazine) 5 mg PACU PRN  PRN IV NAUSEA, MRX1;  

Start 6/8/17 at 07:00;  Stop 6/9/17 at 06:59;  Status DC


Bupivacaine HCl/ Epinephrine Bitart (Sensorcain-Mpf Epi 0.5%-1:019999) 30 ml STK

-MED ONCE .ROUTE ;  Start 6/8/17 at 07:38;  Stop 6/8/17 at 07:39;  Status DC


Bupivacaine HCl (Sensorcaine Mpf 0.5%) 30 ml STK-MED ONCE .ROUTE  Last 

administered on 6/8/17at 09:10;  Start 6/8/17 at 07:38;  Stop 6/8/17 at 07:39;  

Status DC


Fentanyl Citrate (Fentanyl 2ml Vial) 100 mcg STK-MED ONCE .ROUTE ;  Start 6/8/ 17 at 08:21;  Stop 6/8/17 at 08:22;  Status DC


Neomycin/ Polymyxin/ Bacitracin (Triple Antibiotic Ointment) 1 pkt STK-MED ONCE 

TP ;  Start 6/8/17 at 08:46;  Stop 6/8/17 at 08:47;  Status DC


Dexamethasone Sodium Phosphate (Decadron) 20 mg STK-MED ONCE .ROUTE ;  Start 6/8 /17 at 09:08;  Stop 6/8/17 at 09:09;  Status DC


Ondansetron HCl (Zofran) 4 mg STK-MED ONCE .ROUTE ;  Start 6/8/17 at 09:08;  

Stop 6/8/17 at 09:09;  Status DC


Propofol 20 ml @ As Directed STK-MED ONCE IV ;  Start 6/8/17 at 09:08;  Stop 6/8 /17 at 09:09;  Status DC


Lidocaine HCl (Lidocaine Pf 2% Vial) 5 ml STK-MED ONCE .ROUTE ;  Start 6/8/17 

at 09:08;  Stop 6/8/17 at 09:09;  Status DC


Sevoflurane (Ultane) 30 ml STK-MED ONCE IH ;  Start 6/8/17 at 09:08;  Stop 6/8/ 17 at 09:09;  Status DC


Phenylephrine HCl 1 mg STK-MED ONCE IV ;  Start 6/8/17 at 09:08;  Stop 6/8/17 

at 09:09;  Status DC


Oxycodone/ Acetaminophen (Percocet 5/325) 1 tab PRN Q6HRS  PRN PO PAIN Last 

administered on 6/9/17at 04:45;  Start 6/8/17 at 16:00


Insulin Aspart (NovoLOG) 2 units 1X  ONCE SQ  Last administered on 6/8/17at 21:

51;  Start 6/8/17 at 21:30;  Stop 6/8/17 at 21:31;  Status DC


Insulin Detemir (Levemir) 10 units BID SQ  Last administered on 6/9/17at 13:00;

  Start 6/9/17 at 09:00


Diltiazem HCl (Cardizem 24hr Cd) 240 mg DAILY PO ;  Start 6/9/17 at 09:00


Apixaban (Eliquis) 5 mg BID PO  Last administered on 6/9/17at 12:51;  Start 6/9/ 17 at 10:00





Active Scripts


Active


Pantoprazole Sodium 40 Mg Tablet. 40 Mg PO BIDAC


Reported


Stephanie Back & Body Caplet (Aspirin/Caffeine) 1 Each Tablet 2 Each PO BID


Zoloft (Sertraline Hcl) 50 Mg Tablet 1 Tab PO DAILY


Glipizide 5 Mg Tablet 0.5 Tab PO BID


Potassium Chloride 20 Meq Tablet.er 20 Meq PO BID


     last dose this am


     next dose with supper


Sotalol (Sotalol Hcl) 80 Mg Tablet 40 Mg PO BID


     last dose this am


     next dose tonight


Furosemide 80 Mg Tablet 80 Mg PO DAILY


     last dose this am


     next dose tomorrow


Atorvastatin Calcium 40 Mg Tablet 40 Mg PO HS


     last dose was last dose


     next dose tonight


Metformin Hcl 1,000 Mg Tablet 1,000 Mg PO BID


     last dose this am


     next dose with supper


Vitals/I & O





Vital Sign - Last 24 Hours








 6/8/17 6/8/17 6/8/17 6/8/17





 19:40 20:00 21:12 23:00


 


Temp 97.4   97.7





 97.4   97.7


 


Pulse 107  107 107


 


Resp 22   22


 


B/P (MAP) 124/79 (94)  124/79 142/99 (113)


 


Pulse Ox 94   93


 


O2 Delivery Room Air Room Air  Room Air


 


    





    





 6/9/17 6/9/17 6/9/17 6/9/17





 03:41 04:45 05:53 07:00


 


Temp 97.9   97.3





 97.9   97.3


 


Pulse 118   94


 


Resp 26 20 18 18


 


B/P (MAP) 164/99 (120)   131/86 (101)


 


Pulse Ox 95 95 95 95


 


O2 Delivery Room Air Room Air Room Air Room Air


 


    





    





 6/9/17 6/9/17 6/9/17 6/9/17





 08:00 08:40 08:40 08:41


 


Pulse  94 94 94


 


B/P (MAP)  131/86 131/86 131/86


 


O2 Delivery Room Air   





 6/9/17 6/9/17 6/9/17 





 09:00 11:00 15:38 


 


Temp  98.0 97.6 





  98.0 97.6 


 


Pulse 82 82 80 


 


Resp  20 18 


 


B/P (MAP) 102/64 111/72 (85) 127/84 (98) 


 


Pulse Ox  94 96 


 


O2 Delivery  Room Air Room Air 














Intake and Output   


 


 6/8/17 6/8/17 6/9/17





 15:00 23:00 07:00


 


Intake Total 1190 ml 1420 ml 240 ml


 


Output Total   200 ml


 


Balance 1190 ml 1420 ml 40 ml

















BRITTNEE AMAYA MD Jun 9, 2017 17:29

## 2017-06-09 NOTE — PDOC
Provider Note


Provider Note


add eliquis for now re high risk AF , can stop if any procedures needed- Chads2 

score 2











DAPHNEY NAIR MD Jun 9, 2017 09:02

## 2017-06-09 NOTE — PDOC
Provider Note


Provider Note


vss, still af w/ rapid rate at times- feels HA some better after 3 days steroid

, biopsy pending- add low dose levemir, more dilt re rate











DAPHNEY NAIR MD Jun 9, 2017 08:58

## 2017-06-10 VITALS — DIASTOLIC BLOOD PRESSURE: 90 MMHG | SYSTOLIC BLOOD PRESSURE: 147 MMHG

## 2017-06-10 VITALS — DIASTOLIC BLOOD PRESSURE: 77 MMHG | SYSTOLIC BLOOD PRESSURE: 132 MMHG

## 2017-06-10 VITALS — DIASTOLIC BLOOD PRESSURE: 87 MMHG | SYSTOLIC BLOOD PRESSURE: 149 MMHG

## 2017-06-10 VITALS — DIASTOLIC BLOOD PRESSURE: 56 MMHG | SYSTOLIC BLOOD PRESSURE: 107 MMHG

## 2017-06-10 VITALS — DIASTOLIC BLOOD PRESSURE: 98 MMHG | SYSTOLIC BLOOD PRESSURE: 143 MMHG

## 2017-06-10 VITALS — DIASTOLIC BLOOD PRESSURE: 82 MMHG | SYSTOLIC BLOOD PRESSURE: 136 MMHG

## 2017-06-10 RX ADMIN — INSULIN ASPART SCH UNITS: 100 INJECTION, SOLUTION INTRAVENOUS; SUBCUTANEOUS at 18:08

## 2017-06-10 RX ADMIN — INSULIN ASPART SCH UNITS: 100 INJECTION, SOLUTION INTRAVENOUS; SUBCUTANEOUS at 08:00

## 2017-06-10 RX ADMIN — METOPROLOL TARTRATE SCH MG: 50 TABLET, FILM COATED ORAL at 09:04

## 2017-06-10 RX ADMIN — METOPROLOL TARTRATE SCH MG: 50 TABLET, FILM COATED ORAL at 20:32

## 2017-06-10 RX ADMIN — INSULIN DETEMIR SCH UNITS: 100 INJECTION, SOLUTION SUBCUTANEOUS at 09:00

## 2017-06-10 RX ADMIN — INSULIN DETEMIR SCH UNITS: 100 INJECTION, SOLUTION SUBCUTANEOUS at 20:35

## 2017-06-10 RX ADMIN — SERTRALINE HYDROCHLORIDE SCH MG: 50 TABLET ORAL at 09:04

## 2017-06-10 RX ADMIN — DIGOXIN SCH MCG: 250 TABLET ORAL at 09:03

## 2017-06-10 RX ADMIN — PANTOPRAZOLE SODIUM SCH MG: 40 TABLET, DELAYED RELEASE ORAL at 06:29

## 2017-06-10 RX ADMIN — INSULIN ASPART SCH UNITS: 100 INJECTION, SOLUTION INTRAVENOUS; SUBCUTANEOUS at 12:00

## 2017-06-10 RX ADMIN — APIXABAN SCH MG: 5 TABLET, FILM COATED ORAL at 09:03

## 2017-06-10 NOTE — PDOC
PROGRESS NOTES


Subjective


Subjective


Patient is very confused today.





He had a Percocet last night to help with the severe headache.





Objective


Objective





Vital Signs








  Date Time  Temp Pulse Resp B/P (MAP) Pulse Ox O2 Delivery O2 Flow Rate FiO2


 


6/10/17 11:00 97.1 72 18 149/87 (107) 95 Room Air  





 97.1       


 


6/8/17 09:31       10 














Intake and Output 


 


 6/10/17





 07:00


 


Intake Total 300 ml


 


Output Total 100 ml


 


Balance 200 ml


 


 


 


Intake Oral 300 ml


 


Output Urine Total 100 ml


 


# Voids 1











Physical Exam


Physical Exam


No significant changes in cardiac exam





Assessment


Assessment


The patient's confusion may be secondary to a combination of the premedications 

with the hospital stay and his dementia. I have been of the opinion that the 

patient has been developing dementia for some time but this has not been 

confirmed.





The biopsy results are pending.





Once we have the results of the biopsy will then discuss with the patient and 

the family about what to do to follow.





Comment


Review of Relevant


I have reviewed the following items nirmal (where applicable) has been applied.


Labs





Laboratory Tests








Test


  6/8/17


17:42 6/8/17


21:09 6/9/17


07:50 6/9/17


11:42


 


Glucose (Fingerstick)


  301 mg/dL


(70-99) 264 mg/dL


(70-99) 127 mg/dL


(70-99) 167 mg/dL


(70-99)


 


Test


  6/9/17


17:27 6/9/17


20:42 6/10/17


07:58 6/10/17


11:54


 


Glucose (Fingerstick)


  210 mg/dL


(70-99) 247 mg/dL


(70-99) 70 mg/dL


(70-99) 117 mg/dL


(70-99)








Laboratory Tests








Test


  6/9/17


17:27 6/9/17


20:42 6/10/17


07:58 6/10/17


11:54


 


Glucose (Fingerstick)


  210 mg/dL


(70-99) 247 mg/dL


(70-99) 70 mg/dL


(70-99) 117 mg/dL


(70-99)








Medications





Current Medications


Aspirin (Children'S Aspirin) 324 mg 1X  ONCE PO  Last administered on 6/3/17at 

12:19;  Start 6/3/17 at 12:15;  Stop 6/3/17 at 12:16;  Status DC


Fentanyl Citrate (Fentanyl 2ml Vial) 25 mcg PRN Q15MIN  PRN IV PAIN GREATER 

THAN 3/10 Last administered on 6/3/17at 12:27;  Start 6/3/17 at 12:15;  Stop 6/3

/17 at 15:34;  Status DC


Nitroglycerin (Nitrostat) 0.4 mg PRN Q5MIN  PRN SL CHEST PAIN;  Start 6/3/17 at 

12:15


Amiodarone HCl 150 mg/Dextrose 103 ml @  618 mls/hr 1X  ONCE IV  Last 

administered on 6/3/17at 12:24;  Start 6/3/17 at 12:15;  Stop 6/3/17 at 12:24;  

Status DC


Amiodarone HCl 900 mg/Dextrose 518 ml @ 0 mls/hr CONT  PRN IV SEE I/O RECORD 

Last administered on 6/3/17at 12:34;  Start 6/3/17 at 12:15;  Stop 6/3/17 at 12:

35;  Status DC


Sodium Chloride 1,000 ml @  1,000 mls/hr 1X  ONCE IV  Last administered on 6/3/

17at 12:35;  Start 6/3/17 at 12:45;  Stop 6/3/17 at 13:44;  Status DC


Digoxin (Lanoxin) 500 mcg 1X  ONCE PO  Last administered on 6/3/17at 12:50;  

Start 6/3/17 at 13:00;  Stop 6/3/17 at 13:01;  Status DC


Sodium Chloride 500 ml @  500 mls/hr 1X  ONCE IV ;  Start 6/3/17 at 12:45;  

Stop 6/3/17 at 13:44;  Status DC


Ondansetron HCl (Zofran) 4 mg PRN Q8HRS  PRN IV NAUSEA/VOMITING;  Start 6/3/17 

at 15:30;  Stop 6/4/17 at 15:29;  Status DC


Fentanyl Citrate (Fentanyl 2ml Vial) 50 mcg PRN Q2HR  PRN IV PAIN Last 

administered on 6/4/17at 14:07;  Start 6/3/17 at 15:30;  Stop 6/4/17 at 15:29;  

Status DC


Acetaminophen (Tylenol) 650 mg PRN Q4HRS  PRN PO FEVER;  Start 6/3/17 at 15:30;

  Stop 6/4/17 at 15:29;  Status DC


Nitroglycerin (Nitrostat) 0.4 mg PRN Q5MIN  PRN SL CHEST PAIN;  Start 6/3/17 at 

15:30;  Stop 6/3/17 at 15:34;  Status DC


Insulin Aspart (NovoLOG) 0-5 UNITS TIDWMEALS SQ  Last administered on 6/9/17at 

18:22;  Start 6/3/17 at 17:00


Dextrose (Dextrose 50%-Water Syringe) 12.5 gm PRN Q15MIN  PRN IV SEE COMMENTS;  

Start 6/3/17 at 15:30


Metoprolol Tartrate (Lopressor) 2.5 mg Q6HRS IVP ;  Start 6/4/17 at 19:30;  

Stop 6/4/17 at 19:30;  Status DC


Enoxaparin Sodium (Lovenox 150mg Syringe) 130 mg 1X  ONCE SQ  Last administered 

on 6/3/17at 19:51;  Start 6/3/17 at 19:45;  Stop 6/3/17 at 19:48;  Status DC


Metoprolol Tartrate (Lopressor) 2.5 mg Q6HRS IVP  Last administered on 6/4/17at 

05:35;  Start 6/3/17 at 20:30;  Stop 6/4/17 at 12:53;  Status DC


Levothyroxine Sodium (Synthroid) 50 mcg DAILY07 PO ;  Start 6/4/17 at 13:30;  

Stop 6/4/17 at 14:26;  Status DC


Sodium Chloride 1,000 ml @  75 mls/hr [id number] IV  Last administered on 6/5/17at 

02:50;  Start 6/4/17 at 13:30;  Stop 6/5/17 at 19:05;  Status DC


Enoxaparin Sodium (Lovenox Per Pharmacy Treatment Dosing) 1 each PRN DAILY  PRN 

MC SEE COMMENTS;  Start 6/4/17 at 13:00;  Status Cancel


Metoprolol Tartrate (Lopressor) 5 mg Q6HRS IVP  Last administered on 6/5/17at 19

:00;  Start 6/4/17 at 13:30;  Stop 6/5/17 at 21:22;  Status DC


Enoxaparin Sodium (Lovenox 150mg Syringe) 130 mg BID SQ  Last administered on 6/ 4/17at 14:03;  Start 6/4/17 at 13:30;  Stop 6/5/17 at 19:54;  Status DC


Sertraline HCl (Zoloft) 50 mg DAILY PO  Last administered on 6/10/17at 09:04;  

Start 6/4/17 at 14:30


Non-Formulary Medication 2 each BID PO ;  Start 6/4/17 at 21:00;  Status UNV


Info (Anti-Coagulation Monitoring By Pharmacy) 1 each PRN DAILY  PRN MC SEE 

COMMENTS Last administered on 6/5/17at 08:20;  Start 6/5/17 at 08:15;  Stop 6/6/ 17 at 16:21;  Status DC


Lidocaine HCl 20 ml STK-MED ONCE .ROUTE ;  Start 6/5/17 at 13:40;  Stop 6/5/17 

at 13:41;  Status DC


Heparin Sodium/ Sodium Chloride 1,000 ml @  As Directed STK-MED ONCE .ROUTE ;  

Start 6/5/17 at 13:40;  Stop 6/5/17 at 13:41;  Status DC


Iodixanol (Visipaque 320) 100 ml STK-MED ONCE .ROUTE ;  Start 6/5/17 at 13:40;  

Stop 6/5/17 at 13:41;  Status DC


Midazolam HCl (Versed) 2 mg STK-MED ONCE .ROUTE ;  Start 6/5/17 at 14:02;  Stop 

6/5/17 at 14:03;  Status DC


Fentanyl Citrate (Fentanyl 2ml Vial) 100 mcg STK-MED ONCE .ROUTE ;  Start 6/5/ 17 at 14:03;  Stop 6/5/17 at 14:04;  Status DC


Heparin Sodium/ Sodium Chloride 500 ml @  As Directed STK-MED ONCE .ROUTE ;  

Start 6/5/17 at 14:32;  Stop 6/5/17 at 14:33;  Status DC


Nitroglycerin/ Dextrose 250 ml @  As Directed STK-MED ONCE IV ;  Start 6/5/17 

at 14:39;  Stop 6/5/17 at 14:40;  Status DC


Adenosine (Adenoscan) 90 mg STK-MED ONCE IV ;  Start 6/5/17 at 14:49;  Stop 6/5/ 17 at 14:50;  Status DC


Hydralazine HCl (Apresoline) 20 mg STK-MED ONCE .ROUTE ;  Start 6/5/17 at 15:06

;  Stop 6/5/17 at 15:07;  Status DC


Heparin Sodium (Porcine) (Heparin Sodium) 10,000 unit STK-MED ONCE .ROUTE ;  

Start 6/5/17 at 15:17;  Stop 6/5/17 at 15:18;  Status DC


Heparin Sodium/ Sodium Chloride 1,000 unit 1X  ONCE IART  Last administered on 6 /5/17at 15:28;  Start 6/5/17 at 15:30;  Stop 6/5/17 at 15:31;  Status DC


Midazolam HCl (Versed) 2 mg 1X  ONCE IV ;  Start 6/5/17 at 15:30;  Stop 6/5/17 

at 15:31;  Status DC


Fentanyl Citrate (Fentanyl 2ml Vial) 100 mcg 1X  ONCE IV ;  Start 6/5/17 at 15:

30;  Stop 6/5/17 at 15:31;  Status DC


Iodixanol (Visipaque 320) 100 ml 1X  ONCE IART  Last administered on 6/5/17at 15

:28;  Start 6/5/17 at 15:30;  Stop 6/5/17 at 15:31;  Status DC


Heparin Sodium (Porcine) (Heparin Sodium) 4,000 unit 1X  ONCE IV  Last 

administered on 6/5/17at 15:32;  Start 6/5/17 at 15:30;  Stop 6/5/17 at 15:31;  

Status DC


Adenosine 90 mg/ Sodium Chloride 120 ml @  200 mls/hr 1X  ONCE IV  Last 

administered on 6/5/17at 15:29;  Start 6/5/17 at 15:30;  Stop 6/5/17 at 16:05;  

Status DC


Hydralazine HCl (Apresoline) 5 mg 1X  ONCE IVP  Last administered on 6/5/17at 15

:30;  Start 6/5/17 at 15:30;  Stop 6/5/17 at 15:31;  Status DC


Info (Do NOT chart on this entry -- for MONITORING) 1 each PRN DAILY  PRN MC 

SEE COMMENTS;  Start 6/5/17 at 15:30;  Stop 6/7/17 at 15:29;  Status DC


Amlodipine Besylate (Norvasc) 5 mg DAILY PO  Last administered on 6/6/17at 08:30

;  Start 6/6/17 at 09:00;  Stop 6/6/17 at 23:22;  Status DC


Amlodipine Besylate (Norvasc) 5 mg 1X  ONCE PO  Last administered on 6/5/17at 21

:36;  Start 6/5/17 at 21:15;  Stop 6/5/17 at 21:16;  Status DC


Metoprolol Tartrate (Lopressor) 50 mg Q8HRS PO  Last administered on 6/6/17at 21

:39;  Start 6/5/17 at 22:00;  Stop 6/6/17 at 23:22;  Status DC


Acetaminophen (Tylenol) 650 mg PRN QID  PRN PO PAIN Last administered on 6/9/ 17at 18:20;  Start 6/5/17 at 22:00


Pantoprazole Sodium (Protonix) 40 mg DAILYAC PO  Last administered on 6/10/17at 

06:29;  Start 6/6/17 at 11:30


Digoxin (Lanoxin) 500 mcg 1X  ONCE PO  Last administered on 6/6/17at 09:28;  

Start 6/6/17 at 08:45;  Stop 6/6/17 at 08:55;  Status DC


Digoxin (Lanoxin) 250 mcg DAILY PO  Last administered on 6/10/17at 09:03;  

Start 6/6/17 at 16:00;  Stop 6/10/17 at 12:02;  Status DC


Metoprolol Tartrate (Lopressor) 50 mg BID PO  Last administered on 6/10/17at 09:

04;  Start 6/7/17 at 09:00


Diltiazem HCl (Cardizem 24hr Cd) 180 mg DAILY PO  Last administered on 6/9/17at 

08:40;  Start 6/7/17 at 09:00;  Stop 6/9/17 at 08:58;  Status DC


Prednisone (Prednisone) 40 mg DAILY PO  Last administered on 6/10/17at 09:04;  

Start 6/7/17 at 09:30


Metformin HCl (Glucophage) 1,000 mg BIDWMEALS PO  Last administered on 6/10/

17at 09:04;  Start 6/7/17 at 17:00


Levofloxacin/ Dextrose 100 ml @  100 mls/hr 1X PREOP  PRN IV prophylaxis Last 

administered on 6/8/17at 08:44;  Start 6/8/17 at 06:00;  Stop 6/8/17 at 18:00;  

Status DC


Fentanyl Citrate (Fentanyl 2ml Vial) 25 mcg PRN Q5MIN  PRN IV MILD PAIN;  Start 

6/8/17 at 07:00;  Stop 6/9/17 at 06:59;  Status DC


Fentanyl Citrate (Fentanyl 2ml Vial) 50 mcg PRN Q5MIN  PRN IV MODERATE PAIN;  

Start 6/8/17 at 07:00;  Stop 6/9/17 at 06:59;  Status DC


Morphine Sulfate 1 mg PRN Q10MIN  PRN IV SEVERE PAIN;  Start 6/8/17 at 07:00;  

Stop 6/9/17 at 06:59;  Status DC


Ringer's Solution 1,000 ml @  0 mls/hr Q0M IV ;  Start 6/8/17 at 07:00;  Stop 6/ 8/17 at 18:59;  Status DC


Lidocaine HCl 2 ml PRN 1X  PRN ID PRIOR TO IV START;  Start 6/8/17 at 07:00;  

Stop 6/9/17 at 06:59;  Status DC


Hydromorphone HCl (Dilaudid) 0.5 mg PRN Q10MIN  PRN IV SEV PAIN, Second choice;

  Start 6/8/17 at 07:00;  Stop 6/9/17 at 06:59;  Status DC


Prochlorperazine Edisylate (Compazine) 5 mg PACU PRN  PRN IV NAUSEA, MRX1;  

Start 6/8/17 at 07:00;  Stop 6/9/17 at 06:59;  Status DC


Bupivacaine HCl/ Epinephrine Bitart (Sensorcain-Mpf Epi 0.5%-[id number]) 30 ml STK

-MED ONCE .ROUTE ;  Start 6/8/17 at 07:38;  Stop 6/8/17 at 07:39;  Status DC


Bupivacaine HCl (Sensorcaine Mpf 0.5%) 30 ml STK-MED ONCE .ROUTE  Last 

administered on 6/8/17at 09:10;  Start 6/8/17 at 07:38;  Stop 6/8/17 at 07:39;  

Status DC


Fentanyl Citrate (Fentanyl 2ml Vial) 100 mcg STK-MED ONCE .ROUTE ;  Start 6/8/ 17 at 08:21;  Stop 6/8/17 at 08:22;  Status DC


Neomycin/ Polymyxin/ Bacitracin (Triple Antibiotic Ointment) 1 pkt STK-MED ONCE 

TP ;  Start 6/8/17 at 08:46;  Stop 6/8/17 at 08:47;  Status DC


Dexamethasone Sodium Phosphate (Decadron) 20 mg STK-MED ONCE .ROUTE ;  Start 6/8 /17 at 09:08;  Stop 6/8/17 at 09:09;  Status DC


Ondansetron HCl (Zofran) 4 mg STK-MED ONCE .ROUTE ;  Start 6/8/17 at 09:08;  

Stop 6/8/17 at 09:09;  Status DC


Propofol 20 ml @ As Directed STK-MED ONCE IV ;  Start 6/8/17 at 09:08;  Stop 6/8 /17 at 09:09;  Status DC


Lidocaine HCl (Lidocaine Pf 2% Vial) 5 ml STK-MED ONCE .ROUTE ;  Start 6/8/17 

at 09:08;  Stop 6/8/17 at 09:09;  Status DC


Sevoflurane (Ultane) 30 ml STK-MED ONCE IH ;  Start 6/8/17 at 09:08;  Stop 6/8/ 17 at 09:09;  Status DC


Phenylephrine HCl 1 mg STK-MED ONCE IV ;  Start 6/8/17 at 09:08;  Stop 6/8/17 

at 09:09;  Status DC


Oxycodone/ Acetaminophen (Percocet 5/325) 1 tab PRN Q6HRS  PRN PO PAIN Last 

administered on 6/9/17at 20:41;  Start 6/8/17 at 16:00


Insulin Aspart (NovoLOG) 2 units 1X  ONCE SQ  Last administered on 6/8/17at 21:

51;  Start 6/8/17 at 21:30;  Stop 6/8/17 at 21:31;  Status DC


Insulin Detemir (Levemir) 10 units BID SQ  Last administered on 6/9/17at 20:54;

  Start 6/9/17 at 09:00


Diltiazem HCl (Cardizem 24hr Cd) 240 mg DAILY PO  Last administered on 6/10/

17at 09:04;  Start 6/9/17 at 09:00


Apixaban (Eliquis) 5 mg BID PO  Last administered on 6/10/17at 09:03;  Start 6/9 /17 at 10:00;  Stop 6/10/17 at 12:01;  Status DC


Info (Anti-Coagulation Monitoring By Pharmacy) 1 each PRN DAILY  PRN MC SEE 

COMMENTS Last administered on 6/10/17at 09:23;  Start 6/10/17 at 09:30;  Stop 6/

10/17 at 12:03;  Status DC





Active Scripts


Active


Pantoprazole Sodium 40 Mg Tablet.dr 40 Mg PO BIDAC


Reported


Stephanie Back & Body Caplet (Aspirin/Caffeine) 1 Each Tablet 2 Each PO BID


Zoloft (Sertraline Hcl) 50 Mg Tablet 1 Tab PO DAILY


Glipizide 5 Mg Tablet 0.5 Tab PO BID


Potassium Chloride 20 Meq Tablet.er 20 Meq PO BID


     last dose this am


     next dose with supper


Sotalol (Sotalol Hcl) 80 Mg Tablet 40 Mg PO BID


     last dose this am


     next dose tonight


Furosemide 80 Mg Tablet 80 Mg PO DAILY


     last dose this am


     next dose tomorrow


Atorvastatin Calcium 40 Mg Tablet 40 Mg PO HS


     last dose was last dose


     next dose tonight


Metformin Hcl 1,000 Mg Tablet 1,000 Mg PO BID


     last dose this am


     next dose with supper


Vitals/I & O





Vital Sign - Last 24 Hours








 6/9/17 6/9/17 6/9/17 6/9/17





 15:38 19:03 19:04 19:05


 


Temp 97.6 98.5  





 97.6 98.5  


 


Pulse 80  80 


 


Resp 18   


 


B/P (MAP) 127/84 (98) 124/71 (88) 124/71 


 


Pulse Ox 96 92  


 


O2 Delivery Room Air Room Air  Room Air


 


    





    





 6/9/17 6/9/17 6/9/17 6/10/17





 20:38 20:41 22:27 02:57


 


Temp   97.7 98.2





   97.7 98.2


 


Pulse 122  84 68


 


Resp   18 18


 


B/P (MAP) 119/78  116/72 (87) 136/82 (100)


 


Pulse Ox   93 94


 


O2 Delivery  Room Air Room Air Room Air


 


    





    





 6/10/17 6/10/17 6/10/17 6/10/17





 07:00 08:00 09:03 09:04


 


Pulse 87  87 87


 


Resp 18   


 


B/P (MAP) 147/90 (109)  147/90 147/90


 


Pulse Ox 96   


 


O2 Delivery Room Air Room Air  





 6/10/17 6/10/17  





 09:04 11:00  


 


Temp  97.1  





  97.1  


 


Pulse 87 72  


 


Resp  18  


 


B/P (MAP) 147/90 149/87 (107)  


 


Pulse Ox  95  


 


O2 Delivery  Room Air  














Intake and Output   


 


 6/9/17 6/9/17 6/10/17





 15:00 23:00 07:00


 


Intake Total   300 ml


 


Output Total  100 ml 


 


Balance  -100 ml 300 ml

















BRITTNEE AMAYA MD [place] 10, 2017 14:03

## 2017-06-10 NOTE — PDOC
Provider Note


Provider Note


was confused last pm w/ visual hallucinations, better now and oriented- exam 

nonfocal- will dc digoxin as no benefit, dc eliquis re fall risk > benefit











DAPHNEY NAIR MD [place] 10, 2017 12:03

## 2017-06-11 VITALS — SYSTOLIC BLOOD PRESSURE: 124 MMHG | DIASTOLIC BLOOD PRESSURE: 61 MMHG

## 2017-06-11 VITALS — SYSTOLIC BLOOD PRESSURE: 122 MMHG | DIASTOLIC BLOOD PRESSURE: 75 MMHG

## 2017-06-11 VITALS — SYSTOLIC BLOOD PRESSURE: 158 MMHG | DIASTOLIC BLOOD PRESSURE: 98 MMHG

## 2017-06-11 VITALS — DIASTOLIC BLOOD PRESSURE: 81 MMHG | SYSTOLIC BLOOD PRESSURE: 115 MMHG

## 2017-06-11 VITALS — DIASTOLIC BLOOD PRESSURE: 105 MMHG | SYSTOLIC BLOOD PRESSURE: 154 MMHG

## 2017-06-11 VITALS — DIASTOLIC BLOOD PRESSURE: 89 MMHG | SYSTOLIC BLOOD PRESSURE: 132 MMHG

## 2017-06-11 LAB
BACTERIA #/AREA URNS HPF: 0 /HPF
BILIRUB UR QL STRIP: NEGATIVE
GLUCOSE UR STRIP-MCNC: NEGATIVE MG/DL
NITRITE UR QL STRIP: NEGATIVE
PH UR STRIP: 5.5 [PH]
PROT UR STRIP-MCNC: 30 MG/DL
RBC #/AREA URNS HPF: 0 /HPF (ref 0–2)
SP GR UR STRIP: 1.01
SQUAMOUS #/AREA URNS LPF: (no result) /LPF
UROBILINOGEN UR-MCNC: 0.2 MG/DL
WBC #/AREA URNS HPF: (no result) /HPF (ref 0–4)
YEAST #/AREA URNS HPF: PRESENT /HPF

## 2017-06-11 RX ADMIN — OXYCODONE HYDROCHLORIDE AND ACETAMINOPHEN PRN TAB: 5; 325 TABLET ORAL at 22:42

## 2017-06-11 RX ADMIN — OXYCODONE HYDROCHLORIDE AND ACETAMINOPHEN PRN TAB: 5; 325 TABLET ORAL at 07:58

## 2017-06-11 RX ADMIN — METOPROLOL TARTRATE SCH MG: 50 TABLET, FILM COATED ORAL at 07:59

## 2017-06-11 RX ADMIN — INSULIN ASPART SCH UNITS: 100 INJECTION, SOLUTION INTRAVENOUS; SUBCUTANEOUS at 12:01

## 2017-06-11 RX ADMIN — OXYCODONE HYDROCHLORIDE AND ACETAMINOPHEN PRN TAB: 5; 325 TABLET ORAL at 15:45

## 2017-06-11 RX ADMIN — INSULIN ASPART SCH UNITS: 100 INJECTION, SOLUTION INTRAVENOUS; SUBCUTANEOUS at 08:00

## 2017-06-11 RX ADMIN — PANTOPRAZOLE SODIUM SCH MG: 40 TABLET, DELAYED RELEASE ORAL at 07:59

## 2017-06-11 RX ADMIN — INSULIN DETEMIR SCH UNITS: 100 INJECTION, SOLUTION SUBCUTANEOUS at 08:04

## 2017-06-11 RX ADMIN — INSULIN ASPART SCH UNITS: 100 INJECTION, SOLUTION INTRAVENOUS; SUBCUTANEOUS at 17:33

## 2017-06-11 RX ADMIN — SERTRALINE HYDROCHLORIDE SCH MG: 50 TABLET ORAL at 07:58

## 2017-06-11 NOTE — RAD
Indication headaches.



Noncontrast images of the head were obtained and are compared to an

examination one week earlier.



No acute or significant calvarial finding is seen. The visualized paranasal

sinuses appear unremarkable. There is no subdural or epidural hematoma. There

is underlying atrophy. An acute finding is not seen. There is no evidence of

hemorrhage. There has not been a significant change when compared to the

previous exam



IMPRESSION: No acute finding. No significant change













PQRS Compliance Statement:



One or more of the following individualized dose reduction techniques were

utilized for this examination:

1. Automated exposure control

2. Adjustment of the mA and/or kV according to patient size

3. Use of iterative reconstruction technique

## 2017-06-11 NOTE — PDOC
Provider Note


Provider Note


Covering for Dr. Leiva.


He continues to complain headaches.


Has not had any chest pain.


Heart rate is under control with a normal  blood pressure response.


Biopsy results are not yet available.


Apparently prednisone has not been effective.











JASBIR TREJO MD Jun 11, 2017 14:02

## 2017-06-11 NOTE — PDOC
Provider Note


Provider Note


vss, more alert- rate better w/ more dilt, was on sotalol at home , will wean 

off metoprolol and make sure no rebound tachycardia- TA bx available tomorrow, 

cont pred until then-   he still complains of HA , no focal signs, so will 

repeat ct head, rest of meds same











DAPHNEY NAIR MD Jun 11, 2017 11:36

## 2017-06-12 VITALS — SYSTOLIC BLOOD PRESSURE: 138 MMHG | DIASTOLIC BLOOD PRESSURE: 92 MMHG

## 2017-06-12 VITALS — DIASTOLIC BLOOD PRESSURE: 90 MMHG | SYSTOLIC BLOOD PRESSURE: 137 MMHG

## 2017-06-12 VITALS — SYSTOLIC BLOOD PRESSURE: 146 MMHG | DIASTOLIC BLOOD PRESSURE: 90 MMHG

## 2017-06-12 VITALS — SYSTOLIC BLOOD PRESSURE: 142 MMHG | DIASTOLIC BLOOD PRESSURE: 83 MMHG

## 2017-06-12 VITALS — DIASTOLIC BLOOD PRESSURE: 87 MMHG | SYSTOLIC BLOOD PRESSURE: 147 MMHG

## 2017-06-12 VITALS — DIASTOLIC BLOOD PRESSURE: 98 MMHG | SYSTOLIC BLOOD PRESSURE: 139 MMHG

## 2017-06-12 RX ADMIN — OXYCODONE HYDROCHLORIDE AND ACETAMINOPHEN PRN TAB: 5; 325 TABLET ORAL at 09:11

## 2017-06-12 RX ADMIN — ACETAMINOPHEN PRN MG: 325 TABLET, FILM COATED ORAL at 09:09

## 2017-06-12 RX ADMIN — SOTALOL HYDROCHLORIDE SCH MG: 80 TABLET ORAL at 21:13

## 2017-06-12 RX ADMIN — INSULIN ASPART SCH UNITS: 100 INJECTION, SOLUTION INTRAVENOUS; SUBCUTANEOUS at 08:00

## 2017-06-12 RX ADMIN — SOTALOL HYDROCHLORIDE SCH MG: 80 TABLET ORAL at 09:12

## 2017-06-12 RX ADMIN — INSULIN ASPART SCH UNITS: 100 INJECTION, SOLUTION INTRAVENOUS; SUBCUTANEOUS at 17:00

## 2017-06-12 RX ADMIN — INSULIN ASPART SCH UNITS: 100 INJECTION, SOLUTION INTRAVENOUS; SUBCUTANEOUS at 12:00

## 2017-06-12 RX ADMIN — PANTOPRAZOLE SODIUM SCH MG: 40 TABLET, DELAYED RELEASE ORAL at 09:09

## 2017-06-12 RX ADMIN — SERTRALINE HYDROCHLORIDE SCH MG: 50 TABLET ORAL at 09:10

## 2017-06-12 NOTE — PDOC2
NEUROLOGY CONSULT


Date of Admission


Date of Admission


DATE: 6/12/17 


TIME: 19:22





Reason for Consult


Reason for Consult:


IMPRESSION:


Worsening of headaches.


Chronic headache.


MS changes.


DM


AFib/flutter


Syncope.


Obesity.





RECOMMENDATIONS/PLAN:


Brain MRI w/wo contrast.


Lab: see orders.


Pain control


Treat medical and cardiac diseases.


OT/PT.





HISTORY OF THE PRESENT ILLNESS:


67-y-old  male patient was admitted due to cardiac diseases. he has Hx 

of chronic headaches but his headaches became worse now and he rated his 

headaches 10/10. His haedaches were described as sharp pain in his left side of 

head to right side of head per his description.





PAST MEDICAL HISTORY:  


AFib/flutter, CHF, diabetes, atherosclerotic heart disease, aortic 

insufficiency.





ALLERGIES: 


ALLERGIC TO PENICILLIN.





SOCIAL HISTORY:  


He is nonsmoker, nondrinker, does not use drugs.  He is retired


from the [employer].





FAMILY HISTORY:  


Noncontributory.





MEDICATIONS:


Refer to MAR





FAMILY HISTORY: 


Non contributory.





SOCIAL HISTORY: 


Denies smoking, drinking, and illicit drug use.  





REVIEW OF SYSTEMS:


Constitutional: No malnutrition, weight loss, cachexia.


Head: No traumatic brain or head injury.


Skin: No edema, or rash.


Ear: pain in left side ear area.


Eyes: No vision loss or color blindness.


Nose: No bleeding or purulent discharges.


Hearing: Hearing decrease.


Neck: No injury.


Cardiac:  AFib, syncope.


Pulmonary: No COPD.


GI: No GI ulcer, GI bleeding.


Urinary/genital: No dysuria, hematuria, incontinence.


Endocrinologic: Diabetes Mellitus, obesity.


Skeletomuscular: No muscular atrophy, deformity.


Neurological: see  HP.


Psychiatric: Denies drug use/abuse.


Otherwise, not pertinant14-point review of systems.





PHYSICAL EXAMINATION:   


General appearance is in subacute distress.  


HEENT:  Normocephalic and nontraumatic.  Eyes, nose, ears, and throat are 

unremarkable.  


Neck is supple. No lymphadenopathy. No crepitus. 


Cardiovascular:  S1, S2, regular rate and rhythm.  


Pulmonary:  Clear to auscultation bilaterally. 


Abdomen:  Bowel sounds are positive.  Abdomen is soft, nontender, and 

nondistended.    


Extremities:  No rash, lesions, or edema.  


No restriction of range of motion





NEUROLOGICAL  EXAMINATION:


Awake.


Oriented to time, place and person.


PERRL.


EOMI.


CN: no focal findings.


Muscle tone: within normal.


Muscle strength: 4+


DTR: 2


Plantar reflex: Neutral response bilaterally 


Gait: not examined in bed. 


Sensory exam: no abnormal findings.


No acute cerebellar signs elicited.


F-T-N test fine.





Current Medications


Current Medications





Current Medications


Aspirin (Children'S Aspirin) 324 mg 1X  ONCE PO  Last administered on 6/3/17at 

12:19;  Start 6/3/17 at 12:15;  Stop 6/3/17 at 12:16;  Status DC


Fentanyl Citrate (Fentanyl 2ml Vial) 25 mcg PRN Q15MIN  PRN IV PAIN GREATER 

THAN 3/10 Last administered on 6/3/17at 12:27;  Start 6/3/17 at 12:15;  Stop 6/3

/17 at 15:34;  Status DC


Nitroglycerin (Nitrostat) 0.4 mg PRN Q5MIN  PRN SL CHEST PAIN;  Start 6/3/17 at 

12:15


Amiodarone HCl 150 mg/Dextrose 103 ml @  618 mls/hr 1X  ONCE IV  Last 

administered on 6/3/17at 12:24;  Start 6/3/17 at 12:15;  Stop 6/3/17 at 12:24;  

Status DC


Amiodarone HCl 900 mg/Dextrose 518 ml @ 0 mls/hr CONT  PRN IV SEE I/O RECORD 

Last administered on 6/3/17at 12:34;  Start 6/3/17 at 12:15;  Stop 6/3/17 at 12:

35;  Status DC


Sodium Chloride 1,000 ml @  1,000 mls/hr 1X  ONCE IV  Last administered on 6/3/

17at 12:35;  Start 6/3/17 at 12:45;  Stop 6/3/17 at 13:44;  Status DC


Digoxin (Lanoxin) 500 mcg 1X  ONCE PO  Last administered on 6/3/17at 12:50;  

Start 6/3/17 at 13:00;  Stop 6/3/17 at 13:01;  Status DC


Sodium Chloride 500 ml @  500 mls/hr 1X  ONCE IV ;  Start 6/3/17 at 12:45;  

Stop 6/3/17 at 13:44;  Status DC


Ondansetron HCl (Zofran) 4 mg PRN Q8HRS  PRN IV NAUSEA/VOMITING;  Start 6/3/17 

at 15:30;  Stop 6/4/17 at 15:29;  Status DC


Fentanyl Citrate (Fentanyl 2ml Vial) 50 mcg PRN Q2HR  PRN IV PAIN Last 

administered on 6/4/17at 14:07;  Start 6/3/17 at 15:30;  Stop 6/4/17 at 15:29;  

Status DC


Acetaminophen (Tylenol) 650 mg PRN Q4HRS  PRN PO FEVER;  Start 6/3/17 at 15:30;

  Stop 6/4/17 at 15:29;  Status DC


Nitroglycerin (Nitrostat) 0.4 mg PRN Q5MIN  PRN SL CHEST PAIN;  Start 6/3/17 at 

15:30;  Stop 6/3/17 at 15:34;  Status DC


Insulin Aspart (NovoLOG) 0-5 UNITS TIDWMEALS SQ  Last administered on 6/11/17at 

17:33;  Start 6/3/17 at 17:00


Dextrose (Dextrose 50%-Water Syringe) 12.5 gm PRN Q15MIN  PRN IV SEE COMMENTS;  

Start 6/3/17 at 15:30


Metoprolol Tartrate (Lopressor) 2.5 mg Q6HRS IVP ;  Start 6/4/17 at 19:30;  

Stop 6/4/17 at 19:30;  Status DC


Enoxaparin Sodium (Lovenox 150mg Syringe) 130 mg 1X  ONCE SQ  Last administered 

on 6/3/17at 19:51;  Start 6/3/17 at 19:45;  Stop 6/3/17 at 19:48;  Status DC


Metoprolol Tartrate (Lopressor) 2.5 mg Q6HRS IVP  Last administered on 6/4/17at 

05:35;  Start 6/3/17 at 20:30;  Stop 6/4/17 at 12:53;  Status DC


Levothyroxine Sodium (Synthroid) 50 mcg DAILY07 PO ;  Start 6/4/17 at 13:30;  

Stop 6/4/17 at 14:26;  Status DC


Sodium Chloride 1,000 ml @  75 mls/hr [id number] IV  Last administered on 6/5/17at 

02:50;  Start 6/4/17 at 13:30;  Stop 6/5/17 at 19:05;  Status DC


Enoxaparin Sodium (Lovenox Per Pharmacy Treatment Dosing) 1 each PRN DAILY  PRN 

MC SEE COMMENTS;  Start 6/4/17 at 13:00;  Status Cancel


Metoprolol Tartrate (Lopressor) 5 mg Q6HRS IVP  Last administered on 6/5/17at 19

:00;  Start 6/4/17 at 13:30;  Stop 6/5/17 at 21:22;  Status DC


Enoxaparin Sodium (Lovenox 150mg Syringe) 130 mg BID SQ  Last administered on 6/ 4/17at 14:03;  Start 6/4/17 at 13:30;  Stop 6/5/17 at 19:54;  Status DC


Sertraline HCl (Zoloft) 50 mg DAILY PO  Last administered on 6/12/17at 09:10;  

Start 6/4/17 at 14:30


Non-Formulary Medication 2 each BID PO ;  Start 6/4/17 at 21:00;  Status UNV


Info (Anti-Coagulation Monitoring By Pharmacy) 1 each PRN DAILY  PRN MC SEE 

COMMENTS Last administered on 6/5/17at 08:20;  Start 6/5/17 at 08:15;  Stop 6/6/ 17 at 16:21;  Status DC


Lidocaine HCl 20 ml STK-MED ONCE .ROUTE ;  Start 6/5/17 at 13:40;  Stop 6/5/17 

at 13:41;  Status DC


Heparin Sodium/ Sodium Chloride 1,000 ml @  As Directed STK-MED ONCE .ROUTE ;  

Start 6/5/17 at 13:40;  Stop 6/5/17 at 13:41;  Status DC


Iodixanol (Visipaque 320) 100 ml STK-MED ONCE .ROUTE ;  Start 6/5/17 at 13:40;  

Stop 6/5/17 at 13:41;  Status DC


Midazolam HCl (Versed) 2 mg STK-MED ONCE .ROUTE ;  Start 6/5/17 at 14:02;  Stop 

6/5/17 at 14:03;  Status DC


Fentanyl Citrate (Fentanyl 2ml Vial) 100 mcg STK-MED ONCE .ROUTE ;  Start 6/5/ 17 at 14:03;  Stop 6/5/17 at 14:04;  Status DC


Heparin Sodium/ Sodium Chloride 500 ml @  As Directed STK-MED ONCE .ROUTE ;  

Start 6/5/17 at 14:32;  Stop 6/5/17 at 14:33;  Status DC


Nitroglycerin/ Dextrose 250 ml @  As Directed STK-MED ONCE IV ;  Start 6/5/17 

at 14:39;  Stop 6/5/17 at 14:40;  Status DC


Adenosine (Adenoscan) 90 mg STK-MED ONCE IV ;  Start 6/5/17 at 14:49;  Stop 6/5/ 17 at 14:50;  Status DC


Hydralazine HCl (Apresoline) 20 mg STK-MED ONCE .ROUTE ;  Start 6/5/17 at 15:06

;  Stop 6/5/17 at 15:07;  Status DC


Heparin Sodium (Porcine) (Heparin Sodium) 10,000 unit STK-MED ONCE .ROUTE ;  

Start 6/5/17 at 15:17;  Stop 6/5/17 at 15:18;  Status DC


Heparin Sodium/ Sodium Chloride 1,000 unit 1X  ONCE IART  Last administered on 6 /5/17at 15:28;  Start 6/5/17 at 15:30;  Stop 6/5/17 at 15:31;  Status DC


Midazolam HCl (Versed) 2 mg 1X  ONCE IV ;  Start 6/5/17 at 15:30;  Stop 6/5/17 

at 15:31;  Status DC


Fentanyl Citrate (Fentanyl 2ml Vial) 100 mcg 1X  ONCE IV ;  Start 6/5/17 at 15:

30;  Stop 6/5/17 at 15:31;  Status DC


Iodixanol (Visipaque 320) 100 ml 1X  ONCE IART  Last administered on 6/5/17at 15

:28;  Start 6/5/17 at 15:30;  Stop 6/5/17 at 15:31;  Status DC


Heparin Sodium (Porcine) (Heparin Sodium) 4,000 unit 1X  ONCE IV  Last 

administered on 6/5/17at 15:32;  Start 6/5/17 at 15:30;  Stop 6/5/17 at 15:31;  

Status DC


Adenosine 90 mg/ Sodium Chloride 120 ml @  200 mls/hr 1X  ONCE IV  Last 

administered on 6/5/17at 15:29;  Start 6/5/17 at 15:30;  Stop 6/5/17 at 16:05;  

Status DC


Hydralazine HCl (Apresoline) 5 mg 1X  ONCE IVP  Last administered on 6/5/17at 15

:30;  Start 6/5/17 at 15:30;  Stop 6/5/17 at 15:31;  Status DC


Info (Do NOT chart on this entry -- for MONITORING) 1 each PRN DAILY  PRN MC 

SEE COMMENTS;  Start 6/5/17 at 15:30;  Stop 6/7/17 at 15:29;  Status DC


Amlodipine Besylate (Norvasc) 5 mg DAILY PO  Last administered on 6/6/17at 08:30

;  Start 6/6/17 at 09:00;  Stop 6/6/17 at 23:22;  Status DC


Amlodipine Besylate (Norvasc) 5 mg 1X  ONCE PO  Last administered on 6/5/17at 21

:36;  Start 6/5/17 at 21:15;  Stop 6/5/17 at 21:16;  Status DC


Metoprolol Tartrate (Lopressor) 50 mg Q8HRS PO  Last administered on 6/6/17at 21

:39;  Start 6/5/17 at 22:00;  Stop 6/6/17 at 23:22;  Status DC


Acetaminophen (Tylenol) 650 mg PRN QID  PRN PO PAIN Last administered on 6/12/ 17at 09:09;  Start 6/5/17 at 22:00


Pantoprazole Sodium (Protonix) 40 mg DAILYAC PO  Last administered on 6/12/17at 

09:09;  Start 6/6/17 at 11:30


Digoxin (Lanoxin) 500 mcg 1X  ONCE PO  Last administered on 6/6/17at 09:28;  

Start 6/6/17 at 08:45;  Stop 6/6/17 at 08:55;  Status DC


Digoxin (Lanoxin) 250 mcg DAILY PO  Last administered on 6/10/17at 09:03;  

Start 6/6/17 at 16:00;  Stop 6/10/17 at 12:02;  Status DC


Metoprolol Tartrate (Lopressor) 50 mg BID PO  Last administered on 6/11/17at 07:

59;  Start 6/7/17 at 09:00;  Stop 6/11/17 at 11:29;  Status DC


Diltiazem HCl (Cardizem 24hr Cd) 180 mg DAILY PO  Last administered on 6/9/17at 

08:40;  Start 6/7/17 at 09:00;  Stop 6/9/17 at 08:58;  Status DC


Prednisone (Prednisone) 40 mg DAILY PO  Last administered on 6/12/17at 09:12;  

Start 6/7/17 at 09:30


Metformin HCl (Glucophage) 1,000 mg BIDWMEALS PO  Last administered on 6/12/ 17at 18:47;  Start 6/7/17 at 17:00


Levofloxacin/ Dextrose 100 ml @  100 mls/hr 1X PREOP  PRN IV prophylaxis Last 

administered on 6/8/17at 08:44;  Start 6/8/17 at 06:00;  Stop 6/8/17 at 18:00;  

Status DC


Fentanyl Citrate (Fentanyl 2ml Vial) 25 mcg PRN Q5MIN  PRN IV MILD PAIN;  Start 

6/8/17 at 07:00;  Stop 6/9/17 at 06:59;  Status DC


Fentanyl Citrate (Fentanyl 2ml Vial) 50 mcg PRN Q5MIN  PRN IV MODERATE PAIN;  

Start 6/8/17 at 07:00;  Stop 6/9/17 at 06:59;  Status DC


Morphine Sulfate 1 mg PRN Q10MIN  PRN IV SEVERE PAIN;  Start 6/8/17 at 07:00;  

Stop 6/9/17 at 06:59;  Status DC


Ringer's Solution 1,000 ml @  0 mls/hr Q0M IV ;  Start 6/8/17 at 07:00;  Stop 6/ 8/17 at 18:59;  Status DC


Lidocaine HCl 2 ml PRN 1X  PRN ID PRIOR TO IV START;  Start 6/8/17 at 07:00;  

Stop 6/9/17 at 06:59;  Status DC


Hydromorphone HCl (Dilaudid) 0.5 mg PRN Q10MIN  PRN IV SEV PAIN, Second choice;

  Start 6/8/17 at 07:00;  Stop 6/9/17 at 06:59;  Status DC


Prochlorperazine Edisylate (Compazine) 5 mg PACU PRN  PRN IV NAUSEA, MRX1;  

Start 6/8/17 at 07:00;  Stop 6/9/17 at 06:59;  Status DC


Bupivacaine HCl/ Epinephrine Bitart (Sensorcain-Mpf Epi 0.5%-1:[id number]) 30 ml STK

-MED ONCE .ROUTE ;  Start 6/8/17 at 07:38;  Stop 6/8/17 at 07:39;  Status DC


Bupivacaine HCl (Sensorcaine Mpf 0.5%) 30 ml STK-MED ONCE .ROUTE  Last 

administered on 6/8/17at 09:10;  Start 6/8/17 at 07:38;  Stop 6/8/17 at 07:39;  

Status DC


Fentanyl Citrate (Fentanyl 2ml Vial) 100 mcg STK-MED ONCE .ROUTE ;  Start 6/8/ 17 at 08:21;  Stop 6/8/17 at 08:22;  Status DC


Neomycin/ Polymyxin/ Bacitracin (Triple Antibiotic Ointment) 1 pkt STK-MED ONCE 

TP ;  Start 6/8/17 at 08:46;  Stop 6/8/17 at 08:47;  Status DC


Dexamethasone Sodium Phosphate (Decadron) 20 mg STK-MED ONCE .ROUTE ;  Start 6/8 /17 at 09:08;  Stop 6/8/17 at 09:09;  Status DC


Ondansetron HCl (Zofran) 4 mg STK-MED ONCE .ROUTE ;  Start 6/8/17 at 09:08;  

Stop 6/8/17 at 09:09;  Status DC


Propofol 20 ml @ As Directed STK-MED ONCE IV ;  Start 6/8/17 at 09:08;  Stop 6/8 /17 at 09:09;  Status DC


Lidocaine HCl (Lidocaine Pf 2% Vial) 5 ml STK-MED ONCE .ROUTE ;  Start 6/8/17 

at 09:08;  Stop 6/8/17 at 09:09;  Status DC


Sevoflurane (Ultane) 30 ml STK-MED ONCE IH ;  Start 6/8/17 at 09:08;  Stop 6/8/ 17 at 09:09;  Status DC


Phenylephrine HCl 1 mg STK-MED ONCE IV ;  Start 6/8/17 at 09:08;  Stop 6/8/17 

at 09:09;  Status DC


Oxycodone/ Acetaminophen (Percocet 5/325) 1 tab PRN Q6HRS  PRN PO PAIN Last 

administered on 6/12/17at 09:11;  Start 6/8/17 at 16:00


Insulin Aspart (NovoLOG) 2 units 1X  ONCE SQ  Last administered on 6/8/17at 21:

51;  Start 6/8/17 at 21:30;  Stop 6/8/17 at 21:31;  Status DC


Insulin Detemir (Levemir) 10 units BID SQ  Last administered on 6/11/17at 08:04

;  Start 6/9/17 at 09:00;  Stop 6/11/17 at 22:32;  Status DC


Diltiazem HCl (Cardizem 24hr Cd) 240 mg DAILY PO  Last administered on 6/12/ 17at 09:11;  Start 6/9/17 at 09:00


Apixaban (Eliquis) 5 mg BID PO  Last administered on 6/10/17at 09:03;  Start 6/9 /17 at 10:00;  Stop 6/10/17 at 12:01;  Status DC


Info (Anti-Coagulation Monitoring By Pharmacy) 1 each PRN DAILY  PRN MC SEE 

COMMENTS Last administered on 6/10/17at 09:23;  Start 6/10/17 at 09:30;  Stop 6/

10/17 at 12:03;  Status DC


Metoprolol Tartrate (Lopressor) 25 mg BID PO  Last administered on 6/11/17at 21:

26;  Start 6/11/17 at 21:00;  Stop 6/12/17 at 08:39;  Status DC


Insulin Detemir (Levemir) 6 units BID SQ ;  Start 6/12/17 at 09:00;  Stop 6/12/ 17 at 09:00;  Status DC


Insulin Detemir (Levemir) 6 units BID SQ  Last administered on 6/11/17at 22:45;

  Start 6/11/17 at 23:00;  Stop 6/12/17 at 08:39;  Status DC


Sotalol HCl (Betapace) 40 mg BID PO  Last administered on 6/12/17at 09:12;  

Start 6/12/17 at 09:00


Glipizide (Glucotrol) 2.5 mg BID PO  Last administered on 6/12/17at 09:10;  

Start 6/12/17 at 09:00





Active Scripts


Active


Pantoprazole Sodium 40 Mg Tablet.dr 40 Mg PO BIDAC


Reported


Stephanie Back & Body Caplet (Aspirin/Caffeine) 1 Each Tablet 2 Each PO BID


Zoloft (Sertraline Hcl) 50 Mg Tablet 1 Tab PO DAILY


Glipizide 5 Mg Tablet 0.5 Tab PO BID


Potassium Chloride 20 Meq Tablet.er 20 Meq PO BID


     last dose this am


     next dose with supper


Sotalol (Sotalol Hcl) 80 Mg Tablet 40 Mg PO BID


     last dose this am


     next dose tonight


Furosemide 80 Mg Tablet 80 Mg PO DAILY


     last dose this am


     next dose tomorrow


Atorvastatin Calcium 40 Mg Tablet 40 Mg PO HS


     last dose was last dose


     next dose tonight


Metformin Hcl 1,000 Mg Tablet 1,000 Mg PO BID


     last dose this am


     next dose with supper





Allergies


Allergies:  


Coded Allergies:  


     Penicillins (Verified  Allergy, Severe, [facility], 6/8/17)





Vitals


VITALS





Vital Signs








  Date Time  Temp Pulse Resp B/P (MAP) Pulse Ox O2 Delivery O2 Flow Rate FiO2


 


6/12/17 15:40 97.5 117 22 139/98 (112) 96 Room Air  





 97.5       











Labs


Labs





Laboratory Tests








Test


  6/10/17


20:31 6/10/17


21:45 6/11/17


06:57 6/11/17


11:08


 


Glucose (Fingerstick)


  216 mg/dL


(70-99) 


  111 mg/dL


(70-99) 172 mg/dL


(70-99)


 


Urine Collection Type  Unknown   


 


Urine Color  Yellow   


 


Urine Clarity  Clear   


 


Urine pH  5.5   


 


Urine Specific Gravity  1.015   


 


Urine Protein


  


  30 mg/dL


(NEG-TRACE) 


  


 


 


Urine Glucose (UA)


  


  Negative mg/dL


(NEG) 


  


 


 


Urine Ketones (Stick)


  


  Negative mg/dL


(NEG) 


  


 


 


Urine Blood  Negative (NEG)   


 


Urine Nitrite  Negative (NEG)   


 


Urine Bilirubin  Negative (NEG)   


 


Urine Urobilinogen Dipstick


  


  0.2 mg/dL (0.2


mg/dL) 


  


 


 


Urine Leukocyte Esterase  Negative (NEG)   


 


Urine RBC  0 /HPF (0-2)   


 


Urine WBC  1-4 /HPF (0-4)   


 


Urine Squamous Epithelial


Cells 


  Few /LPF 


  


  


 


 


Urine Amorphous Sediment  Present /HPF   


 


Urine Bacteria  0 /HPF (0-FEW)   


 


Urine Mucus  Mod /LPF   


 


Urine Yeast  Present /HPF   


 


Test


  6/11/17


17:12 6/11/17


20:43 6/12/17


07:58 


 


 


Glucose (Fingerstick)


  271 mg/dL


(70-99) 192 mg/dL


(70-99) 91 mg/dL


(70-99) 


 








Laboratory Tests








Test


  6/11/17


20:43 6/12/17


07:58


 


Glucose (Fingerstick)


  192 mg/dL


(70-99) 91 mg/dL


(70-99)

















LEO WESTON MD Jun 12, 2017 19:32

## 2017-06-12 NOTE — PDOC
PROGRESS NOTES


Subjective


Subjective


The patient is less confused but still having confusion.





The temporal artery biopsy was negative but distant does not completely rule 

out the possibility of a temporal arteritis seems it could have skip lesions





Objective


Objective





Vital Signs








  Date Time  Temp Pulse Resp B/P (MAP) Pulse Ox O2 Delivery O2 Flow Rate FiO2


 


6/12/17 11:06 97.6 103 24 138/92 (107) 95 Room Air  





 97.6       


 


6/8/17 09:31       10 














Intake and Output 


 


 6/12/17





 07:00


 


Intake Total 2480 ml


 


Output Total 0 ml


 


Balance 2480 ml


 


 


 


Intake Oral 2480 ml


 


Output Urine Total 0 ml


 


# Voids 5











Physical Exam


Physical Exam


No significant changes in cardiac exam





Assessment


Assessment


A patient's biopsy was negative but he continues to have confusion. It could 

still be a temporal arteritis. I would like to get a neurology consult.





After he is evaluated by neurology consider getting a CV surgery consult since 

the patient and family are willing to have surgery as a consideration.





To me he would be a very poor surgical risk.





Comment


Review of Relevant


I have reviewed the following items nirmal (where applicable) has been applied.


Labs





Laboratory Tests








Test


  6/10/17


11:54 6/10/17


14:42 6/10/17


16:29 6/10/17


17:13


 


Glucose (Fingerstick)


  117 mg/dL


(70-99) 219 mg/dL


(70-99) 276 mg/dL


(70-99) 283 mg/dL


(70-99)


 


Test


  6/10/17


20:31 6/10/17


21:45 6/11/17


06:57 6/11/17


11:08


 


Glucose (Fingerstick)


  216 mg/dL


(70-99) 


  111 mg/dL


(70-99) 172 mg/dL


(70-99)


 


Urine Collection Type  Unknown   


 


Urine Color  Yellow   


 


Urine Clarity  Clear   


 


Urine pH  5.5   


 


Urine Specific Gravity  1.015   


 


Urine Protein


  


  30 mg/dL


(NEG-TRACE) 


  


 


 


Urine Glucose (UA)


  


  Negative mg/dL


(NEG) 


  


 


 


Urine Ketones (Stick)


  


  Negative mg/dL


(NEG) 


  


 


 


Urine Blood  Negative (NEG)   


 


Urine Nitrite  Negative (NEG)   


 


Urine Bilirubin  Negative (NEG)   


 


Urine Urobilinogen Dipstick


  


  0.2 mg/dL (0.2


mg/dL) 


  


 


 


Urine Leukocyte Esterase  Negative (NEG)   


 


Urine RBC  0 /HPF (0-2)   


 


Urine WBC  1-4 /HPF (0-4)   


 


Urine Squamous Epithelial


Cells 


  Few /LPF 


  


  


 


 


Urine Amorphous Sediment  Present /HPF   


 


Urine Bacteria  0 /HPF (0-FEW)   


 


Urine Mucus  Mod /LPF   


 


Urine Yeast  Present /HPF   


 


Test


  6/11/17


17:12 6/11/17


20:43 6/12/17


07:58 


 


 


Glucose (Fingerstick)


  271 mg/dL


(70-99) 192 mg/dL


(70-99) 91 mg/dL


(70-99) 


 








Laboratory Tests








Test


  6/11/17


17:12 6/11/17


20:43 6/12/17


07:58


 


Glucose (Fingerstick)


  271 mg/dL


(70-99) 192 mg/dL


(70-99) 91 mg/dL


(70-99)








Medications





Current Medications


Aspirin (Children'S Aspirin) 324 mg 1X  ONCE PO  Last administered on 6/3/17at 

12:19;  Start 6/3/17 at 12:15;  Stop 6/3/17 at 12:16;  Status DC


Fentanyl Citrate (Fentanyl 2ml Vial) 25 mcg PRN Q15MIN  PRN IV PAIN GREATER 

THAN 3/10 Last administered on 6/3/17at 12:27;  Start 6/3/17 at 12:15;  Stop 6/3

/17 at 15:34;  Status DC


Nitroglycerin (Nitrostat) 0.4 mg PRN Q5MIN  PRN SL CHEST PAIN;  Start 6/3/17 at 

12:15


Amiodarone HCl 150 mg/Dextrose 103 ml @  618 mls/hr 1X  ONCE IV  Last 

administered on 6/3/17at 12:24;  Start 6/3/17 at 12:15;  Stop 6/3/17 at 12:24;  

Status DC


Amiodarone HCl 900 mg/Dextrose 518 ml @ 0 mls/hr CONT  PRN IV SEE I/O RECORD 

Last administered on 6/3/17at 12:34;  Start 6/3/17 at 12:15;  Stop 6/3/17 at 12:

35;  Status DC


Sodium Chloride 1,000 ml @  1,000 mls/hr 1X  ONCE IV  Last administered on 6/3/

17at 12:35;  Start 6/3/17 at 12:45;  Stop 6/3/17 at 13:44;  Status DC


Digoxin (Lanoxin) 500 mcg 1X  ONCE PO  Last administered on 6/3/17at 12:50;  

Start 6/3/17 at 13:00;  Stop 6/3/17 at 13:01;  Status DC


Sodium Chloride 500 ml @  500 mls/hr 1X  ONCE IV ;  Start 6/3/17 at 12:45;  

Stop 6/3/17 at 13:44;  Status DC


Ondansetron HCl (Zofran) 4 mg PRN Q8HRS  PRN IV NAUSEA/VOMITING;  Start 6/3/17 

at 15:30;  Stop 6/4/17 at 15:29;  Status DC


Fentanyl Citrate (Fentanyl 2ml Vial) 50 mcg PRN Q2HR  PRN IV PAIN Last 

administered on 6/4/17at 14:07;  Start 6/3/17 at 15:30;  Stop 6/4/17 at 15:29;  

Status DC


Acetaminophen (Tylenol) 650 mg PRN Q4HRS  PRN PO FEVER;  Start 6/3/17 at 15:30;

  Stop 6/4/17 at 15:29;  Status DC


Nitroglycerin (Nitrostat) 0.4 mg PRN Q5MIN  PRN SL CHEST PAIN;  Start 6/3/17 at 

15:30;  Stop 6/3/17 at 15:34;  Status DC


Insulin Aspart (NovoLOG) 0-5 UNITS TIDWMEALS SQ  Last administered on 6/11/17at 

17:33;  Start 6/3/17 at 17:00


Dextrose (Dextrose 50%-Water Syringe) 12.5 gm PRN Q15MIN  PRN IV SEE COMMENTS;  

Start 6/3/17 at 15:30


Metoprolol Tartrate (Lopressor) 2.5 mg Q6HRS IVP ;  Start 6/4/17 at 19:30;  

Stop 6/4/17 at 19:30;  Status DC


Enoxaparin Sodium (Lovenox 150mg Syringe) 130 mg 1X  ONCE SQ  Last administered 

on 6/3/17at 19:51;  Start 6/3/17 at 19:45;  Stop 6/3/17 at 19:48;  Status DC


Metoprolol Tartrate (Lopressor) 2.5 mg Q6HRS IVP  Last administered on 6/4/17at 

05:35;  Start 6/3/17 at 20:30;  Stop 6/4/17 at 12:53;  Status DC


Levothyroxine Sodium (Synthroid) 50 mcg DAILY07 PO ;  Start 6/4/17 at 13:30;  

Stop 6/4/17 at 14:26;  Status DC


Sodium Chloride 1,000 ml @  75 mls/hr [id number] IV  Last administered on 6/5/17at 

02:50;  Start 6/4/17 at 13:30;  Stop 6/5/17 at 19:05;  Status DC


Enoxaparin Sodium (Lovenox Per Pharmacy Treatment Dosing) 1 each PRN DAILY  PRN 

MC SEE COMMENTS;  Start 6/4/17 at 13:00;  Status Cancel


Metoprolol Tartrate (Lopressor) 5 mg Q6HRS IVP  Last administered on 6/5/17at 19

:00;  Start 6/4/17 at 13:30;  Stop 6/5/17 at 21:22;  Status DC


Enoxaparin Sodium (Lovenox 150mg Syringe) 130 mg BID SQ  Last administered on 6/ 4/17at 14:03;  Start 6/4/17 at 13:30;  Stop 6/5/17 at 19:54;  Status DC


Sertraline HCl (Zoloft) 50 mg DAILY PO  Last administered on 6/12/17at 09:10;  

Start 6/4/17 at 14:30


Non-Formulary Medication 2 each BID PO ;  Start 6/4/17 at 21:00;  Status UNV


Info (Anti-Coagulation Monitoring By Pharmacy) 1 each PRN DAILY  PRN MC SEE 

COMMENTS Last administered on 6/5/17at 08:20;  Start 6/5/17 at 08:15;  Stop 6/6/ 17 at 16:21;  Status DC


Lidocaine HCl 20 ml STK-MED ONCE .ROUTE ;  Start 6/5/17 at 13:40;  Stop 6/5/17 

at 13:41;  Status DC


Heparin Sodium/ Sodium Chloride 1,000 ml @  As Directed STK-MED ONCE .ROUTE ;  

Start 6/5/17 at 13:40;  Stop 6/5/17 at 13:41;  Status DC


Iodixanol (Visipaque 320) 100 ml STK-MED ONCE .ROUTE ;  Start 6/5/17 at 13:40;  

Stop 6/5/17 at 13:41;  Status DC


Midazolam HCl (Versed) 2 mg STK-MED ONCE .ROUTE ;  Start 6/5/17 at 14:02;  Stop 

6/5/17 at 14:03;  Status DC


Fentanyl Citrate (Fentanyl 2ml Vial) 100 mcg STK-MED ONCE .ROUTE ;  Start 6/5/ 17 at 14:03;  Stop 6/5/17 at 14:04;  Status DC


Heparin Sodium/ Sodium Chloride 500 ml @  As Directed STK-MED ONCE .ROUTE ;  

Start 6/5/17 at 14:32;  Stop 6/5/17 at 14:33;  Status DC


Nitroglycerin/ Dextrose 250 ml @  As Directed STK-MED ONCE IV ;  Start 6/5/17 

at 14:39;  Stop 6/5/17 at 14:40;  Status DC


Adenosine (Adenoscan) 90 mg STK-MED ONCE IV ;  Start 6/5/17 at 14:49;  Stop 6/5/ 17 at 14:50;  Status DC


Hydralazine HCl (Apresoline) 20 mg STK-MED ONCE .ROUTE ;  Start 6/5/17 at 15:06

;  Stop 6/5/17 at 15:07;  Status DC


Heparin Sodium (Porcine) (Heparin Sodium) 10,000 unit STK-MED ONCE .ROUTE ;  

Start 6/5/17 at 15:17;  Stop 6/5/17 at 15:18;  Status DC


Heparin Sodium/ Sodium Chloride 1,000 unit 1X  ONCE IART  Last administered on 6 /5/17at 15:28;  Start 6/5/17 at 15:30;  Stop 6/5/17 at 15:31;  Status DC


Midazolam HCl (Versed) 2 mg 1X  ONCE IV ;  Start 6/5/17 at 15:30;  Stop 6/5/17 

at 15:31;  Status DC


Fentanyl Citrate (Fentanyl 2ml Vial) 100 mcg 1X  ONCE IV ;  Start 6/5/17 at 15:

30;  Stop 6/5/17 at 15:31;  Status DC


Iodixanol (Visipaque 320) 100 ml 1X  ONCE IART  Last administered on 6/5/17at 15

:28;  Start 6/5/17 at 15:30;  Stop 6/5/17 at 15:31;  Status DC


Heparin Sodium (Porcine) (Heparin Sodium) 4,000 unit 1X  ONCE IV  Last 

administered on 6/5/17at 15:32;  Start 6/5/17 at 15:30;  Stop 6/5/17 at 15:31;  

Status DC


Adenosine 90 mg/ Sodium Chloride 120 ml @  200 mls/hr 1X  ONCE IV  Last 

administered on 6/5/17at 15:29;  Start 6/5/17 at 15:30;  Stop 6/5/17 at 16:05;  

Status DC


Hydralazine HCl (Apresoline) 5 mg 1X  ONCE IVP  Last administered on 6/5/17at 15

:30;  Start 6/5/17 at 15:30;  Stop 6/5/17 at 15:31;  Status DC


Info (Do NOT chart on this entry -- for MONITORING) 1 each PRN DAILY  PRN MC 

SEE COMMENTS;  Start 6/5/17 at 15:30;  Stop 6/7/17 at 15:29;  Status DC


Amlodipine Besylate (Norvasc) 5 mg DAILY PO  Last administered on 6/6/17at 08:30

;  Start 6/6/17 at 09:00;  Stop 6/6/17 at 23:22;  Status DC


Amlodipine Besylate (Norvasc) 5 mg 1X  ONCE PO  Last administered on 6/5/17at 21

:36;  Start 6/5/17 at 21:15;  Stop 6/5/17 at 21:16;  Status DC


Metoprolol Tartrate (Lopressor) 50 mg Q8HRS PO  Last administered on 6/6/17at 21

:39;  Start 6/5/17 at 22:00;  Stop 6/6/17 at 23:22;  Status DC


Acetaminophen (Tylenol) 650 mg PRN QID  PRN PO PAIN Last administered on 6/12/ 17at 09:09;  Start 6/5/17 at 22:00


Pantoprazole Sodium (Protonix) 40 mg DAILYAC PO  Last administered on 6/12/17at 

09:09;  Start 6/6/17 at 11:30


Digoxin (Lanoxin) 500 mcg 1X  ONCE PO  Last administered on 6/6/17at 09:28;  

Start 6/6/17 at 08:45;  Stop 6/6/17 at 08:55;  Status DC


Digoxin (Lanoxin) 250 mcg DAILY PO  Last administered on 6/10/17at 09:03;  

Start 6/6/17 at 16:00;  Stop 6/10/17 at 12:02;  Status DC


Metoprolol Tartrate (Lopressor) 50 mg BID PO  Last administered on 6/11/17at 07:

59;  Start 6/7/17 at 09:00;  Stop 6/11/17 at 11:29;  Status DC


Diltiazem HCl (Cardizem 24hr Cd) 180 mg DAILY PO  Last administered on 6/9/17at 

08:40;  Start 6/7/17 at 09:00;  Stop 6/9/17 at 08:58;  Status DC


Prednisone (Prednisone) 40 mg DAILY PO  Last administered on 6/12/17at 09:12;  

Start 6/7/17 at 09:30


Metformin HCl (Glucophage) 1,000 mg BIDWMEALS PO  Last administered on 6/12/ 17at 09:10;  Start 6/7/17 at 17:00


Levofloxacin/ Dextrose 100 ml @  100 mls/hr 1X PREOP  PRN IV prophylaxis Last 

administered on 6/8/17at 08:44;  Start 6/8/17 at 06:00;  Stop 6/8/17 at 18:00;  

Status DC


Fentanyl Citrate (Fentanyl 2ml Vial) 25 mcg PRN Q5MIN  PRN IV MILD PAIN;  Start 

6/8/17 at 07:00;  Stop 6/9/17 at 06:59;  Status DC


Fentanyl Citrate (Fentanyl 2ml Vial) 50 mcg PRN Q5MIN  PRN IV MODERATE PAIN;  

Start 6/8/17 at 07:00;  Stop 6/9/17 at 06:59;  Status DC


Morphine Sulfate 1 mg PRN Q10MIN  PRN IV SEVERE PAIN;  Start 6/8/17 at 07:00;  

Stop 6/9/17 at 06:59;  Status DC


Ringer's Solution 1,000 ml @  0 mls/hr Q0M IV ;  Start 6/8/17 at 07:00;  Stop 6/ 8/17 at 18:59;  Status DC


Lidocaine HCl 2 ml PRN 1X  PRN ID PRIOR TO IV START;  Start 6/8/17 at 07:00;  

Stop 6/9/17 at 06:59;  Status DC


Hydromorphone HCl (Dilaudid) 0.5 mg PRN Q10MIN  PRN IV SEV PAIN, Second choice;

  Start 6/8/17 at 07:00;  Stop 6/9/17 at 06:59;  Status DC


Prochlorperazine Edisylate (Compazine) 5 mg PACU PRN  PRN IV NAUSEA, MRX1;  

Start 6/8/17 at 07:00;  Stop 6/9/17 at 06:59;  Status DC


Bupivacaine HCl/ Epinephrine Bitart (Sensorcain-Mpf Epi 0.5%-1:[id number]) 30 ml STK

-MED ONCE .ROUTE ;  Start 6/8/17 at 07:38;  Stop 6/8/17 at 07:39;  Status DC


Bupivacaine HCl (Sensorcaine Mpf 0.5%) 30 ml STK-MED ONCE .ROUTE  Last 

administered on 6/8/17at 09:10;  Start 6/8/17 at 07:38;  Stop 6/8/17 at 07:39;  

Status DC


Fentanyl Citrate (Fentanyl 2ml Vial) 100 mcg STK-MED ONCE .ROUTE ;  Start 6/8/ 17 at 08:21;  Stop 6/8/17 at 08:22;  Status DC


Neomycin/ Polymyxin/ Bacitracin (Triple Antibiotic Ointment) 1 pkt STK-MED ONCE 

TP ;  Start 6/8/17 at 08:46;  Stop 6/8/17 at 08:47;  Status DC


Dexamethasone Sodium Phosphate (Decadron) 20 mg STK-MED ONCE .ROUTE ;  Start 6/8 /17 at 09:08;  Stop 6/8/17 at 09:09;  Status DC


Ondansetron HCl (Zofran) 4 mg STK-MED ONCE .ROUTE ;  Start 6/8/17 at 09:08;  

Stop 6/8/17 at 09:09;  Status DC


Propofol 20 ml @ As Directed STK-MED ONCE IV ;  Start 6/8/17 at 09:08;  Stop 6/8 /17 at 09:09;  Status DC


Lidocaine HCl (Lidocaine Pf 2% Vial) 5 ml STK-MED ONCE .ROUTE ;  Start 6/8/17 

at 09:08;  Stop 6/8/17 at 09:09;  Status DC


Sevoflurane (Ultane) 30 ml STK-MED ONCE IH ;  Start 6/8/17 at 09:08;  Stop 6/8/ 17 at 09:09;  Status DC


Phenylephrine HCl 1 mg STK-MED ONCE IV ;  Start 6/8/17 at 09:08;  Stop 6/8/17 

at 09:09;  Status DC


Oxycodone/ Acetaminophen (Percocet 5/325) 1 tab PRN Q6HRS  PRN PO PAIN Last 

administered on 6/12/17at 09:11;  Start 6/8/17 at 16:00


Insulin Aspart (NovoLOG) 2 units 1X  ONCE SQ  Last administered on 6/8/17at 21:

51;  Start 6/8/17 at 21:30;  Stop 6/8/17 at 21:31;  Status DC


Insulin Detemir (Levemir) 10 units BID SQ  Last administered on 6/11/17at 08:04

;  Start 6/9/17 at 09:00;  Stop 6/11/17 at 22:32;  Status DC


Diltiazem HCl (Cardizem 24hr Cd) 240 mg DAILY PO  Last administered on 6/12/ 17at 09:11;  Start 6/9/17 at 09:00


Apixaban (Eliquis) 5 mg BID PO  Last administered on 6/10/17at 09:03;  Start 6/9 /17 at 10:00;  Stop 6/10/17 at 12:01;  Status DC


Info (Anti-Coagulation Monitoring By Pharmacy) 1 each PRN DAILY  PRN MC SEE 

COMMENTS Last administered on 6/10/17at 09:23;  Start 6/10/17 at 09:30;  Stop 6/

10/17 at 12:03;  Status DC


Metoprolol Tartrate (Lopressor) 25 mg BID PO  Last administered on 6/11/17at 21:

26;  Start 6/11/17 at 21:00;  Stop 6/12/17 at 08:39;  Status DC


Insulin Detemir (Levemir) 6 units BID SQ ;  Start 6/12/17 at 09:00;  Stop 6/12/ 17 at 09:00;  Status DC


Insulin Detemir (Levemir) 6 units BID SQ  Last administered on 6/11/17at 22:45;

  Start 6/11/17 at 23:00;  Stop 6/12/17 at 08:39;  Status DC


Sotalol HCl (Betapace) 40 mg BID PO  Last administered on 6/12/17at 09:12;  

Start 6/12/17 at 09:00


Glipizide (Glucotrol) 2.5 mg BID PO  Last administered on 6/12/17at 09:10;  

Start 6/12/17 at 09:00





Active Scripts


Active


Pantoprazole Sodium 40 Mg Tablet.dr 40 Mg PO BIDAC


Reported


Stephanie Back & Body Caplet (Aspirin/Caffeine) 1 Each Tablet 2 Each PO BID


Zoloft (Sertraline Hcl) 50 Mg Tablet 1 Tab PO DAILY


Glipizide 5 Mg Tablet 0.5 Tab PO BID


Potassium Chloride 20 Meq Tablet.er 20 Meq PO BID


     last dose this am


     next dose with supper


Sotalol (Sotalol Hcl) 80 Mg Tablet 40 Mg PO BID


     last dose this am


     next dose tonight


Furosemide 80 Mg Tablet 80 Mg PO DAILY


     last dose this am


     next dose tomorrow


Atorvastatin Calcium 40 Mg Tablet 40 Mg PO HS


     last dose was last dose


     next dose tonight


Metformin Hcl 1,000 Mg Tablet 1,000 Mg PO BID


     last dose this am


     next dose with supper


Vitals/I & O





Vital Sign - Last 24 Hours








 6/11/17 6/11/17 6/11/17 6/11/17





 15:00 15:45 19:05 19:35


 


Temp 98.1  99.0 





 98.1  99.0 


 


Pulse 90  99 


 


Resp 16  18 


 


B/P (MAP) 124/61 (82)  115/81 (92) 


 


Pulse Ox 96  94 


 


O2 Delivery Room Air Room Air Room Air Room Air


 


    





    





 6/11/17 6/11/17 6/11/17 6/12/17





 21:26 22:42 23:13 03:29


 


Temp   97.9 98.0





   97.9 98.0


 


Pulse 101  108 72


 


Resp   18 16


 


B/P (MAP) 115/81  154/105 (121) 146/90 (108)


 


Pulse Ox   92 93


 


O2 Delivery  Room Air Room Air Room Air


 


    





    





 6/12/17 6/12/17 6/12/17 6/12/17





 07:45 09:11 09:11 09:12


 


Temp 97.7   





 97.7   


 


Pulse 97  97 97


 


Resp 17 18  


 


B/P (MAP) 147/87 (107)  147/87 147/87


 


Pulse Ox 94 94  


 


O2 Delivery Room Air Room Air  


 


    





    





 6/12/17 6/12/17  





 10:11 11:06  


 


Temp  97.6  





  97.6  


 


Pulse  103  


 


Resp 18 24  


 


B/P (MAP)  138/92 (107)  


 


Pulse Ox 94 95  


 


O2 Delivery Room Air Room Air  














Intake and Output   


 


 6/11/17 6/11/17 6/12/17





 15:00 23:00 07:00


 


Intake Total 490 ml 990 ml 1000 ml


 


Output Total   0 ml


 


Balance 490 ml 990 ml 1000 ml

















BRITTNEE AMAYA MD Jun 12, 2017 11:47

## 2017-06-12 NOTE — PATHOLOGY
PATHOLOGY REPORT 

 

*    *    *    *    *    *    *    * 

 FINAL DIAGNOSIS:

Left temporal artery biopsy:

- No significant pathologic abnormalities.  

 

COMMENT:

The left temporal artery biopsy is examined at multiple levels. 

There is no evidence of temporal arteritis.  

(JPM:mml; d/t: 2017) 

 

 

REPORT ELECTRONICALLY SIGNED BY:   Gurpreet Bruner M.D.

DATE/TIME:   2017 16:37

*    *    *    *    *    *    *    *

 

GROSS PATHOLOGY:

Received in formalin labeled "Nikolai Goodrich, left temporal

biopsy," and additionally labeled on the requisition as "left

temporal artery biopsy," is a 2.3 cm segment of whitish-gray tubular

soft tissue measuring 0.2 cm in diameter.  The tissue is submitted in

toto in cassette A1.

(KAH; 2017)

 

 

 INITIAL CPT CODE(S):

A; 01963

Professional services performed by [facility] [place] 

[place] [address]

 

  Technical services performed by [facility] at [address] [place] [address], [facility] [[email or web address]]

[address], [place] [address].

  

 SPECIMEN(S) RECEIVED:

A.Left temporal artery biopsy

 

 CLINICAL HISTORY:

Headaches 

 

 PATIENT:  NIKOLAI GOODRICH

/AGE:  1950 (Age: 67)  

PATIENT #:  [id number]

ACCESSION #:  [id number]          ALT CASE #:   

SPECIMEN COLLECTION DATE:  2017

SPECIMEN RECEIVED DATE:  2017

   

[facility] - [address] [place] [address] - PHONE: 

[phone]

* * *  END OF REPORT  * * *

## 2017-06-12 NOTE — PDOC
Provider Note


Provider Note


feels better but hr still [id number] and af - will resume home sotalol in place of 

metoprolol, ines dc w/ af/rvr- TA bx pending, resume home glipizide also











DAPHNEY NARI MD Jun 12, 2017 08:43

## 2017-06-13 VITALS — SYSTOLIC BLOOD PRESSURE: 148 MMHG | DIASTOLIC BLOOD PRESSURE: 103 MMHG

## 2017-06-13 VITALS — SYSTOLIC BLOOD PRESSURE: 121 MMHG | DIASTOLIC BLOOD PRESSURE: 73 MMHG

## 2017-06-13 VITALS — SYSTOLIC BLOOD PRESSURE: 130 MMHG | DIASTOLIC BLOOD PRESSURE: 84 MMHG

## 2017-06-13 VITALS — SYSTOLIC BLOOD PRESSURE: 133 MMHG | DIASTOLIC BLOOD PRESSURE: 98 MMHG

## 2017-06-13 VITALS — DIASTOLIC BLOOD PRESSURE: 78 MMHG | SYSTOLIC BLOOD PRESSURE: 116 MMHG

## 2017-06-13 RX ADMIN — SOTALOL HYDROCHLORIDE SCH MG: 80 TABLET ORAL at 09:27

## 2017-06-13 RX ADMIN — PANTOPRAZOLE SODIUM SCH MG: 40 TABLET, DELAYED RELEASE ORAL at 06:30

## 2017-06-13 RX ADMIN — SERTRALINE HYDROCHLORIDE SCH MG: 50 TABLET ORAL at 09:26

## 2017-06-13 RX ADMIN — SOTALOL HYDROCHLORIDE SCH MG: 80 TABLET ORAL at 20:06

## 2017-06-13 RX ADMIN — ACETAMINOPHEN PRN MG: 325 TABLET, FILM COATED ORAL at 11:48

## 2017-06-13 RX ADMIN — OXYCODONE HYDROCHLORIDE AND ACETAMINOPHEN PRN TAB: 5; 325 TABLET ORAL at 06:31

## 2017-06-13 NOTE — PDOC
PROGRESS NOTES


Assessment


Assessment


Worsening of headaches.


Chronic headache.


MS changes.


DM


AFib/flutter


Syncope.


Obesity.


No evidence of acute CVA or brain tumor.





RECOMMENDATIONS/PLAN:


Pain control


Treat medical and cardiac diseases.


OT/PT.





Brain MRI w/wo contrast on 6/13/17: No acute findings.





HISTORY OF THE PRESENT ILLNESS:


67-y-old  male patient was admitted due to cardiac diseases. he has Hx 

of chronic headaches but his headaches became worse now and he rated his 

headaches 10/10. His haedaches were described as sharp pain in his left side of 

head to right side of head per his description.





PAST MEDICAL HISTORY:  


AFib/flutter, CHF, diabetes, atherosclerotic heart disease, aortic 

insufficiency.





ALLERGIES: 


ALLERGIC TO PENICILLIN.





SOCIAL HISTORY:  


He is nonsmoker, nondrinker, does not use drugs.  He is retired


from the [employer].





FAMILY HISTORY:  


Noncontributory.





MEDICATIONS:


Refer to MAR





FAMILY HISTORY: 


Non contributory.





SOCIAL HISTORY: 


Denies smoking, drinking, and illicit drug use.  





REVIEW OF SYSTEMS:


Constitutional: No malnutrition, weight loss, cachexia.


Head: No traumatic brain or head injury.


Skin: No edema, or rash.


Ear: pain in left side ear area.


Eyes: No vision loss or color blindness.


Nose: No bleeding or purulent discharges.


Hearing: Hearing decrease.


Neck: No injury.


Cardiac:  AFib, syncope.


Pulmonary: No COPD.


GI: No GI ulcer, GI bleeding.


Urinary/genital: No dysuria, hematuria, incontinence.


Endocrinologic: Diabetes Mellitus, obesity.


Skeletomuscular: No muscular atrophy, deformity.


Neurological: see  HP.


Psychiatric: Denies drug use/abuse.


Otherwise, not pertinant14-point review of systems.





PHYSICAL EXAMINATION:   


General appearance is in subacute distress.  


HEENT:  Normocephalic and nontraumatic.  Eyes, nose, ears, and throat are 

unremarkable.  


Neck is supple. No lymphadenopathy. No crepitus. 


Cardiovascular:  S1, S2, regular rate and rhythm.  


Pulmonary:  Clear to auscultation bilaterally. 


Abdomen:  Bowel sounds are positive.  Abdomen is soft, nontender, and 

nondistended.    


Extremities:  No rash, lesions, or edema.  


No restriction of range of motion





NEUROLOGICAL  EXAMINATION:


Sleepiness but arousable.


Oriented to time, place and person.


PERRL.


EOMI.


CN: no focal findings.


Muscle tone: within normal.


Muscle strength: 4+


DTR: 2


Plantar reflex: Neutral response bilaterally 


Gait: not examined in bed. 


Sensory exam: no abnormal findings.


No acute cerebellar signs elicited.


F-T-N test fine.





Objective


Objective





Vital Signs








  Date Time  Temp Pulse Resp B/P (MAP) Pulse Ox O2 Delivery O2 Flow Rate FiO2


 


6/13/17 11:06 97.6 85 20 130/84 (99)  Room Air  





 97.6       


 


6/13/17 07:57     94   














Intake and Output 


 


 6/13/17





 07:00


 


Intake Total 3270 ml


 


Output Total 1700 ml


 


Balance 1570 ml


 


 


 


Intake Oral 3270 ml


 


Output Urine Total 1700 ml


 


# Voids 5











Vitals Signs


Vitals





 VS - Last 72 Hours, by Label








  Date Time  Temp Pulse Resp B/P (MAP) Pulse Ox O2 Delivery O2 Flow Rate FiO2


 


6/13/17 11:06 97.6 85 20 130/84 (99)  Room Air  





 97.6       


 


6/13/17 09:27  98  148/103    


 


6/13/17 09:27  98  148/103    


 


6/13/17 07:57 98.1 90 20 148/103 (118) 94 Room Air  





 98.1       


 


6/13/17 06:31      Room Air  


 


6/13/17 03:44 97.3 87 20 133/98 (110) 93 Room Air  





 97.3       


 


6/12/17 22:24  124 20 142/83 (102) 95 Room Air  


 


6/12/17 21:13  124  137/90    


 


6/12/17 20:10      Room Air  


 


6/12/17 19:51     93   


 


6/12/17 19:49 98.2 124 20 137/90 (106) 91 Room Air  





 98.2       


 


6/12/17 15:40 97.5 117 22 139/98 (112) 96 Room Air  





 97.5       


 


6/12/17 11:06 97.6 103 24 138/92 (107) 95 Room Air  





 97.6       


 


6/12/17 10:11   18  94 Room Air  


 


6/12/17 09:12  97  147/87    


 


6/12/17 09:11  97  147/87    


 


6/12/17 09:11   18  94 Room Air  


 


6/12/17 08:00      Room Air  


 


6/12/17 07:45 97.7 97 17 147/87 (107) 94 Room Air  





 97.7       











Laboratory


Laboratory





Laboratory Tests








Test


  6/12/17


17:00 6/12/17


20:47 6/13/17


08:00 6/13/17


11:07


 


Glucose (Fingerstick)


  171 mg/dL


(70-99) 348 mg/dL


(70-99) 98 mg/dL


(70-99) 137 mg/dL


(70-99)











Medication


Medications





Current Medications


Gadobutrol (Gadavist) 10 mmol 1X  ONCE IV ;  Start 6/13/17 at 13:45;  Stop 6/13/ 17 at 13:46;  Status DC


Glipizide (Glucotrol) 2.5 mg BIDWMEALS PO ;  Start 6/13/17 at 17:00


Ibuprofen (Motrin) 400 mg PRN Q6HRS  PRN PO INFLAMMATION;  Start 6/13/17 at 08:

15





Comment


Review of Relevant


I have reviewed the following items nirmal (where applicable) has been applied.











LEO WESTON MD Jun 13, 2017 16:16

## 2017-06-13 NOTE — PDOC
PROGRESS NOTES


Subjective


Subjective


Patient is confused.





Neuro workup in progress





Objective


Objective





Vital Signs








  Date Time  Temp Pulse Resp B/P (MAP) Pulse Ox O2 Delivery O2 Flow Rate FiO2


 


6/13/17 11:06 97.6 85 20 130/84 (99)  Room Air  





 97.6       


 


6/13/17 07:57     94   


 


6/8/17 09:31       10 














Intake and Output 


 


 6/13/17





 07:00


 


Intake Total 3270 ml


 


Output Total 1700 ml


 


Balance 1570 ml


 


 


 


Intake Oral 3270 ml


 


Output Urine Total 1700 ml


 


# Voids 5











Physical Exam


Physical Exam


No significant changes in cardiac exam





Assessment


Assessment


Patient is still confused and complaining of headache intermittently.





Neuro workup in progress.





Agree with present plan.





Comment


Review of Relevant


I have reviewed the following items nirmal (where applicable) has been applied.


Labs





Laboratory Tests








Test


  6/11/17


20:43 6/12/17


07:58 6/12/17


11:43 6/12/17


17:00


 


Glucose (Fingerstick)


  192 mg/dL


(70-99) 91 mg/dL


(70-99) 138 mg/dL


(70-99) 171 mg/dL


(70-99)


 


Test


  6/12/17


20:47 6/13/17


08:00 6/13/17


11:07 6/13/17


17:05


 


Glucose (Fingerstick)


  348 mg/dL


(70-99) 98 mg/dL


(70-99) 137 mg/dL


(70-99) 115 mg/dL


(70-99)








Laboratory Tests








Test


  6/12/17


20:47 6/13/17


08:00 6/13/17


11:07 6/13/17


17:05


 


Glucose (Fingerstick)


  348 mg/dL


(70-99) 98 mg/dL


(70-99) 137 mg/dL


(70-99) 115 mg/dL


(70-99)








Medications





Current Medications


Aspirin (Children'S Aspirin) 324 mg 1X  ONCE PO  Last administered on 6/3/17at 

12:19;  Start 6/3/17 at 12:15;  Stop 6/3/17 at 12:16;  Status DC


Fentanyl Citrate (Fentanyl 2ml Vial) 25 mcg PRN Q15MIN  PRN IV PAIN GREATER 

THAN 3/10 Last administered on 6/3/17at 12:27;  Start 6/3/17 at 12:15;  Stop 6/3

/17 at 15:34;  Status DC


Nitroglycerin (Nitrostat) 0.4 mg PRN Q5MIN  PRN SL CHEST PAIN;  Start 6/3/17 at 

12:15


Amiodarone HCl 150 mg/Dextrose 103 ml @  618 mls/hr 1X  ONCE IV  Last 

administered on 6/3/17at 12:24;  Start 6/3/17 at 12:15;  Stop 6/3/17 at 12:24;  

Status DC


Amiodarone HCl 900 mg/Dextrose 518 ml @ 0 mls/hr CONT  PRN IV SEE I/O RECORD 

Last administered on 6/3/17at 12:34;  Start 6/3/17 at 12:15;  Stop 6/3/17 at 12:

35;  Status DC


Sodium Chloride 1,000 ml @  1,000 mls/hr 1X  ONCE IV  Last administered on 6/3/

17at 12:35;  Start 6/3/17 at 12:45;  Stop 6/3/17 at 13:44;  Status DC


Digoxin (Lanoxin) 500 mcg 1X  ONCE PO  Last administered on 6/3/17at 12:50;  

Start 6/3/17 at 13:00;  Stop 6/3/17 at 13:01;  Status DC


Sodium Chloride 500 ml @  500 mls/hr 1X  ONCE IV ;  Start 6/3/17 at 12:45;  

Stop 6/3/17 at 13:44;  Status DC


Ondansetron HCl (Zofran) 4 mg PRN Q8HRS  PRN IV NAUSEA/VOMITING;  Start 6/3/17 

at 15:30;  Stop 6/4/17 at 15:29;  Status DC


Fentanyl Citrate (Fentanyl 2ml Vial) 50 mcg PRN Q2HR  PRN IV PAIN Last 

administered on 6/4/17at 14:07;  Start 6/3/17 at 15:30;  Stop 6/4/17 at 15:29;  

Status DC


Acetaminophen (Tylenol) 650 mg PRN Q4HRS  PRN PO FEVER;  Start 6/3/17 at 15:30;

  Stop 6/4/17 at 15:29;  Status DC


Nitroglycerin (Nitrostat) 0.4 mg PRN Q5MIN  PRN SL CHEST PAIN;  Start 6/3/17 at 

15:30;  Stop 6/3/17 at 15:34;  Status DC


Insulin Aspart (NovoLOG) 0-5 UNITS TIDWMEALS SQ  Last administered on 6/11/17at 

17:33;  Start 6/3/17 at 17:00;  Stop 6/13/17 at 08:06;  Status DC


Dextrose (Dextrose 50%-Water Syringe) 12.5 gm PRN Q15MIN  PRN IV SEE COMMENTS;  

Start 6/3/17 at 15:30


Metoprolol Tartrate (Lopressor) 2.5 mg Q6HRS IVP ;  Start 6/4/17 at 19:30;  

Stop 6/4/17 at 19:30;  Status DC


Enoxaparin Sodium (Lovenox 150mg Syringe) 130 mg 1X  ONCE SQ  Last administered 

on 6/3/17at 19:51;  Start 6/3/17 at 19:45;  Stop 6/3/17 at 19:48;  Status DC


Metoprolol Tartrate (Lopressor) 2.5 mg Q6HRS IVP  Last administered on 6/4/17at 

05:35;  Start 6/3/17 at 20:30;  Stop 6/4/17 at 12:53;  Status DC


Levothyroxine Sodium (Synthroid) 50 mcg DAILY07 PO ;  Start 6/4/17 at 13:30;  

Stop 6/4/17 at 14:26;  Status DC


Sodium Chloride 1,000 ml @  75 mls/hr [id number] IV  Last administered on 6/5/17at 

02:50;  Start 6/4/17 at 13:30;  Stop 6/5/17 at 19:05;  Status DC


Enoxaparin Sodium (Lovenox Per Pharmacy Treatment Dosing) 1 each PRN DAILY  PRN 

MC SEE COMMENTS;  Start 6/4/17 at 13:00;  Status Cancel


Metoprolol Tartrate (Lopressor) 5 mg Q6HRS IVP  Last administered on 6/5/17at 19

:00;  Start 6/4/17 at 13:30;  Stop 6/5/17 at 21:22;  Status DC


Enoxaparin Sodium (Lovenox 150mg Syringe) 130 mg BID SQ  Last administered on 6/ 4/17at 14:03;  Start 6/4/17 at 13:30;  Stop 6/5/17 at 19:54;  Status DC


Sertraline HCl (Zoloft) 50 mg DAILY PO  Last administered on 6/13/17at 09:26;  

Start 6/4/17 at 14:30


Non-Formulary Medication 2 each BID PO ;  Start 6/4/17 at 21:00;  Status UNV


Info (Anti-Coagulation Monitoring By Pharmacy) 1 each PRN DAILY  PRN MC SEE 

COMMENTS Last administered on 6/5/17at 08:20;  Start 6/5/17 at 08:15;  Stop 6/6/ 17 at 16:21;  Status DC


Lidocaine HCl 20 ml STK-MED ONCE .ROUTE ;  Start 6/5/17 at 13:40;  Stop 6/5/17 

at 13:41;  Status DC


Heparin Sodium/ Sodium Chloride 1,000 ml @  As Directed STK-MED ONCE .ROUTE ;  

Start 6/5/17 at 13:40;  Stop 6/5/17 at 13:41;  Status DC


Iodixanol (Visipaque 320) 100 ml STK-MED ONCE .ROUTE ;  Start 6/5/17 at 13:40;  

Stop 6/5/17 at 13:41;  Status DC


Midazolam HCl (Versed) 2 mg STK-MED ONCE .ROUTE ;  Start 6/5/17 at 14:02;  Stop 

6/5/17 at 14:03;  Status DC


Fentanyl Citrate (Fentanyl 2ml Vial) 100 mcg STK-MED ONCE .ROUTE ;  Start 6/5/ 17 at 14:03;  Stop 6/5/17 at 14:04;  Status DC


Heparin Sodium/ Sodium Chloride 500 ml @  As Directed STK-MED ONCE .ROUTE ;  

Start 6/5/17 at 14:32;  Stop 6/5/17 at 14:33;  Status DC


Nitroglycerin/ Dextrose 250 ml @  As Directed STK-MED ONCE IV ;  Start 6/5/17 

at 14:39;  Stop 6/5/17 at 14:40;  Status DC


Adenosine (Adenoscan) 90 mg STK-MED ONCE IV ;  Start 6/5/17 at 14:49;  Stop 6/5/ 17 at 14:50;  Status DC


Hydralazine HCl (Apresoline) 20 mg STK-MED ONCE .ROUTE ;  Start 6/5/17 at 15:06

;  Stop 6/5/17 at 15:07;  Status DC


Heparin Sodium (Porcine) (Heparin Sodium) 10,000 unit STK-MED ONCE .ROUTE ;  

Start 6/5/17 at 15:17;  Stop 6/5/17 at 15:18;  Status DC


Heparin Sodium/ Sodium Chloride 1,000 unit 1X  ONCE IART  Last administered on 6 /5/17at 15:28;  Start 6/5/17 at 15:30;  Stop 6/5/17 at 15:31;  Status DC


Midazolam HCl (Versed) 2 mg 1X  ONCE IV ;  Start 6/5/17 at 15:30;  Stop 6/5/17 

at 15:31;  Status DC


Fentanyl Citrate (Fentanyl 2ml Vial) 100 mcg 1X  ONCE IV ;  Start 6/5/17 at 15:

30;  Stop 6/5/17 at 15:31;  Status DC


Iodixanol (Visipaque 320) 100 ml 1X  ONCE IART  Last administered on 6/5/17at 15

:28;  Start 6/5/17 at 15:30;  Stop 6/5/17 at 15:31;  Status DC


Heparin Sodium (Porcine) (Heparin Sodium) 4,000 unit 1X  ONCE IV  Last 

administered on 6/5/17at 15:32;  Start 6/5/17 at 15:30;  Stop 6/5/17 at 15:31;  

Status DC


Adenosine 90 mg/ Sodium Chloride 120 ml @  200 mls/hr 1X  ONCE IV  Last 

administered on 6/5/17at 15:29;  Start 6/5/17 at 15:30;  Stop 6/5/17 at 16:05;  

Status DC


Hydralazine HCl (Apresoline) 5 mg 1X  ONCE IVP  Last administered on 6/5/17at 15

:30;  Start 6/5/17 at 15:30;  Stop 6/5/17 at 15:31;  Status DC


Info (Do NOT chart on this entry -- for MONITORING) 1 each PRN DAILY  PRN MC 

SEE COMMENTS;  Start 6/5/17 at 15:30;  Stop 6/7/17 at 15:29;  Status DC


Amlodipine Besylate (Norvasc) 5 mg DAILY PO  Last administered on 6/6/17at 08:30

;  Start 6/6/17 at 09:00;  Stop 6/6/17 at 23:22;  Status DC


Amlodipine Besylate (Norvasc) 5 mg 1X  ONCE PO  Last administered on 6/5/17at 21

:36;  Start 6/5/17 at 21:15;  Stop 6/5/17 at 21:16;  Status DC


Metoprolol Tartrate (Lopressor) 50 mg Q8HRS PO  Last administered on 6/6/17at 21

:39;  Start 6/5/17 at 22:00;  Stop 6/6/17 at 23:22;  Status DC


Acetaminophen (Tylenol) 650 mg PRN QID  PRN PO PAIN Last administered on 6/13/ 17at 11:48;  Start 6/5/17 at 22:00


Pantoprazole Sodium (Protonix) 40 mg DAILYAC PO  Last administered on 6/13/17at 

06:30;  Start 6/6/17 at 11:30


Digoxin (Lanoxin) 500 mcg 1X  ONCE PO  Last administered on 6/6/17at 09:28;  

Start 6/6/17 at 08:45;  Stop 6/6/17 at 08:55;  Status DC


Digoxin (Lanoxin) 250 mcg DAILY PO  Last administered on 6/10/17at 09:03;  

Start 6/6/17 at 16:00;  Stop 6/10/17 at 12:02;  Status DC


Metoprolol Tartrate (Lopressor) 50 mg BID PO  Last administered on 6/11/17at 07:

59;  Start 6/7/17 at 09:00;  Stop 6/11/17 at 11:29;  Status DC


Diltiazem HCl (Cardizem 24hr Cd) 180 mg DAILY PO  Last administered on 6/9/17at 

08:40;  Start 6/7/17 at 09:00;  Stop 6/9/17 at 08:58;  Status DC


Prednisone (Prednisone) 40 mg DAILY PO  Last administered on 6/12/17at 09:12;  

Start 6/7/17 at 09:30;  Stop 6/13/17 at 08:06;  Status DC


Metformin HCl (Glucophage) 1,000 mg BIDWMEALS PO  Last administered on 6/13/ 17at 09:26;  Start 6/7/17 at 17:00


Levofloxacin/ Dextrose 100 ml @  100 mls/hr 1X PREOP  PRN IV prophylaxis Last 

administered on 6/8/17at 08:44;  Start 6/8/17 at 06:00;  Stop 6/8/17 at 18:00;  

Status DC


Fentanyl Citrate (Fentanyl 2ml Vial) 25 mcg PRN Q5MIN  PRN IV MILD PAIN;  Start 

6/8/17 at 07:00;  Stop 6/9/17 at 06:59;  Status DC


Fentanyl Citrate (Fentanyl 2ml Vial) 50 mcg PRN Q5MIN  PRN IV MODERATE PAIN;  

Start 6/8/17 at 07:00;  Stop 6/9/17 at 06:59;  Status DC


Morphine Sulfate 1 mg PRN Q10MIN  PRN IV SEVERE PAIN;  Start 6/8/17 at 07:00;  

Stop 6/9/17 at 06:59;  Status DC


Ringer's Solution 1,000 ml @  0 mls/hr Q0M IV ;  Start 6/8/17 at 07:00;  Stop 6/ 8/17 at 18:59;  Status DC


Lidocaine HCl 2 ml PRN 1X  PRN ID PRIOR TO IV START;  Start 6/8/17 at 07:00;  

Stop 6/9/17 at 06:59;  Status DC


Hydromorphone HCl (Dilaudid) 0.5 mg PRN Q10MIN  PRN IV SEV PAIN, Second choice;

  Start 6/8/17 at 07:00;  Stop 6/9/17 at 06:59;  Status DC


Prochlorperazine Edisylate (Compazine) 5 mg PACU PRN  PRN IV NAUSEA, MRX1;  

Start 6/8/17 at 07:00;  Stop 6/9/17 at 06:59;  Status DC


Bupivacaine HCl/ Epinephrine Bitart (Sensorcain-Mpf Epi 0.5%-1:[id number]) 30 ml STK

-MED ONCE .ROUTE ;  Start 6/8/17 at 07:38;  Stop 6/8/17 at 07:39;  Status DC


Bupivacaine HCl (Sensorcaine Mpf 0.5%) 30 ml STK-MED ONCE .ROUTE  Last 

administered on 6/8/17at 09:10;  Start 6/8/17 at 07:38;  Stop 6/8/17 at 07:39;  

Status DC


Fentanyl Citrate (Fentanyl 2ml Vial) 100 mcg STK-MED ONCE .ROUTE ;  Start 6/8/ 17 at 08:21;  Stop 6/8/17 at 08:22;  Status DC


Neomycin/ Polymyxin/ Bacitracin (Triple Antibiotic Ointment) 1 pkt STK-MED ONCE 

TP ;  Start 6/8/17 at 08:46;  Stop 6/8/17 at 08:47;  Status DC


Dexamethasone Sodium Phosphate (Decadron) 20 mg STK-MED ONCE .ROUTE ;  Start 6/8 /17 at 09:08;  Stop 6/8/17 at 09:09;  Status DC


Ondansetron HCl (Zofran) 4 mg STK-MED ONCE .ROUTE ;  Start 6/8/17 at 09:08;  

Stop 6/8/17 at 09:09;  Status DC


Propofol 20 ml @ As Directed STK-MED ONCE IV ;  Start 6/8/17 at 09:08;  Stop 6/8 /17 at 09:09;  Status DC


Lidocaine HCl (Lidocaine Pf 2% Vial) 5 ml STK-MED ONCE .ROUTE ;  Start 6/8/17 

at 09:08;  Stop 6/8/17 at 09:09;  Status DC


Sevoflurane (Ultane) 30 ml STK-MED ONCE IH ;  Start 6/8/17 at 09:08;  Stop 6/8/ 17 at 09:09;  Status DC


Phenylephrine HCl 1 mg STK-MED ONCE IV ;  Start 6/8/17 at 09:08;  Stop 6/8/17 

at 09:09;  Status DC


Oxycodone/ Acetaminophen (Percocet 5/325) 1 tab PRN Q6HRS  PRN PO PAIN Last 

administered on 6/13/17at 06:31;  Start 6/8/17 at 16:00;  Stop 6/13/17 at 08:07

;  Status DC


Insulin Aspart (NovoLOG) 2 units 1X  ONCE SQ  Last administered on 6/8/17at 21:

51;  Start 6/8/17 at 21:30;  Stop 6/8/17 at 21:31;  Status DC


Insulin Detemir (Levemir) 10 units BID SQ  Last administered on 6/11/17at 08:04

;  Start 6/9/17 at 09:00;  Stop 6/11/17 at 22:32;  Status DC


Diltiazem HCl (Cardizem 24hr Cd) 240 mg DAILY PO  Last administered on 6/13/ 17at 09:27;  Start 6/9/17 at 09:00


Apixaban (Eliquis) 5 mg BID PO  Last administered on 6/10/17at 09:03;  Start 6/9 /17 at 10:00;  Stop 6/10/17 at 12:01;  Status DC


Info (Anti-Coagulation Monitoring By Pharmacy) 1 each PRN DAILY  PRN MC SEE 

COMMENTS Last administered on 6/10/17at 09:23;  Start 6/10/17 at 09:30;  Stop 6/

10/17 at 12:03;  Status DC


Metoprolol Tartrate (Lopressor) 25 mg BID PO  Last administered on 6/11/17at 21:

26;  Start 6/11/17 at 21:00;  Stop 6/12/17 at 08:39;  Status DC


Insulin Detemir (Levemir) 6 units BID SQ ;  Start 6/12/17 at 09:00;  Stop 6/12/ 17 at 09:00;  Status DC


Insulin Detemir (Levemir) 6 units BID SQ  Last administered on 6/11/17at 22:45;

  Start 6/11/17 at 23:00;  Stop 6/12/17 at 08:39;  Status DC


Sotalol HCl (Betapace) 40 mg BID PO  Last administered on 6/13/17at 09:27;  

Start 6/12/17 at 09:00


Glipizide (Glucotrol) 2.5 mg BID PO  Last administered on 6/13/17at 09:26;  

Start 6/12/17 at 09:00;  Stop 6/13/17 at 13:57;  Status DC


Ibuprofen (Motrin) 400 mg PRN Q6HRS  PRN PO INFLAMMATION;  Start 6/13/17 at 08:

15


Gadobutrol (Gadavist) 10 mmol 1X  ONCE IV ;  Start 6/13/17 at 13:45;  Stop 6/13/ 17 at 13:46;  Status DC


Glipizide (Glucotrol) 2.5 mg BIDWMEALS PO ;  Start 6/13/17 at 17:00





Active Scripts


Active


Pantoprazole Sodium 40 Mg Tablet.dr 40 Mg PO BIDAC


Reported


Stephanie Back & Body Caplet (Aspirin/Caffeine) 1 Each Tablet 2 Each PO BID


Zoloft (Sertraline Hcl) 50 Mg Tablet 1 Tab PO DAILY


Glipizide 5 Mg Tablet 0.5 Tab PO BID


Potassium Chloride 20 Meq Tablet.er 20 Meq PO BID


     last dose this am


     next dose with supper


Sotalol (Sotalol Hcl) 80 Mg Tablet 40 Mg PO BID


     last dose this am


     next dose tonight


Furosemide 80 Mg Tablet 80 Mg PO DAILY


     last dose this am


     next dose tomorrow


Atorvastatin Calcium 40 Mg Tablet 40 Mg PO HS


     last dose was last dose


     next dose tonight


Metformin Hcl 1,000 Mg Tablet 1,000 Mg PO BID


     last dose this am


     next dose with supper


Vitals/I & O





Vital Sign - Last 24 Hours








 6/12/17 6/12/17 6/12/17 6/12/17





 19:49 19:51 20:10 21:13


 


Temp 98.2   





 98.2   


 


Pulse 124   124


 


Resp 20   


 


B/P (MAP) 137/90 (106)   137/90


 


Pulse Ox 91 93  


 


O2 Delivery Room Air  Room Air 


 


    





    





 6/12/17 6/13/17 6/13/17 6/13/17





 22:24 03:44 06:31 07:57


 


Temp  97.3  98.1





  97.3  98.1


 


Pulse 124 87  90


 


Resp 20 20  20


 


B/P (MAP) 142/83 (102) 133/98 (110)  148/103 (118)


 


Pulse Ox 95 93  94


 


O2 Delivery Room Air Room Air Room Air Room Air


 


    





    





 6/13/17 6/13/17 6/13/17 





 09:27 09:27 11:06 


 


Temp   97.6 





   97.6 


 


Pulse 98 98 85 


 


Resp   20 


 


B/P (MAP) 148/103 148/103 130/84 (99) 


 


O2 Delivery   Room Air 














Intake and Output   


 


 6/12/17 6/12/17 6/13/17





 15:00 23:00 07:00


 


Intake Total  2820 ml 450 ml


 


Output Total  300 ml 1400 ml


 


Balance  2520 ml -950 ml

















BRITTNEE AMAYA MD Jun 13, 2017 17:36

## 2017-06-13 NOTE — RAD
MRI Brain with and without contrast

 

History: Worsening headaches

 

Technique: Axial diffusion, axial gradient echo T2, axial T2, axial FLAIR,

sagittal and axial T1, and postcontrast axial, sagittal, and coronal 

T1-weighted images were acquired of the brain.

 

Contrast:  10 cc  Gadavist

 

Comparison: None

 

Findings: There is motion degradation. There is no evidence of recent 

infarct or cytotoxic edema. Ventricular size is proportionate to the 

sulcal spaces. There is mild to moderate generalized supratentorial 

atrophy.There is no significant midline shift, intraaxial mass effect, or 

focal abnormal extra-axial fluid collection. There is no significant 

signal abnormality including hemosiderin deposition of the brain 

parenchyma.   There is no nodular parenchymal or leptomeningeal 

enhancement. There is preservation of the major intracranial flow-voids at

the skull base. The cerebellar tonsils are normal in location. There is no

significant abnormality of the pineal gland. Pituitary gland is small. 

Paranasal sinuses are overall aerated. The mastoid air cells are aerated. 

There is preserved marrow signal of the clivus. There has been lens 

surgery bilaterally.

 

 

Impression:

1. There is no evidence of recent infarct or intracranial mass effect. 

There is generalized supratentorial atrophy..

 

Electronically signed by: Brian Long MD (6/13/2017 2:42 PM)

## 2017-06-13 NOTE — PDOC
Provider Note


Provider Note


hr better on sotalol, no new sxs- ha persists , TA bx neg for TA so will stop 

prednisone, mri in progress re headaches











DAPHNEY NAIR MD Jun 13, 2017 08:05

## 2017-06-14 VITALS — DIASTOLIC BLOOD PRESSURE: 87 MMHG | SYSTOLIC BLOOD PRESSURE: 125 MMHG

## 2017-06-14 VITALS — SYSTOLIC BLOOD PRESSURE: 131 MMHG | DIASTOLIC BLOOD PRESSURE: 80 MMHG

## 2017-06-14 VITALS — DIASTOLIC BLOOD PRESSURE: 94 MMHG | SYSTOLIC BLOOD PRESSURE: 148 MMHG

## 2017-06-14 RX ADMIN — SERTRALINE HYDROCHLORIDE SCH MG: 50 TABLET ORAL at 08:23

## 2017-06-14 RX ADMIN — SOTALOL HYDROCHLORIDE SCH MG: 80 TABLET ORAL at 08:26

## 2017-06-14 RX ADMIN — PANTOPRAZOLE SODIUM SCH MG: 40 TABLET, DELAYED RELEASE ORAL at 06:30

## 2017-06-14 NOTE — PDOC
PROGRESS NOTES


Subjective


Subjective


Patient is on and off confused again.





2 of the family members were present and we had a long discussion with regards 

to his situation.





Objective


Objective





Vital Signs








  Date Time  Temp Pulse Resp B/P (MAP) Pulse Ox O2 Delivery O2 Flow Rate FiO2


 


6/14/17 10:47 98.1 115 19 125/87 (100) 96 Room Air  





 98.1       


 


6/8/17 09:31       10 














Intake and Output 


 


 6/14/17





 07:00


 


Intake Total 1700 ml


 


Output Total 1900 ml


 


Balance -200 ml


 


 


 


Intake Oral 1700 ml


 


Output Urine Total 1900 ml


 


# Voids 4











Physical Exam


Physical Exam


No significant changes in cardiac exam





Assessment


Assessment


This time the patient appears to be hemodynamically stable.





I agree with discharging the patient and following him as an outpatient with 

home health and once he is in better shape both mentally as well as physically 

then we will need to have a discussion to decide on how he is to be treated.





At the present time he is not a surgical candidate.





The patient's family agreed with this approach.





May go home today





Comment


Review of Relevant


I have reviewed the following items nirmal (where applicable) has been applied.


Labs





Laboratory Tests








Test


  6/13/17


08:00 6/13/17


11:07 6/13/17


17:05 6/13/17


20:36


 


Glucose (Fingerstick)


  98 mg/dL


(70-99) 137 mg/dL


(70-99) 115 mg/dL


(70-99) 174 mg/dL


(70-99)


 


Test


  6/14/17


07:47 6/14/17


11:58 


  


 


 


Glucose (Fingerstick)


  91 mg/dL


(70-99) 128 mg/dL


(70-99) 


  


 








Laboratory Tests








Test


  6/14/17


07:47 6/14/17


11:58


 


Glucose (Fingerstick)


  91 mg/dL


(70-99) 128 mg/dL


(70-99)








Medications





Current Medications


Aspirin (Children'S Aspirin) 324 mg 1X  ONCE PO  Last administered on 6/3/17at 

12:19;  Start 6/3/17 at 12:15;  Stop 6/3/17 at 12:16;  Status DC


Fentanyl Citrate (Fentanyl 2ml Vial) 25 mcg PRN Q15MIN  PRN IV PAIN GREATER 

THAN 3/10 Last administered on 6/3/17at 12:27;  Start 6/3/17 at 12:15;  Stop 6/3

/17 at 15:34;  Status DC


Nitroglycerin (Nitrostat) 0.4 mg PRN Q5MIN  PRN SL CHEST PAIN;  Start 6/3/17 at 

12:15;  Stop 6/14/17 at 15:44;  Status DC


Amiodarone HCl 150 mg/Dextrose 103 ml @  618 mls/hr 1X  ONCE IV  Last 

administered on 6/3/17at 12:24;  Start 6/3/17 at 12:15;  Stop 6/3/17 at 12:24;  

Status DC


Amiodarone HCl 900 mg/Dextrose 518 ml @ 0 mls/hr CONT  PRN IV SEE I/O RECORD 

Last administered on 6/3/17at 12:34;  Start 6/3/17 at 12:15;  Stop 6/3/17 at 12:

35;  Status DC


Sodium Chloride 1,000 ml @  1,000 mls/hr 1X  ONCE IV  Last administered on 6/3/

17at 12:35;  Start 6/3/17 at 12:45;  Stop 6/3/17 at 13:44;  Status DC


Digoxin (Lanoxin) 500 mcg 1X  ONCE PO  Last administered on 6/3/17at 12:50;  

Start 6/3/17 at 13:00;  Stop 6/3/17 at 13:01;  Status DC


Sodium Chloride 500 ml @  500 mls/hr 1X  ONCE IV ;  Start 6/3/17 at 12:45;  

Stop 6/3/17 at 13:44;  Status DC


Ondansetron HCl (Zofran) 4 mg PRN Q8HRS  PRN IV NAUSEA/VOMITING;  Start 6/3/17 

at 15:30;  Stop 6/4/17 at 15:29;  Status DC


Fentanyl Citrate (Fentanyl 2ml Vial) 50 mcg PRN Q2HR  PRN IV PAIN Last 

administered on 6/4/17at 14:07;  Start 6/3/17 at 15:30;  Stop 6/4/17 at 15:29;  

Status DC


Acetaminophen (Tylenol) 650 mg PRN Q4HRS  PRN PO FEVER;  Start 6/3/17 at 15:30;

  Stop 6/4/17 at 15:29;  Status DC


Nitroglycerin (Nitrostat) 0.4 mg PRN Q5MIN  PRN SL CHEST PAIN;  Start 6/3/17 at 

15:30;  Stop 6/3/17 at 15:34;  Status DC


Insulin Aspart (NovoLOG) 0-5 UNITS TIDWMEALS SQ  Last administered on 6/11/17at 

17:33;  Start 6/3/17 at 17:00;  Stop 6/13/17 at 08:06;  Status DC


Dextrose (Dextrose 50%-Water Syringe) 12.5 gm PRN Q15MIN  PRN IV SEE COMMENTS;  

Start 6/3/17 at 15:30;  Stop 6/14/17 at 15:44;  Status DC


Metoprolol Tartrate (Lopressor) 2.5 mg Q6HRS IVP ;  Start 6/4/17 at 19:30;  

Stop 6/4/17 at 19:30;  Status DC


Enoxaparin Sodium (Lovenox 150mg Syringe) 130 mg 1X  ONCE SQ  Last administered 

on 6/3/17at 19:51;  Start 6/3/17 at 19:45;  Stop 6/3/17 at 19:48;  Status DC


Metoprolol Tartrate (Lopressor) 2.5 mg Q6HRS IVP  Last administered on 6/4/17at 

05:35;  Start 6/3/17 at 20:30;  Stop 6/4/17 at 12:53;  Status DC


Levothyroxine Sodium (Synthroid) 50 mcg DAILY07 PO ;  Start 6/4/17 at 13:30;  

Stop 6/4/17 at 14:26;  Status DC


Sodium Chloride 1,000 ml @  75 mls/hr [id number] IV  Last administered on 6/5/17at 

02:50;  Start 6/4/17 at 13:30;  Stop 6/5/17 at 19:05;  Status DC


Enoxaparin Sodium (Lovenox Per Pharmacy Treatment Dosing) 1 each PRN DAILY  PRN 

MC SEE COMMENTS;  Start 6/4/17 at 13:00;  Status Cancel


Metoprolol Tartrate (Lopressor) 5 mg Q6HRS IVP  Last administered on 6/5/17at 19

:00;  Start 6/4/17 at 13:30;  Stop 6/5/17 at 21:22;  Status DC


Enoxaparin Sodium (Lovenox 150mg Syringe) 130 mg BID SQ  Last administered on 6/ 4/17at 14:03;  Start 6/4/17 at 13:30;  Stop 6/5/17 at 19:54;  Status DC


Sertraline HCl (Zoloft) 50 mg DAILY PO  Last administered on 6/14/17at 08:23;  

Start 6/4/17 at 14:30;  Stop 6/14/17 at 15:44;  Status DC


Non-Formulary Medication 2 each BID PO ;  Start 6/4/17 at 21:00;  Status UNV


Info (Anti-Coagulation Monitoring By Pharmacy) 1 each PRN DAILY  PRN MC SEE 

COMMENTS Last administered on 6/5/17at 08:20;  Start 6/5/17 at 08:15;  Stop 6/6/ 17 at 16:21;  Status DC


Lidocaine HCl 20 ml STK-MED ONCE .ROUTE ;  Start 6/5/17 at 13:40;  Stop 6/5/17 

at 13:41;  Status DC


Heparin Sodium/ Sodium Chloride 1,000 ml @  As Directed STK-MED ONCE .ROUTE ;  

Start 6/5/17 at 13:40;  Stop 6/5/17 at 13:41;  Status DC


Iodixanol (Visipaque 320) 100 ml STK-MED ONCE .ROUTE ;  Start 6/5/17 at 13:40;  

Stop 6/5/17 at 13:41;  Status DC


Midazolam HCl (Versed) 2 mg STK-MED ONCE .ROUTE ;  Start 6/5/17 at 14:02;  Stop 

6/5/17 at 14:03;  Status DC


Fentanyl Citrate (Fentanyl 2ml Vial) 100 mcg STK-MED ONCE .ROUTE ;  Start 6/5/ 17 at 14:03;  Stop 6/5/17 at 14:04;  Status DC


Heparin Sodium/ Sodium Chloride 500 ml @  As Directed STK-MED ONCE .ROUTE ;  

Start 6/5/17 at 14:32;  Stop 6/5/17 at 14:33;  Status DC


Nitroglycerin/ Dextrose 250 ml @  As Directed STK-MED ONCE IV ;  Start 6/5/17 

at 14:39;  Stop 6/5/17 at 14:40;  Status DC


Adenosine (Adenoscan) 90 mg STK-MED ONCE IV ;  Start 6/5/17 at 14:49;  Stop 6/5/ 17 at 14:50;  Status DC


Hydralazine HCl (Apresoline) 20 mg STK-MED ONCE .ROUTE ;  Start 6/5/17 at 15:06

;  Stop 6/5/17 at 15:07;  Status DC


Heparin Sodium (Porcine) (Heparin Sodium) 10,000 unit STK-MED ONCE .ROUTE ;  

Start 6/5/17 at 15:17;  Stop 6/5/17 at 15:18;  Status DC


Heparin Sodium/ Sodium Chloride 1,000 unit 1X  ONCE IART  Last administered on 6 /5/17at 15:28;  Start 6/5/17 at 15:30;  Stop 6/5/17 at 15:31;  Status DC


Midazolam HCl (Versed) 2 mg 1X  ONCE IV ;  Start 6/5/17 at 15:30;  Stop 6/5/17 

at 15:31;  Status DC


Fentanyl Citrate (Fentanyl 2ml Vial) 100 mcg 1X  ONCE IV ;  Start 6/5/17 at 15:

30;  Stop 6/5/17 at 15:31;  Status DC


Iodixanol (Visipaque 320) 100 ml 1X  ONCE IART  Last administered on 6/5/17at 15

:28;  Start 6/5/17 at 15:30;  Stop 6/5/17 at 15:31;  Status DC


Heparin Sodium (Porcine) (Heparin Sodium) 4,000 unit 1X  ONCE IV  Last 

administered on 6/5/17at 15:32;  Start 6/5/17 at 15:30;  Stop 6/5/17 at 15:31;  

Status DC


Adenosine 90 mg/ Sodium Chloride 120 ml @  200 mls/hr 1X  ONCE IV  Last 

administered on 6/5/17at 15:29;  Start 6/5/17 at 15:30;  Stop 6/5/17 at 16:05;  

Status DC


Hydralazine HCl (Apresoline) 5 mg 1X  ONCE IVP  Last administered on 6/5/17at 15

:30;  Start 6/5/17 at 15:30;  Stop 6/5/17 at 15:31;  Status DC


Info (Do NOT chart on this entry -- for MONITORING) 1 each PRN DAILY  PRN MC 

SEE COMMENTS;  Start 6/5/17 at 15:30;  Stop 6/7/17 at 15:29;  Status DC


Amlodipine Besylate (Norvasc) 5 mg DAILY PO  Last administered on 6/6/17at 08:30

;  Start 6/6/17 at 09:00;  Stop 6/6/17 at 23:22;  Status DC


Amlodipine Besylate (Norvasc) 5 mg 1X  ONCE PO  Last administered on 6/5/17at 21

:36;  Start 6/5/17 at 21:15;  Stop 6/5/17 at 21:16;  Status DC


Metoprolol Tartrate (Lopressor) 50 mg Q8HRS PO  Last administered on 6/6/17at 21

:39;  Start 6/5/17 at 22:00;  Stop 6/6/17 at 23:22;  Status DC


Acetaminophen (Tylenol) 650 mg PRN QID  PRN PO PAIN Last administered on 6/13/ 17at 11:48;  Start 6/5/17 at 22:00;  Stop 6/14/17 at 15:44;  Status DC


Pantoprazole Sodium (Protonix) 40 mg DAILYAC PO  Last administered on 6/14/17at 

06:30;  Start 6/6/17 at 11:30;  Stop 6/14/17 at 15:44;  Status DC


Digoxin (Lanoxin) 500 mcg 1X  ONCE PO  Last administered on 6/6/17at 09:28;  

Start 6/6/17 at 08:45;  Stop 6/6/17 at 08:55;  Status DC


Digoxin (Lanoxin) 250 mcg DAILY PO  Last administered on 6/10/17at 09:03;  

Start 6/6/17 at 16:00;  Stop 6/10/17 at 12:02;  Status DC


Metoprolol Tartrate (Lopressor) 50 mg BID PO  Last administered on 6/11/17at 07:

59;  Start 6/7/17 at 09:00;  Stop 6/11/17 at 11:29;  Status DC


Diltiazem HCl (Cardizem 24hr Cd) 180 mg DAILY PO  Last administered on 6/9/17at 

08:40;  Start 6/7/17 at 09:00;  Stop 6/9/17 at 08:58;  Status DC


Prednisone (Prednisone) 40 mg DAILY PO  Last administered on 6/12/17at 09:12;  

Start 6/7/17 at 09:30;  Stop 6/13/17 at 08:06;  Status DC


Metformin HCl (Glucophage) 1,000 mg BIDWMEALS PO  Last administered on 6/14/ 17at 08:23;  Start 6/7/17 at 17:00;  Stop 6/14/17 at 15:44;  Status DC


Levofloxacin/ Dextrose 100 ml @  100 mls/hr 1X PREOP  PRN IV prophylaxis Last 

administered on 6/8/17at 08:44;  Start 6/8/17 at 06:00;  Stop 6/8/17 at 18:00;  

Status DC


Fentanyl Citrate (Fentanyl 2ml Vial) 25 mcg PRN Q5MIN  PRN IV MILD PAIN;  Start 

6/8/17 at 07:00;  Stop 6/9/17 at 06:59;  Status DC


Fentanyl Citrate (Fentanyl 2ml Vial) 50 mcg PRN Q5MIN  PRN IV MODERATE PAIN;  

Start 6/8/17 at 07:00;  Stop 6/9/17 at 06:59;  Status DC


Morphine Sulfate 1 mg PRN Q10MIN  PRN IV SEVERE PAIN;  Start 6/8/17 at 07:00;  

Stop 6/9/17 at 06:59;  Status DC


Ringer's Solution 1,000 ml @  0 mls/hr Q0M IV ;  Start 6/8/17 at 07:00;  Stop 6/ 8/17 at 18:59;  Status DC


Lidocaine HCl 2 ml PRN 1X  PRN ID PRIOR TO IV START;  Start 6/8/17 at 07:00;  

Stop 6/9/17 at 06:59;  Status DC


Hydromorphone HCl (Dilaudid) 0.5 mg PRN Q10MIN  PRN IV SEV PAIN, Second choice;

  Start 6/8/17 at 07:00;  Stop 6/9/17 at 06:59;  Status DC


Prochlorperazine Edisylate (Compazine) 5 mg PACU PRN  PRN IV NAUSEA, MRX1;  

Start 6/8/17 at 07:00;  Stop 6/9/17 at 06:59;  Status DC


Bupivacaine HCl/ Epinephrine Bitart (Sensorcain-Mpf Epi 0.5%-1:980421) 30 ml STK

-MED ONCE .ROUTE ;  Start 6/8/17 at 07:38;  Stop 6/8/17 at 07:39;  Status DC


Bupivacaine HCl (Sensorcaine Mpf 0.5%) 30 ml STK-MED ONCE .ROUTE  Last 

administered on 6/8/17at 09:10;  Start 6/8/17 at 07:38;  Stop 6/8/17 at 07:39;  

Status DC


Fentanyl Citrate (Fentanyl 2ml Vial) 100 mcg STK-MED ONCE .ROUTE ;  Start 6/8/ 17 at 08:21;  Stop 6/8/17 at 08:22;  Status DC


Neomycin/ Polymyxin/ Bacitracin (Triple Antibiotic Ointment) 1 pkt STK-MED ONCE 

TP ;  Start 6/8/17 at 08:46;  Stop 6/8/17 at 08:47;  Status DC


Dexamethasone Sodium Phosphate (Decadron) 20 mg STK-MED ONCE .ROUTE ;  Start 6/8 /17 at 09:08;  Stop 6/8/17 at 09:09;  Status DC


Ondansetron HCl (Zofran) 4 mg STK-MED ONCE .ROUTE ;  Start 6/8/17 at 09:08;  

Stop 6/8/17 at 09:09;  Status DC


Propofol 20 ml @ As Directed STK-MED ONCE IV ;  Start 6/8/17 at 09:08;  Stop 6/8 /17 at 09:09;  Status DC


Lidocaine HCl (Lidocaine Pf 2% Vial) 5 ml STK-MED ONCE .ROUTE ;  Start 6/8/17 

at 09:08;  Stop 6/8/17 at 09:09;  Status DC


Sevoflurane (Ultane) 30 ml STK-MED ONCE IH ;  Start 6/8/17 at 09:08;  Stop 6/8/ 17 at 09:09;  Status DC


Phenylephrine HCl 1 mg STK-MED ONCE IV ;  Start 6/8/17 at 09:08;  Stop 6/8/17 

at 09:09;  Status DC


Oxycodone/ Acetaminophen (Percocet 5/325) 1 tab PRN Q6HRS  PRN PO PAIN Last 

administered on 6/13/17at 06:31;  Start 6/8/17 at 16:00;  Stop 6/13/17 at 08:07

;  Status DC


Insulin Aspart (NovoLOG) 2 units 1X  ONCE SQ  Last administered on 6/8/17at 21:

51;  Start 6/8/17 at 21:30;  Stop 6/8/17 at 21:31;  Status DC


Insulin Detemir (Levemir) 10 units BID SQ  Last administered on 6/11/17at 08:04

;  Start 6/9/17 at 09:00;  Stop 6/11/17 at 22:32;  Status DC


Diltiazem HCl (Cardizem 24hr ) 240 mg DAILY PO  Last administered on 6/14/ 17at 08:24;  Start 6/9/17 at 09:00;  Stop 6/14/17 at 15:44;  Status DC


Apixaban (Eliquis) 5 mg BID PO  Last administered on 6/10/17at 09:03;  Start 6/9 /17 at 10:00;  Stop 6/10/17 at 12:01;  Status DC


Info (Anti-Coagulation Monitoring By Pharmacy) 1 each PRN DAILY  PRN MC SEE 

COMMENTS Last administered on 6/10/17at 09:23;  Start 6/10/17 at 09:30;  Stop 6/

10/17 at 12:03;  Status DC


Metoprolol Tartrate (Lopressor) 25 mg BID PO  Last administered on 6/11/17at 21:

26;  Start 6/11/17 at 21:00;  Stop 6/12/17 at 08:39;  Status DC


Insulin Detemir (Levemir) 6 units BID SQ ;  Start 6/12/17 at 09:00;  Stop 6/12/ 17 at 09:00;  Status DC


Insulin Detemir (Levemir) 6 units BID SQ  Last administered on 6/11/17at 22:45;

  Start 6/11/17 at 23:00;  Stop 6/12/17 at 08:39;  Status DC


Sotalol HCl (Betapace) 40 mg BID PO  Last administered on 6/14/17at 08:26;  

Start 6/12/17 at 09:00;  Stop 6/14/17 at 15:44;  Status DC


Glipizide (Glucotrol) 2.5 mg BID PO  Last administered on 6/13/17at 09:26;  

Start 6/12/17 at 09:00;  Stop 6/13/17 at 13:57;  Status DC


Ibuprofen (Motrin) 400 mg PRN Q6HRS  PRN PO INFLAMMATION;  Start 6/13/17 at 08:

15;  Stop 6/14/17 at 15:44;  Status DC


Gadobutrol (Gadavist) 10 mmol 1X  ONCE IV ;  Start 6/13/17 at 13:45;  Stop 6/13/ 17 at 13:46;  Status DC


Glipizide (Glucotrol) 2.5 mg BIDWMEALS PO  Last administered on 6/14/17at 08:24

;  Start 6/13/17 at 17:00;  Stop 6/14/17 at 15:44;  Status DC





Active Scripts


Active


Pantoprazole Sodium 40 Mg Tablet.dr 40 Mg PO BIDAC


Reported


Stephanie Back & Body Caplet (Aspirin/Caffeine) 1 Each Tablet 2 Each PO BID


Zoloft (Sertraline Hcl) 50 Mg Tablet 1 Tab PO DAILY


Glipizide 5 Mg Tablet 0.5 Tab PO BID


Potassium Chloride 20 Meq Tablet.er 20 Meq PO BID


     last dose this am


     next dose with supper


Sotalol (Sotalol Hcl) 80 Mg Tablet 40 Mg PO BID


     last dose this am


     next dose tonight


Furosemide 80 Mg Tablet 80 Mg PO DAILY


     last dose this am


     next dose tomorrow


Atorvastatin Calcium 40 Mg Tablet 40 Mg PO HS


     last dose was last dose


     next dose tonight


Metformin Hcl 1,000 Mg Tablet 1,000 Mg PO BID


     last dose this am


     next dose with supper


Vitals/I & O





Vital Sign - Last 24 Hours








 6/13/17 6/14/17 6/14/17 6/14/17





 23:04 03:00 07:00 08:00


 


Temp 97.4 98.3 97.3 





 97.4 98.3 97.3 


 


Pulse 99 91 107 


 


Resp 18 18 22 


 


B/P (MAP) 121/73 (89) 148/94 (112) 131/80 (97) 


 


Pulse Ox 95 94 96 


 


O2 Delivery Room Air Room Air Room Air Room Air


 


    





    





 6/14/17 6/14/17 6/14/17 





 08:24 08:26 10:47 


 


Temp   98.1 





   98.1 


 


Pulse 107 107 115 


 


Resp   19 


 


B/P (MAP) 131/80 131/80 125/87 (100) 


 


Pulse Ox   96 


 


O2 Delivery   Room Air 














Intake and Output   


 


 6/13/17 6/13/17 6/14/17





 15:00 23:00 07:00


 


Intake Total 1240 ml 240 ml 220 ml


 


Output Total 500 ml 600 ml 800 ml


 


Balance 740 ml -360 ml -580 ml

















BRITTNEE AMAYA MD Jun 14, 2017 21:26

## 2017-06-14 NOTE — PDOC
PROGRESS NOTES


Assessment


Assessment


Worsening of headaches.


Chronic headache.


MS changes.


DM


AFib/flutter


Syncope.


Obesity.


No evidence of acute CVA or brain tumor.





RECOMMENDATIONS/PLAN:


Pain control


Treat medical and cardiac diseases.


OT/PT.


FU with PCP.


FU with Neurology as needed.





Brain MRI w/wo contrast on 6/13/17: No acute findings.





HISTORY OF THE PRESENT ILLNESS:


67-y-old  male patient was admitted due to cardiac diseases. he has Hx 

of chronic headaches but his headaches became worse now and he rated his 

headaches 10/10. His haedaches were described as sharp pain in his left side of 

head to right side of head per his description.


He stated his headaches were resolved since 6/13. 





PAST MEDICAL HISTORY:  


AFib/flutter, CHF, diabetes, atherosclerotic heart disease, aortic 

insufficiency.





ALLERGIES: 


ALLERGIC TO PENICILLIN.





SOCIAL HISTORY:  


He is nonsmoker, nondrinker, does not use drugs.  He is retired


from the [employer].





FAMILY HISTORY:  


Noncontributory.





MEDICATIONS:


Refer to MAR





FAMILY HISTORY: 


Non contributory.





SOCIAL HISTORY: 


Denies smoking, drinking, and illicit drug use.  





REVIEW OF SYSTEMS:


Constitutional: No malnutrition, weight loss, cachexia.


Head: No traumatic brain or head injury.


Skin: No edema, or rash.


Ear: pain in left side ear area.


Eyes: No vision loss or color blindness.


Nose: No bleeding or purulent discharges.


Hearing: Hearing decrease.


Neck: No injury.


Cardiac:  AFib, syncope.


Pulmonary: No COPD.


GI: No GI ulcer, GI bleeding.


Urinary/genital: No dysuria, hematuria, incontinence.


Endocrinologic: Diabetes Mellitus, obesity.


Skeletomuscular: No muscular atrophy, deformity.


Neurological: see  HP.


Psychiatric: Denies drug use/abuse.


Otherwise, not pertinant14-point review of systems.





PHYSICAL EXAMINATION:   


General appearance is in subacute distress.  


HEENT:  Normocephalic and nontraumatic.  Eyes, nose, ears, and throat are 

unremarkable.  


Neck is supple. No lymphadenopathy. No crepitus. 


Cardiovascular:  S1, S2, regular rate and rhythm.  


Pulmonary:  Clear to auscultation bilaterally. 


Abdomen:  Bowel sounds are positive.  Abdomen is soft, nontender, and 

nondistended.    


Extremities:  No rash, lesions, or edema.  


No restriction of range of motion





NEUROLOGICAL  EXAMINATION:


Awake.


Sitting in chair.


Oriented to time, place and person.


PERRL.


EOMI.


CN: no focal findings.


Muscle tone: within normal.


Muscle strength: 4+


DTR: 2


Plantar reflex: Neutral response bilaterally 


Gait: not examined in bed. 


Sensory exam: no abnormal findings.


No acute cerebellar signs elicited.


F-T-N test fine.





Objective


Objective





Vital Signs








  Date Time  Temp Pulse Resp B/P (MAP) Pulse Ox O2 Delivery O2 Flow Rate FiO2


 


6/14/17 10:47 98.1 115 19 125/87 (100) 96 Room Air  





 98.1       














Intake and Output 


 


 6/14/17





 07:00


 


Intake Total 1700 ml


 


Output Total 1900 ml


 


Balance -200 ml


 


 


 


Intake Oral 1700 ml


 


Output Urine Total 1900 ml


 


# Voids 4











Vitals Signs


Vitals





 VS - Last 72 Hours, by Label








  Date Time  Temp Pulse Resp B/P (MAP) Pulse Ox O2 Delivery O2 Flow Rate FiO2


 


6/14/17 10:47 98.1 115 19 125/87 (100) 96 Room Air  





 98.1       


 


6/14/17 08:26  107  131/80    


 


6/14/17 08:24  107  131/80    


 


6/14/17 08:00      Room Air  


 


6/14/17 07:00 97.3 107 22 131/80 (97) 96 Room Air  





 97.3       


 


6/14/17 03:00 98.3 91 18 148/94 (112) 94 Room Air  





 98.3       


 


6/13/17 23:04 97.4 99 18 121/73 (89) 95 Room Air  





 97.4       


 


6/13/17 20:06  87  116/78    


 


6/13/17 19:30      Room Air  


 


6/13/17 19:20 98.2 87 16 116/78 (91) 93 Room Air  





 98.2       


 


6/13/17 11:06 97.6 85 20 130/84 (99)  Room Air  





 97.6       


 


6/13/17 09:27  98  148/103    


 


6/13/17 09:27  98  148/103    


 


6/13/17 08:00      Room Air  


 


6/13/17 07:57 98.1 90 20 148/103 (118) 94 Room Air  





 98.1       











Laboratory


Laboratory





Laboratory Tests








Test


  6/13/17


17:05 6/13/17


20:36 6/14/17


07:47 6/14/17


11:58


 


Glucose (Fingerstick)


  115 mg/dL


(70-99) 174 mg/dL


(70-99) 91 mg/dL


(70-99) 128 mg/dL


(70-99)











Medication


Medications





Current Medications


Glipizide (Glucotrol) 2.5 mg BIDWMEALS PO  Last administered on 6/14/17at 08:24

;  Start 6/13/17 at 17:00;  Stop 6/14/17 at 15:44;  Status DC





Comment


Review of Relevant


I have reviewed the following items nirmal (where applicable) has been applied.











LEO WESTON MD Jun 14, 2017 16:50

## 2017-06-14 NOTE — DS
DATE OF DISCHARGE:  06/14/2017



HOSPITAL SUMMARY:  The patient came in with chest pain and ongoing headaches. 

CT scan of the head x 2 and the brain MRI were normal as was the chest x-ray. 

For the headaches and temporal artery tenderness, he had a temporal artery

biopsy, which showed no sign of temporal arteritis.  The CBC was normal, sed

rate mildly elevated at 29, cardiac enzymes all showed no abnormalities and his

hemoglobin A1c was 6.4 with normal TSH and normal BMP and a low cholesterol of

1.09.  Troponin did go up to 4.9 and 6.2 and then declined after that.  Dr. Hankins performed a cardiac catheterization, which revealed a 70% mid stenosis

of the LAD, but no other significant abnormalities and he had a moderate aortic

insufficiency with no severe pressure changes.  Dr. Hankins did perform a

functional lability study, which was positive for the LAD.  He was not felt to

be a good surgical or interventional candidate.  Prednisone was given for the

possibility of temporal arteritis, but did not appear to improve his headaches

and prednisone was stopped when the biopsy was negative.  He continues to have

atrial fibrillation with intermittent fairly rapid ventricular response and has

been put back on sotalol along with the addition of diltiazem and he is

medically stable and able to be followed as an outpatient at this point.



FINAL DIAGNOSES:

1.  Non-ST elevation myocardial infarction.

2.  Coronary artery disease with 70% left anterior descending stenosis.

3.  Moderate aortic valve insufficiency for bilateral headaches, etiology

undetermined.

4.  Chronic atrial fibrillation with intermittent ventricular response.



OPERATIONS AND PROCEDURES:  Cardiac catheterization and flow study and temporal

artery biopsy.



COMPLICATIONS:  None.



CONSULTATIONS:  Dr. Hankins, Dr. Leiva, Dr. Wise, Dr. Lainez.



DISPOSITION:  I will add diltiazem 240 mg daily to the sotalol and rest of meds

remain the same.  He is not felt to be a good candidate for oral anticoagulation

as fall risk outweighs the benefit, though he does have appropriate need for

this given his chronic atrial fibrillation.  He will continue low dose aspirin

for antiplatelet therapy for his coronary artery disease and simple medical

followup at this point with ibuprofen as needed for headaches.

 



______________________________

DAPHNEY NAIR MD



DR:  LAURIE/rosina  JOB#:  [id number] / [id number]

DD:  06/14/2017 08:37  DT:  06/14/2017 19:03

## 2017-06-14 NOTE — DISCH
DISCHARGE INSTRUCTIONS


Condition on Discharge


Condition on Discharge:  Stable





Activity After Discharge


Activity Instructions for Disc:  No restrictions





Diet after Discharge


Diet after Discharge:  Diabetic No Calorie Level





Follow-Up


Follow up with:  as scheduled











DAPHNEY NAIR MD Jun 14, 2017 08:33

## 2017-06-28 ENCOUNTER — HOSPITAL ENCOUNTER (EMERGENCY)
Dept: HOSPITAL 61 - ER | Age: 67
Discharge: HOME | End: 2017-06-28
Payer: MEDICARE

## 2017-06-28 VITALS — BODY MASS INDEX: 34.82 KG/M2 | WEIGHT: 280 LBS | HEIGHT: 75 IN

## 2017-06-28 VITALS — SYSTOLIC BLOOD PRESSURE: 102 MMHG | DIASTOLIC BLOOD PRESSURE: 80 MMHG

## 2017-06-28 DIAGNOSIS — R06.02: ICD-10-CM

## 2017-06-28 DIAGNOSIS — R07.9: Primary | ICD-10-CM

## 2017-06-28 DIAGNOSIS — I48.91: ICD-10-CM

## 2017-06-28 DIAGNOSIS — E11.9: ICD-10-CM

## 2017-06-28 DIAGNOSIS — R74.8: ICD-10-CM

## 2017-06-28 DIAGNOSIS — I50.9: ICD-10-CM

## 2017-06-28 DIAGNOSIS — Z88.0: ICD-10-CM

## 2017-06-28 LAB
ALBUMIN SERPL-MCNC: 4.1 G/DL (ref 3.4–5)
ALP SERPL-CCNC: 88 U/L (ref 46–116)
ALT SERPL-CCNC: 21 U/L (ref 16–63)
ANION GAP SERPL CALC-SCNC: 2 MMOL/L (ref 6–14)
AST SERPL-CCNC: 15 U/L (ref 15–37)
BASOPHILS # BLD AUTO: 0 X10^3/UL (ref 0–0.2)
BASOPHILS NFR BLD: 1 % (ref 0–3)
BILIRUB DIRECT SERPL-MCNC: 0.2 MG/DL (ref 0–0.2)
BILIRUB SERPL-MCNC: 0.4 MG/DL (ref 0.2–1)
BUN SERPL-MCNC: 36 MG/DL (ref 8–26)
CALCIUM SERPL-MCNC: 9.8 MG/DL (ref 8.5–10.1)
CHLORIDE SERPL-SCNC: 101 MMOL/L (ref 98–107)
CK SERPL-CCNC: 41 U/L (ref 39–308)
CKMB MASS: 0.8 NG/ML (ref 0–3.6)
CO2 SERPL-SCNC: 36 MMOL/L (ref 21–32)
CREAT SERPL-MCNC: 1.4 MG/DL (ref 0.7–1.3)
EOSINOPHIL NFR BLD: 2 % (ref 0–3)
ERYTHROCYTE [DISTWIDTH] IN BLOOD BY AUTOMATED COUNT: 14.1 % (ref 11.5–14.5)
GFR SERPLBLD BASED ON 1.73 SQ M-ARVRAT: 50.4 ML/MIN
GLUCOSE SERPL-MCNC: 143 MG/DL (ref 70–99)
HCT VFR BLD CALC: 35.3 % (ref 39–53)
HGB BLD-MCNC: 12.2 G/DL (ref 13–17.5)
INR PPP: 1.1 (ref 0.8–1.1)
LYMPHOCYTES # BLD: 1.1 X10^3/UL (ref 1–4.8)
LYMPHOCYTES NFR BLD AUTO: 14 % (ref 24–48)
MAGNESIUM SERPL-MCNC: 1.9 MG/DL (ref 1.8–2.4)
MCH RBC QN AUTO: 29 PG (ref 25–35)
MCHC RBC AUTO-ENTMCNC: 34 G/DL (ref 31–37)
MCV RBC AUTO: 84 FL (ref 79–100)
MONOCYTES NFR BLD: 6 % (ref 0–9)
NEUTROPHILS NFR BLD AUTO: 77 % (ref 31–73)
PLATELET # BLD AUTO: 192 X10^3/UL (ref 140–400)
POTASSIUM SERPL-SCNC: 4.8 MMOL/L (ref 3.5–5.1)
PROT SERPL-MCNC: 7.8 G/DL (ref 6.4–8.2)
PROTHROMBIN TIME: 13.8 SEC (ref 11.7–14)
RBC # BLD AUTO: 4.21 X10^6/UL (ref 4.3–5.7)
SODIUM SERPL-SCNC: 139 MMOL/L (ref 136–145)
WBC # BLD AUTO: 7.8 X10^3/UL (ref 4–11)

## 2017-06-28 PROCEDURE — 83690 ASSAY OF LIPASE: CPT

## 2017-06-28 PROCEDURE — 85027 COMPLETE CBC AUTOMATED: CPT

## 2017-06-28 PROCEDURE — 36415 COLL VENOUS BLD VENIPUNCTURE: CPT

## 2017-06-28 PROCEDURE — 82553 CREATINE MB FRACTION: CPT

## 2017-06-28 PROCEDURE — 83880 ASSAY OF NATRIURETIC PEPTIDE: CPT

## 2017-06-28 PROCEDURE — 83735 ASSAY OF MAGNESIUM: CPT

## 2017-06-28 PROCEDURE — 85610 PROTHROMBIN TIME: CPT

## 2017-06-28 PROCEDURE — 71010: CPT

## 2017-06-28 PROCEDURE — 80076 HEPATIC FUNCTION PANEL: CPT

## 2017-06-28 PROCEDURE — 84484 ASSAY OF TROPONIN QUANT: CPT

## 2017-06-28 PROCEDURE — 84443 ASSAY THYROID STIM HORMONE: CPT

## 2017-06-28 PROCEDURE — 93005 ELECTROCARDIOGRAM TRACING: CPT

## 2017-06-28 PROCEDURE — 80048 BASIC METABOLIC PNL TOTAL CA: CPT

## 2017-06-28 NOTE — EKG
[facility]

               [address] [place] [address]

Test Date:    2017               Test Time:    11:02:22

Pat Name:     NIKOLAI GOODRICH         Department:   

Patient ID:   [id number]           Room:          

Gender:       M                        Technician:   

:          1950               Requested By: VINCE ENAMORADO

Order Number: [id number]            Reading MD:     

                                 Measurements

Intervals                              Axis          

Rate:         109                      P:            

ID:                                    QRS:          -99

QRSD:         124                      T:            -6

QT:           344                                    

QTc:          465                                    

                           Interpretive Statements

ATRIAL FIB./FLUTTER WITH RAPID VENTRICULAR RESPONSE

ABNORMAL RIGHT SUPERIOR AXIS DEVIATION

NON SPECIFIC INTRAVENTRICULAR DELAY

QRS(T) CONTOUR ABNORMALITY

CANNOT RULE OUT ANTEROSEPTAL MYOCARDIAL DAMAGE

CONSISTENT WITH INFERIOR INFARCT

AGE UNDETERMINED

RI6.01          Unconfirmed report

No previous ECG available for comparison

## 2017-06-28 NOTE — RAD
Indication: Shortness of breath.



Time of exam 11:19 AM



Correlation is made with prior study from 6/3/2017.



The heart is enlarged but stable. There is a calcified nodule in the right

lung base consistent with a granuloma. No infiltrate or failure is detected.

No effusion or pneumothorax is seen.



Impression: Stable chest. No acute abnormality is detected.

## 2017-06-28 NOTE — PHYS DOC
Past Medical History


Past Medical History:  A-Fib, CHF, Diabetes-Type II, Heart Disease


Additional Past Medical Histor:  AORTIC VALVE LEAK


Past Surgical History:  Tonsillectomy


Additional Past Surgical Histo:  CARDIAC CATH W NO STENT PER PATIENT


Alcohol Use:  None


Drug Use:  None





Adult General


Chief Complaint


Chief Complaint:  RAPID HEART RATE





[facility]


HPI





Patient is a 67  year old  male who presents with shortness of breath 

that started at 8 AM when he struck her make the bed. He gets very frustrated 

and his wife told him he had to stop and sit down and his symptoms resolved. 

Home health came out and saw that his heart rate is 123 and his blood pressure 

is 94/68 and call Dr. Mcneil of which she had an appointment with later 

today and told to come to the ER. Currently he denies any chest pain shortness 

of breath nausea vomiting.





Review of Systems


Review of Systems





Constitutional: Denies fever or chills []


Eyes: Denies change in visual acuity, redness, or eye pain []


HENT: Denies nasal congestion or sore throat []


Respiratory: Denies cough or shortness of breath []


Cardiovascular: No additional information not addressed in HPI []


GI: Denies abdominal pain, nausea, vomiting, bloody stools or diarrhea []


: Denies dysuria or hematuria []


Musculoskeletal: Denies back pain or joint pain []


Integument: Denies rash or skin lesions []


Neurologic: Denies headache, focal weakness or sensory changes []


Endocrine: Denies polyuria or polydipsia []





Allergies


Allergies





Allergies








Coded Allergies Type Severity Reaction Last Updated Verified


 


  Penicillins Allergy Severe RASH AND HIVES 17 Yes











Physical Exam


Physical Exam





Constitutional: Well developed, well nourished, no acute distress, non-toxic 

appearance. []


HENT: Normocephalic, atraumatic, bilateral external ears normal, oropharynx 

moist, no oral exudates, nose normal. []


Eyes: PERRLA, EOMI, conjunctiva normal, no discharge. [] 


Neck: Normal range of motion, no tenderness, supple, no stridor. [] 


Cardiovascular:Heart rate regular rhythm, no murmur []


Lungs & Thorax:  Bilateral breath sounds clear to auscultation []


Abdomen: Bowel sounds normal, soft, no tenderness, no masses, no pulsatile 

masses. [] 


Skin: Warm, dry, no erythema, no rash. [] 


Back: No tenderness, no CVA tenderness. [] 


Extremities: No tenderness, no cyanosis, no clubbing, ROM intact, no edema. [] 


Neurologic: Alert and oriented X 3, normal motor function, normal sensory 

function, no focal deficits noted. []


Psychologic: Affect normal, judgement normal, mood normal. []





Current Patient Data


Vital Signs





 Vital Signs








  Date Time  Temp Pulse Resp B/P (MAP) Pulse Ox O2 Delivery O2 Flow Rate FiO2


 


17 13:19  110 18 102/80 (87) 94   


 


17 11:55      Room Air  


 


17 10:55 97.7       





 97.7       








Lab Values





 Laboratory Tests








Test


  17


11:05


 


White Blood Count


  7.8 x10^3/uL


(4.0-11.0)


 


Red Blood Count


  4.21 x10^6/uL


(4.30-5.70)  L


 


Hemoglobin


  12.2 g/dL


(13.0-17.5)  L


 


Hematocrit


  35.3 %


(39.0-53.0)  L


 


Mean Corpuscular Volume


  84 fL ()


 


 


Mean Corpuscular Hemoglobin 29 pg (25-35)  


 


Mean Corpuscular Hemoglobin


Concent 34 g/dL


(31-37)


 


Red Cell Distribution Width


  14.1 %


(11.5-14.5)


 


Platelet Count


  192 x10^3/uL


(140-400)


 


Neutrophils (%) (Auto) 77 % (31-73)  H


 


Lymphocytes (%) (Auto) 14 % (24-48)  L


 


Monocytes (%) (Auto) 6 % (0-9)  


 


Eosinophils (%) (Auto) 2 % (0-3)  


 


Basophils (%) (Auto) 1 % (0-3)  


 


Neutrophils # (Auto)


  6.0 x10^3uL


(1.8-7.7)


 


Lymphocytes # (Auto)


  1.1 x10^3/uL


(1.0-4.8)


 


Monocytes # (Auto)


  0.5 x10^3/uL


(0.0-1.1)


 


Eosinophils # (Auto)


  0.2 x10^3/uL


(0.0-0.7)


 


Basophils # (Auto)


  0.0 x10^3/uL


(0.0-0.2)


 


Prothrombin Time


  13.8 SEC


(11.7-14.0)


 


Prothrombin Time INR 1.1 (0.8-1.1)  


 


Sodium Level


  139 mmol/L


(136-145)


 


Potassium Level


  4.8 mmol/L


(3.5-5.1)


 


Chloride Level


  101 mmol/L


()


 


Carbon Dioxide Level


  36 mmol/L


(21-32)  H


 


Anion Gap 2 (6-14)  L


 


Blood Urea Nitrogen


  36 mg/dL


(8-26)  H


 


Creatinine


  1.4 mg/dL


(0.7-1.3)  H


 


Estimated GFR


(Cockcroft-Gault) 50.4  


 


 


Glucose Level


  143 mg/dL


(70-99)  H


 


Calcium Level


  9.8 mg/dL


(8.5-10.1)


 


Magnesium Level


  1.9 mg/dL


(1.8-2.4)


 


Total Bilirubin


  0.4 mg/dL


(0.2-1.0)


 


Direct Bilirubin


  0.2 mg/dL


(0.0-0.2)


 


Aspartate Amino Transferase


(AST) 15 U/L (15-37)


 


 


Alanine Aminotransferase (ALT)


  21 U/L (16-63)


 


 


Alkaline Phosphatase


  88 U/L


()


 


Creatine Kinase


  41 U/L


()


 


Creatine Kinase MB (Mass)


  0.8 ng/mL


(0.0-3.6)


 


Creatine Kinase MB Relative


Index  % (0-4)  


 


 


Troponin I Quantitative


  < 0.017 ng/mL


(0.000-0.055)


 


NT-Pro-B-Type Natriuretic


Peptide 1828 pg/mL


(0-124)  H


 


Total Protein


  7.8 g/dL


(6.4-8.2)


 


Albumin


  4.1 g/dL


(3.4-5.0)


 


Lipase


  96 U/L


()


 


Thyroid Stimulating Hormone


(TSH) 2.347 uIU/mL


(0.358-3.74)





 Laboratory Tests


17 11:05








 Laboratory Tests


17 11:05











EKG


EKG


EKG shows A. fib/flutter with a rate of 109 bpm without any ST elevations, QTC 

465 ms, as interpreted by me.





Radiology/Procedures


Radiology/Procedures


 [facility]


 [address]   [id number] [phone]


 


 IMAGING REPORT





 Signed





PATIENT: NIKOLAI GOODRICH ACCOUNT: [id number] MRN#: [id number]


: 1950 LOCATION: ER AGE: 67


SEX: M EXAM DT: 17 ACCESSION#: [id number]


STATUS: REG ER ORD. PHYSICIAN: VINCE ENAMORADO MD 


REASON: SHORTNESS OF AIR


PROCEDURE: PORTABLE CHEST 1V





Indication: Shortness of breath.





Time of exam 11:19 AM





Correlation is made with prior study from 6/3/2017.





The heart is enlarged but stable. There is a calcified nodule in the right


lung base consistent with a granuloma. No infiltrate or failure is detected.


No effusion or pneumothorax is seen.





Impression: Stable chest. No acute abnormality is detected.














DICTATED and SIGNED BY:     LETICIA HUTSON MD


DATE:     17 1125





CC: VINCE ENAMORADO MD; DAPHNEY NAIR MD ~


Impressions:


Shortness of breath-resolved





Course & Med Decision Making


Course & Med Decision Making


Pertinent Labs and Imaging studies reviewed. (See chart for details)





Patient is EKG, labs do not show any acute abnormality other than slightly 

elevated creatinine. Spoke with Dr. Mcneil whose okay with this patient 

going home, ran the labs by him his physical exam, his EKG and vitals. Patient'

s heart rates in the 120s or less and Dr. Mcneil is fine with his heart rate 

is less than 110. Currently it's 100. Patient feels completely normal at this 

time and he's been informed of his elevated creatinine and the need to follow-

up with his primary care physician within the next few days. Return precautions 

given his agreeable Plan B discharged in stable condition.





Dragon Disclaimer


Dragon Disclaimer


This electronic medical record was generated, in whole or in part, using a 

voice recognition dictation system.





Departure


Departure


Impression:  


 Primary Impression:  


 Chest pain


Disposition:   HOME, SELF-CARE


Condition:  STABLE


Referrals:  


DAPHNEY NAIR MD (PCP)


Patient Instructions:  Shortness of Breath





Additional Instructions:  


You were seen today for your  trouble breathing have resolved. Your labs showed 

that your kidney function is a little worse today than it was previously. You 

will need to follow-up with your primary care physician had these labs 

repeated. I spoke with Dr. Mcneil who is okay with you being discharged 

home. He start having additional troubles breathing, chest pain or other 

concerns please return back to emergency department.





Problem Qualifiers








 Primary Impression:  


 Chest pain


 Chest pain type:  unspecified  Qualified Codes:  R07.9 - Chest pain, 

unspecified








VINCE ENAMORADO MD 2017 10:55

## 2017-07-08 ENCOUNTER — HOSPITAL ENCOUNTER (INPATIENT)
Dept: HOSPITAL 61 - ER | Age: 67
LOS: 3 days | Discharge: HOME HEALTH | DRG: 309 | End: 2017-07-11
Attending: FAMILY MEDICINE | Admitting: FAMILY MEDICINE
Payer: MEDICARE

## 2017-07-08 VITALS — DIASTOLIC BLOOD PRESSURE: 73 MMHG | SYSTOLIC BLOOD PRESSURE: 100 MMHG

## 2017-07-08 VITALS — SYSTOLIC BLOOD PRESSURE: 77 MMHG | DIASTOLIC BLOOD PRESSURE: 52 MMHG

## 2017-07-08 VITALS — SYSTOLIC BLOOD PRESSURE: 96 MMHG | DIASTOLIC BLOOD PRESSURE: 64 MMHG

## 2017-07-08 VITALS — DIASTOLIC BLOOD PRESSURE: 64 MMHG | SYSTOLIC BLOOD PRESSURE: 96 MMHG

## 2017-07-08 VITALS — SYSTOLIC BLOOD PRESSURE: 101 MMHG | DIASTOLIC BLOOD PRESSURE: 66 MMHG

## 2017-07-08 VITALS — BODY MASS INDEX: 34.96 KG/M2 | HEIGHT: 75 IN | WEIGHT: 281.13 LBS

## 2017-07-08 VITALS — DIASTOLIC BLOOD PRESSURE: 65 MMHG | SYSTOLIC BLOOD PRESSURE: 104 MMHG

## 2017-07-08 VITALS — DIASTOLIC BLOOD PRESSURE: 62 MMHG | SYSTOLIC BLOOD PRESSURE: 91 MMHG

## 2017-07-08 VITALS — DIASTOLIC BLOOD PRESSURE: 62 MMHG | SYSTOLIC BLOOD PRESSURE: 105 MMHG

## 2017-07-08 VITALS — SYSTOLIC BLOOD PRESSURE: 105 MMHG | DIASTOLIC BLOOD PRESSURE: 66 MMHG

## 2017-07-08 VITALS — SYSTOLIC BLOOD PRESSURE: 116 MMHG | DIASTOLIC BLOOD PRESSURE: 71 MMHG

## 2017-07-08 VITALS — SYSTOLIC BLOOD PRESSURE: 104 MMHG | DIASTOLIC BLOOD PRESSURE: 66 MMHG

## 2017-07-08 VITALS — SYSTOLIC BLOOD PRESSURE: 97 MMHG | DIASTOLIC BLOOD PRESSURE: 73 MMHG

## 2017-07-08 VITALS — SYSTOLIC BLOOD PRESSURE: 70 MMHG | DIASTOLIC BLOOD PRESSURE: 52 MMHG

## 2017-07-08 DIAGNOSIS — Z82.49: ICD-10-CM

## 2017-07-08 DIAGNOSIS — N18.3: ICD-10-CM

## 2017-07-08 DIAGNOSIS — M06.9: ICD-10-CM

## 2017-07-08 DIAGNOSIS — G43.909: ICD-10-CM

## 2017-07-08 DIAGNOSIS — J44.9: ICD-10-CM

## 2017-07-08 DIAGNOSIS — E11.22: ICD-10-CM

## 2017-07-08 DIAGNOSIS — M19.90: ICD-10-CM

## 2017-07-08 DIAGNOSIS — E78.5: ICD-10-CM

## 2017-07-08 DIAGNOSIS — G31.84: ICD-10-CM

## 2017-07-08 DIAGNOSIS — I50.9: ICD-10-CM

## 2017-07-08 DIAGNOSIS — I95.9: ICD-10-CM

## 2017-07-08 DIAGNOSIS — E11.42: ICD-10-CM

## 2017-07-08 DIAGNOSIS — I35.1: ICD-10-CM

## 2017-07-08 DIAGNOSIS — E11.51: ICD-10-CM

## 2017-07-08 DIAGNOSIS — Z88.0: ICD-10-CM

## 2017-07-08 DIAGNOSIS — I13.0: ICD-10-CM

## 2017-07-08 DIAGNOSIS — Z96.652: ICD-10-CM

## 2017-07-08 DIAGNOSIS — I47.2: Primary | ICD-10-CM

## 2017-07-08 DIAGNOSIS — Z87.11: ICD-10-CM

## 2017-07-08 DIAGNOSIS — I48.91: ICD-10-CM

## 2017-07-08 DIAGNOSIS — K21.9: ICD-10-CM

## 2017-07-08 DIAGNOSIS — Z87.891: ICD-10-CM

## 2017-07-08 DIAGNOSIS — I25.10: ICD-10-CM

## 2017-07-08 DIAGNOSIS — N40.1: ICD-10-CM

## 2017-07-08 DIAGNOSIS — E83.42: ICD-10-CM

## 2017-07-08 DIAGNOSIS — Z79.899: ICD-10-CM

## 2017-07-08 DIAGNOSIS — R33.8: ICD-10-CM

## 2017-07-08 LAB
ALBUMIN SERPL-MCNC: 4.1 G/DL (ref 3.4–5)
ALBUMIN/GLOB SERPL: 1.1 {RATIO} (ref 1–1.7)
ALP SERPL-CCNC: 95 U/L (ref 46–116)
ALT SERPL-CCNC: 21 U/L (ref 16–63)
ANION GAP SERPL CALC-SCNC: 9 MMOL/L (ref 6–14)
APTT BLD: 27 SEC (ref 24–38)
AST SERPL-CCNC: 19 U/L (ref 15–37)
BASOPHILS # BLD AUTO: 0.1 X10^3/UL (ref 0–0.2)
BASOPHILS NFR BLD: 1 % (ref 0–3)
BILIRUB SERPL-MCNC: 0.5 MG/DL (ref 0.2–1)
BUN SERPL-MCNC: 38 MG/DL (ref 8–26)
BUN/CREAT SERPL: 25 (ref 6–20)
CALCIUM SERPL-MCNC: 8.9 MG/DL (ref 8.5–10.1)
CHLORIDE SERPL-SCNC: 101 MMOL/L (ref 98–107)
CK SERPL-CCNC: 46 U/L (ref 39–308)
CO2 BLD-SCNC: 31 MMOL/L (ref 23–32)
CO2 SERPL-SCNC: 32 MMOL/L (ref 21–32)
CREAT SERPL-MCNC: 1.5 MG/DL (ref 0.7–1.3)
EOSINOPHIL NFR BLD: 4 % (ref 0–3)
ERYTHROCYTE [DISTWIDTH] IN BLOOD BY AUTOMATED COUNT: 14.3 % (ref 11.5–14.5)
GFR SERPLBLD BASED ON 1.73 SQ M-ARVRAT: 46.5 ML/MIN
GLOBULIN SER-MCNC: 3.6 G/DL (ref 2.2–3.8)
GLUCOSE BLD-MCNC: 207 MG/DL (ref 70–99)
GLUCOSE SERPL-MCNC: 210 MG/DL (ref 70–99)
HCT VFR BLD AUTO: 38 % (ref 37–52)
HCT VFR BLD CALC: 38.3 % (ref 39–53)
HGB BLD-MCNC: 12.6 G/DL (ref 13–17.5)
INR PPP: 1.1 (ref 0.8–1.1)
LYMPHOCYTES # BLD: 1.3 X10^3/UL (ref 1–4.8)
LYMPHOCYTES NFR BLD AUTO: 20 % (ref 24–48)
MAGNESIUM SERPL-MCNC: 1.7 MG/DL (ref 1.8–2.4)
MCH RBC QN AUTO: 28 PG (ref 25–35)
MCHC RBC AUTO-ENTMCNC: 33 G/DL (ref 31–37)
MCV RBC AUTO: 85 FL (ref 79–100)
MONOCYTES NFR BLD: 6 % (ref 0–9)
NEUTROPHILS NFR BLD AUTO: 69 % (ref 31–73)
PLATELET # BLD AUTO: 190 X10^3/UL (ref 140–400)
POTASSIUM BLD-SCNC: 5 MMOL/L (ref 3.5–5)
POTASSIUM SERPL-SCNC: 4.3 MMOL/L (ref 3.5–5.1)
PROT SERPL-MCNC: 7.7 G/DL (ref 6.4–8.2)
PROTHROMBIN TIME: 13.9 SEC (ref 11.7–14)
RBC # BLD AUTO: 4.49 X10^6/UL (ref 4.3–5.7)
SODIUM SERPL-SCNC: 136 MMOL/L (ref 135–145)
SODIUM SERPL-SCNC: 142 MMOL/L (ref 136–145)
WBC # BLD AUTO: 6.2 X10^3/UL (ref 4–11)

## 2017-07-08 PROCEDURE — C1887 CATHETER, GUIDING: HCPCS

## 2017-07-08 PROCEDURE — 36415 COLL VENOUS BLD VENIPUNCTURE: CPT

## 2017-07-08 PROCEDURE — 96375 TX/PRO/DX INJ NEW DRUG ADDON: CPT

## 2017-07-08 PROCEDURE — 85610 PROTHROMBIN TIME: CPT

## 2017-07-08 PROCEDURE — 85027 COMPLETE CBC AUTOMATED: CPT

## 2017-07-08 PROCEDURE — 71010: CPT

## 2017-07-08 PROCEDURE — 83880 ASSAY OF NATRIURETIC PEPTIDE: CPT

## 2017-07-08 PROCEDURE — 85730 THROMBOPLASTIN TIME PARTIAL: CPT

## 2017-07-08 PROCEDURE — 84443 ASSAY THYROID STIM HORMONE: CPT

## 2017-07-08 PROCEDURE — 84484 ASSAY OF TROPONIN QUANT: CPT

## 2017-07-08 PROCEDURE — 80048 BASIC METABOLIC PNL TOTAL CA: CPT

## 2017-07-08 PROCEDURE — 93005 ELECTROCARDIOGRAM TRACING: CPT

## 2017-07-08 PROCEDURE — 82962 GLUCOSE BLOOD TEST: CPT

## 2017-07-08 PROCEDURE — 80053 COMPREHEN METABOLIC PANEL: CPT

## 2017-07-08 PROCEDURE — A6539 GC STOCKING WAISTLNGTH 18-30: HCPCS

## 2017-07-08 PROCEDURE — 87641 MR-STAPH DNA AMP PROBE: CPT

## 2017-07-08 PROCEDURE — 96361 HYDRATE IV INFUSION ADD-ON: CPT

## 2017-07-08 PROCEDURE — 80047 BASIC METABLC PNL IONIZED CA: CPT

## 2017-07-08 PROCEDURE — 82550 ASSAY OF CK (CPK): CPT

## 2017-07-08 PROCEDURE — 83036 HEMOGLOBIN GLYCOSYLATED A1C: CPT

## 2017-07-08 PROCEDURE — 83735 ASSAY OF MAGNESIUM: CPT

## 2017-07-08 PROCEDURE — 96374 THER/PROPH/DIAG INJ IV PUSH: CPT

## 2017-07-08 RX ADMIN — INSULIN ASPART SCH UNITS: 100 INJECTION, SOLUTION INTRAVENOUS; SUBCUTANEOUS at 16:33

## 2017-07-08 RX ADMIN — PANTOPRAZOLE SODIUM SCH MG: 40 TABLET, DELAYED RELEASE ORAL at 16:33

## 2017-07-08 RX ADMIN — IBUPROFEN PRN MG: 400 TABLET ORAL at 20:32

## 2017-07-08 RX ADMIN — SERTRALINE HYDROCHLORIDE SCH MG: 50 TABLET ORAL at 14:00

## 2017-07-08 RX ADMIN — AMIODARONE HYDROCHLORIDE SCH MG: 200 TABLET ORAL at 17:55

## 2017-07-08 RX ADMIN — SOTALOL HYDROCHLORIDE SCH MG: 80 TABLET ORAL at 20:22

## 2017-07-08 RX ADMIN — ACETAMINOPHEN, ASPIRIN AND CAFFEINE SCH TAB: 250; 250; 65 TABLET, FILM COATED ORAL at 20:32

## 2017-07-08 RX ADMIN — POTASSIUM CHLORIDE SCH MEQ: 1500 TABLET, EXTENDED RELEASE ORAL at 16:33

## 2017-07-08 RX ADMIN — FUROSEMIDE SCH MG: 80 TABLET ORAL at 14:00

## 2017-07-08 RX ADMIN — SOTALOL HYDROCHLORIDE SCH MG: 80 TABLET ORAL at 14:00

## 2017-07-08 RX ADMIN — INSULIN ASPART SCH UNITS: 100 INJECTION, SOLUTION INTRAVENOUS; SUBCUTANEOUS at 14:27

## 2017-07-08 RX ADMIN — ATORVASTATIN CALCIUM SCH MG: 40 TABLET, FILM COATED ORAL at 20:32

## 2017-07-08 NOTE — PDOC1
History and Physical


Date of Admission


Date of Admission


07/08/17





Identification/Chief Complaint


Chief Complaint


dizziness, presyncope


Problems:  





Source


Source:  Chart review, Patient





History of Present Illness


History of Present Illness


   


Patient is a 67  year old male presenting to the emergency department by 

ambulance.  Patient had been feeling poorly all morning long with dizziness 

shortness of breath nausea and fatigue, he had a presyncopal episode and EMS 

was called,  Per EMS he was having rapid atrial flutter and runs of V. tach he 

was cardioverted on the scene,  He was having episodes of hypotension in the 50-

90 systolic range and was passing out and shortly after cardioverting him he 

woke up and he was back to normal and now he says he feels at his baseline.  

Patient denies any pain or shortness of breath and his blood pressure is 

improved in the 110 systolic range.











Past Medical History


Cardiovascular:  AFIB, CAD, CHF, HTN, MI, Hyperlipidemia, Valve insufficiency (

aortic and mitral)


Pulmonary:  Bronchitis, COPD


CENTRAL NERVOUS SYSTEM:  Periperal neuropathy


GI:  GERD


Heme/Onc:  Anemia NOS


Psych:  Depression


Rheumatologic:  Rheumatoid arthritis, Other (osteoarthritis)


Infectious disease:  Other (hx sepsis 3/14)


Renal/:  Benign prostatic enlarg.


Endocrine:  Diabetes





Past Surgical History


Past Surgical History:  Appendectomy, Total knee replacement (left), 

Tonsillectomy





Family History


Family History:  Coronary Artery Disease, Hypertension, Obesity





Social History


Smoke:  Quit (1988)


ALCOHOL:  rare


Drugs:  None





Current Problem List


Problem List


Problems


Medical Problems:


(1) Elevated brain natriuretic peptide (BNP) level


Status: Acute  





(2) Hypomagnesemia


Status: Acute  





(3) Hypotension


Status: Acute  





(4) V-tach


Status: Acute  











Current Medications


Current Medications





Current Medications








 Medications


  (Trade)  Dose


 Ordered  Sig/Shelby  Start Time


 Stop Time Status Last Admin


Dose Admin


 


 Amiodarone HCl


 150 mg/Dextrose  103 ml @ 


 618 mls/hr  1X  ONCE  7/8/17 10:30


 7/8/17 10:39 DC 7/8/17 10:56


618 MLS/HR


 


 Amiodarone HCl


 900 mg/Dextrose  518 ml @ 0


 mls/hr  CONT  PRN  7/8/17 10:30


 7/8/17 11:28 DC 7/8/17 11:27


33 MLS/HR


 


 Aspirin


  (Ecotrin)  325 mg  1X  ONCE  7/8/17 10:30


 7/8/17 10:31 DC 7/8/17 10:56


325 MG


 


 Fentanyl Citrate


  (Fentanyl 2ml


 Vial)  50 mcg  PRN Q2HR  PRN  7/8/17 11:00


 7/9/17 10:59   


 


 


 Magnesium Oxide


  (Magnesium Oxide)  800 mg  1X  STAT  7/8/17 11:10


 7/8/17 11:11 DC  


 


 


 Magnesium Sulfate/


 Dextrose  50 ml @ 25


 mls/hr  1X  ONCE  7/8/17 11:45


 7/8/17 13:44  7/8/17 11:58


25 MLS/HR


 


 Ondansetron HCl


  (Zofran)  4 mg  PRN Q8HRS  PRN  7/8/17 11:00


 7/9/17 10:59   


 


 


 Sodium Chloride  1,000 ml @ 


 1,000 mls/hr  1X  ONCE  7/8/17 11:00


 7/8/17 11:59 DC 7/8/17 11:00


1,000 MLS/HR











Allergies


Allergies





Allergies








Coded Allergies Type Severity Reaction Last Updated Verified


 


  Penicillins Allergy Severe RASH AND HIVES 6/8/17 Yes











ROS


Review of System


CONSTITUTIONAL:        No fever or chills


EYES:                          No recent changes


SKIN:               No rash or itching


CARDIOVASCULAR:     No chest pain,


RESPIRATORY:            No  cough


GASTROINTESTINAL:    No nausea, vomiting or abdominal pain


NEUROLOGICAL:          No headaches general fatigue earlier this morning


ENDOCRINE:               No cold or heat intolerance


GENITOURINARY:       + frequency of urination


MUSCULOSKELETAL:   Arthritis


LYMPHATICS:               No enlarged lymph nodes


PSYCHIATRIC:             History of depression





Physical Exam


Physical Exam


GEN.:    No apparent distress.  Alert and oriented.


HEENT:    Head is normocephalic, atraumatic, he does have dentures he was 

waiting for glue


NECK:    Supple.  


LUNGS:    Clear to auscultation.


HEART:    RRR, S1, S2 present.  Peripheral pulses intact


ABDOMEN:    Soft, nontender.  Positive bowel sounds.


EXTREMITIES:    Without any cyanosis.    


NEUROLOGIC:     Normal speech, normal tone


PSYCHIATRIC:    Normal affect, normal mood.


SKIN:   No ulcerations





Vitals


Vitals





Vital Signs








  Date Time  Temp Pulse Resp B/P (MAP) Pulse Ox O2 Delivery O2 Flow Rate FiO2


 


7/8/17 12:33  79 18 97/73 (81) 99 Nasal Cannula 2.0 


 


7/8/17 11:33 98.7       





 98.7       











Labs


Labs





Laboratory Tests








Test


  7/8/17


10:24 7/8/17


10:26


 


Bedside Troponin I


  0.00 ng/ml


(<0.08) 


 


 


White Blood Count


  


  6.2 x10^3/uL


(4.0-11.0)


 


Red Blood Count


  


  4.49 x10^6/uL


(4.30-5.70)


 


Hemoglobin


  


  12.6 g/dL


(13.0-17.5)


 


Bedside Hemoglobin


  


  12.9 g/dL


(14-18)


 


Hematocrit


  


  38.3 %


(39.0-53.0)


 


Bedside Hematocrit  38 % (37-52) 


 


Mean Corpuscular Volume  85 fL () 


 


Mean Corpuscular Hemoglobin  28 pg (25-35) 


 


Mean Corpuscular Hemoglobin


Concent 


  33 g/dL


(31-37)


 


Red Cell Distribution Width


  


  14.3 %


(11.5-14.5)


 


Platelet Count


  


  190 x10^3/uL


(140-400)


 


Neutrophils (%) (Auto)  69 % (31-73) 


 


Lymphocytes (%) (Auto)  20 % (24-48) 


 


Monocytes (%) (Auto)  6 % (0-9) 


 


Eosinophils (%) (Auto)  4 % (0-3) 


 


Basophils (%) (Auto)  1 % (0-3) 


 


Neutrophils # (Auto)


  


  4.3 x10^3uL


(1.8-7.7)


 


Lymphocytes # (Auto)


  


  1.3 x10^3/uL


(1.0-4.8)


 


Monocytes # (Auto)


  


  0.4 x10^3/uL


(0.0-1.1)


 


Eosinophils # (Auto)


  


  0.2 x10^3/uL


(0.0-0.7)


 


Basophils # (Auto)


  


  0.1 x10^3/uL


(0.0-0.2)


 


Bedside Sodium


  


  136 mmol/L


(135-145)


 


Sodium Level


  


  142 mmol/L


(136-145)


 


Bedside Potassium


  


  5.0 mmol/L


(3.5-5.0)


 


Potassium Level


  


  4.3 mmol/L


(3.5-5.1)


 


Bedside Chloride


  


  96 mmol/L


()


 


Chloride Level


  


  101 mmol/L


()


 


Carbon Dioxide Level


  


  32 mmol/L


(21-32)


 


Bedside Total CO2


  


  31 mmol/L


(23-32)


 


Anion Gap


  


  15 mmol/L


(6-14)


 


Bedside Blood Urea Nitrogen


  


  45 mg/dL


(8-26)


 


Blood Urea Nitrogen


  


  38 mg/dL


(8-26)


 


Creatinine


  


  1.5 mg/dL


(0.7-1.3)


 


Bedside Creatinine


  


  1.4 mg/dL


(0.5-1.4)


 


Estimated GFR


(Cockcroft-Gault) 


  46.5 


 


 


BUN/Creatinine Ratio  25 (6-20) 


 


Glucose Level


  


  207 mg/dL


(70-99)


 


Calcium Level


  


  8.9 mg/dL


(8.5-10.1)


 


Bedside Ionized Calcium (Maverick)


  


  1.08 mmol/L


(1.13-1.32)


 


Magnesium Level


  


  1.7 mg/dL


(1.8-2.4)


 


Total Bilirubin


  


  0.5 mg/dL


(0.2-1.0)


 


Aspartate Amino Transf


(AST/SGOT) 


  19 U/L (15-37) 


 


 


Alanine Aminotransferase


(ALT/SGPT) 


  21 U/L (16-63) 


 


 


Alkaline Phosphatase


  


  95 U/L


()


 


Creatine Kinase


  


  46 U/L


()


 


Troponin I Quantitative


  


  < 0.017 ng/mL


(0.000-0.055)


 


NT-Pro-B-Type Natriuretic


Peptide 


  1308 pg/mL


(0-124)


 


Total Protein


  


  7.7 g/dL


(6.4-8.2)


 


Albumin


  


  4.1 g/dL


(3.4-5.0)


 


Albumin/Globulin Ratio  1.1 (1.0-1.7) 


 


Thyroid Stimulating Hormone


(TSH) 


  3.048 uIU/mL


(0.358-3.74)








Laboratory Tests








Test


  7/8/17


10:24 7/8/17


10:26


 


Bedside Troponin I


  0.00 ng/ml


(<0.08) 


 


 


White Blood Count


  


  6.2 x10^3/uL


(4.0-11.0)


 


Red Blood Count


  


  4.49 x10^6/uL


(4.30-5.70)


 


Hemoglobin


  


  12.6 g/dL


(13.0-17.5)


 


Bedside Hemoglobin


  


  12.9 g/dL


(14-18)


 


Hematocrit


  


  38.3 %


(39.0-53.0)


 


Bedside Hematocrit  38 % (37-52) 


 


Mean Corpuscular Volume  85 fL () 


 


Mean Corpuscular Hemoglobin  28 pg (25-35) 


 


Mean Corpuscular Hemoglobin


Concent 


  33 g/dL


(31-37)


 


Red Cell Distribution Width


  


  14.3 %


(11.5-14.5)


 


Platelet Count


  


  190 x10^3/uL


(140-400)


 


Neutrophils (%) (Auto)  69 % (31-73) 


 


Lymphocytes (%) (Auto)  20 % (24-48) 


 


Monocytes (%) (Auto)  6 % (0-9) 


 


Eosinophils (%) (Auto)  4 % (0-3) 


 


Basophils (%) (Auto)  1 % (0-3) 


 


Neutrophils # (Auto)


  


  4.3 x10^3uL


(1.8-7.7)


 


Lymphocytes # (Auto)


  


  1.3 x10^3/uL


(1.0-4.8)


 


Monocytes # (Auto)


  


  0.4 x10^3/uL


(0.0-1.1)


 


Eosinophils # (Auto)


  


  0.2 x10^3/uL


(0.0-0.7)


 


Basophils # (Auto)


  


  0.1 x10^3/uL


(0.0-0.2)


 


Bedside Sodium


  


  136 mmol/L


(135-145)


 


Sodium Level


  


  142 mmol/L


(136-145)


 


Bedside Potassium


  


  5.0 mmol/L


(3.5-5.0)


 


Potassium Level


  


  4.3 mmol/L


(3.5-5.1)


 


Bedside Chloride


  


  96 mmol/L


()


 


Chloride Level


  


  101 mmol/L


()


 


Carbon Dioxide Level


  


  32 mmol/L


(21-32)


 


Bedside Total CO2


  


  31 mmol/L


(23-32)


 


Anion Gap


  


  15 mmol/L


(6-14)


 


Bedside Blood Urea Nitrogen


  


  45 mg/dL


(8-26)


 


Blood Urea Nitrogen


  


  38 mg/dL


(8-26)


 


Creatinine


  


  1.5 mg/dL


(0.7-1.3)


 


Bedside Creatinine


  


  1.4 mg/dL


(0.5-1.4)


 


Estimated GFR


(Cockcroft-Gault) 


  46.5 


 


 


BUN/Creatinine Ratio  25 (6-20) 


 


Glucose Level


  


  207 mg/dL


(70-99)


 


Calcium Level


  


  8.9 mg/dL


(8.5-10.1)


 


Bedside Ionized Calcium (Maverick)


  


  1.08 mmol/L


(1.13-1.32)


 


Magnesium Level


  


  1.7 mg/dL


(1.8-2.4)


 


Total Bilirubin


  


  0.5 mg/dL


(0.2-1.0)


 


Aspartate Amino Transf


(AST/SGOT) 


  19 U/L (15-37) 


 


 


Alanine Aminotransferase


(ALT/SGPT) 


  21 U/L (16-63) 


 


 


Alkaline Phosphatase


  


  95 U/L


()


 


Creatine Kinase


  


  46 U/L


()


 


Troponin I Quantitative


  


  < 0.017 ng/mL


(0.000-0.055)


 


NT-Pro-B-Type Natriuretic


Peptide 


  1308 pg/mL


(0-124)


 


Total Protein


  


  7.7 g/dL


(6.4-8.2)


 


Albumin


  


  4.1 g/dL


(3.4-5.0)


 


Albumin/Globulin Ratio  1.1 (1.0-1.7) 


 


Thyroid Stimulating Hormone


(TSH) 


  3.048 uIU/mL


(0.358-3.74)











VTE Prophylaxis Ordered


VTE Prophylaxis Devices:  Yes


VTE Pharmacological Prophylaxi:  Yes





Assessment/Plan


Assessment/Plan


1-V tach


2-aortic regurgitation


3-CAD


4-hx A ARELI Flannery MD Jul 8, 2017 13:30

## 2017-07-08 NOTE — EKG
[facility]

              [address] [place] [address]

Test Date:    2017               Test Time:    10:17:11

Pat Name:     NIKOLAI GOODRICH         Department:   

Patient ID:   [id number]           Room:          

Gender:       M                        Technician:   

:          1950               Requested By: DAPHNEY VAZQUEZ

Order Number: [id number]            Reading MD:     

                                 Measurements

Intervals                              Axis          

Rate:         88                       P:            54

WI:           180                      QRS:          -156

QRSD:         112                      T:            10

QT:           400                                    

QTc:          488                                    

                           Interpretive Statements

SINUS RHYTHM

ABNORMAL RIGHT SUPERIOR AXIS DEVIATION

R-S TRANSITION ZONE IN V LEADS DISPLACED TO THE LEFT

RIGHT VENTRICULAR HYPERTROPHY

QRS(T) CONTOUR ABNORMALITY

CONSIDER INFERIOR INFARCT

ST ABNORMALITY, POSSIBLE

LATERAL SUBENDOCARDIAL INJURY

RI6.01          Unconfirmed report

No previous ECG available for comparison

## 2017-07-08 NOTE — PDOC2
CONSULT


Date of Consult


Date of Consult


DATE: 7/8/17 


TIME: 18:37





Reason for Consult


Reason for Consult:


Ventricular tachycardia





Referring Physician


Referring Physician:


Dr. Ragland





Identification/Chief Complaint


Chief Complaint


Ventricular tachycardia


Problems:  





History of Present Illness


Reason for Visit:


This patient is a 67-year-old gentleman that has a known cardiac history with 

atrial fibrillation, coronary artery disease, previous episodes of CHF. 

Valvular heart disease with aortic insufficiency and mitral insufficiency.





He has extensive arthritis and has had previous surgeries for this.





He had been recently discharged from this institution and while at home he 

started having palpitations with lightheadedness and feeling sick 911 was 

called and the paramedics found him to be in sustained ventricular tachycardia.





The patient was shocked at home by the paramedics.





And immediately after started feeling better.





By the time he arrived in the emergency room his rhythm was controlled.





At the time that I examined the patient he was feeling comfortable and was in 

sinus rhythm.





Past Medical History


Cardiovascular:  AFIB, CAD, CHF, HTN, MI, Hyperlipidemia, Valve insufficiency (

aortic and mitral)


Pulmonary:  Bronchitis, COPD


CENTRAL NERVOUS SYSTEM:  Periperal neuropathy


GI:  GERD


Heme/Onc:  Anemia NOS


Psych:  Depression


Rheumatologic:  Rheumatoid arthritis, Other (osteoarthritis)


Infectious disease:  Other (hx sepsis 3/14)


Renal/:  Benign prostatic enlarg.


Endocrine:  Diabetes





Past Surgical History


Past Surgical History:  Appendectomy, Total knee replacement (left), 

Tonsillectomy





Family History


Family History:  Coronary Artery Disease, Hypertension, Obesity





Social History


Quit (1988)


ALCOHOL:  rare


Drugs:  None





Current Problem List


Problem List


Problems


Medical Problems:


(1) Elevated brain natriuretic peptide (BNP) level


Status: Acute  





(2) Hypomagnesemia


Status: Acute  





(3) Hypotension


Status: Acute  





(4) V-tach


Status: Acute  











Current Medications


Current Medications





Current Medications


Amiodarone HCl 150 mg/Dextrose 103 ml @  618 mls/hr 1X  ONCE IV  Last 

administered on 7/8/17at 10:56;  Start 7/8/17 at 10:30;  Stop 7/8/17 at 10:39;  

Status DC


Amiodarone HCl 900 mg/Dextrose 518 ml @ 0 mls/hr CONT  PRN IV SEE I/O RECORD 

Last administered on 7/8/17at 11:27;  Start 7/8/17 at 10:30;  Stop 7/8/17 at 11:

28;  Status DC


Aspirin (Ecotrin) 325 mg 1X  ONCE PO  Last administered on 7/8/17at 10:56;  

Start 7/8/17 at 10:30;  Stop 7/8/17 at 10:31;  Status DC


Sodium Chloride 1,000 ml @  1,000 mls/hr 1X  ONCE IV  Last administered on 7/8/ 17at 11:00;  Start 7/8/17 at 11:00;  Stop 7/8/17 at 11:59;  Status DC


Ondansetron HCl (Zofran) 4 mg PRN Q8HRS  PRN IV NAUSEA/VOMITING;  Start 7/8/17 

at 11:00;  Stop 7/9/17 at 10:59


Fentanyl Citrate (Fentanyl 2ml Vial) 50 mcg PRN Q2HR  PRN IV PAIN;  Start 7/8/ 17 at 11:00;  Stop 7/9/17 at 10:59


Magnesium Oxide (Magnesium Oxide) 800 mg 1X  STAT PO ;  Start 7/8/17 at 11:10;  

Stop 7/8/17 at 11:11;  Status DC


Magnesium Sulfate/ Dextrose 50 ml @ 25 mls/hr 1X  ONCE IV  Last administered on 

7/8/17at 11:58;  Start 7/8/17 at 11:45;  Stop 7/8/17 at 13:44;  Status DC


Atorvastatin Calcium (Lipitor) 40 mg HS PO ;  Start 7/8/17 at 21:00


Furosemide (Lasix) 80 mg DAILY PO ;  Start 7/8/17 at 14:00


Glipizide (Glucotrol) 2.5 mg BID PO ;  Start 7/8/17 at 21:00


Metformin HCl (Glucophage) 1,000 mg BIDWMEALS PO  Last administered on 7/8/17at 

16:33;  Start 7/8/17 at 17:00


Pantoprazole Sodium (Protonix) 40 mg BIDAC PO  Last administered on 7/8/17at 16:

33;  Start 7/8/17 at 16:30


Sertraline HCl (Zoloft) 50 mg DAILY PO ;  Start 7/8/17 at 14:00


Sotalol HCl (Betapace) 40 mg BID PO ;  Start 7/8/17 at 14:00


Acetaminophen/ Aspirin/Caffeine (Excedrin Migraine) 1 tab BID PO ;  Start 7/8/ 17 at 21:00


Potassium Chloride (Klor-Con) 20 meq BIDWMEALS PO ;  Start 7/8/17 at 17:00


Insulin Aspart (NovoLOG) 0-5 UNITS TIDWMEALS SQ  Last administered on 7/8/17at 

14:27;  Start 7/8/17 at 14:00


Dextrose (Dextrose 50%-Water Syringe) 12.5 gm PRN Q15MIN  PRN IV SEE COMMENTS;  

Start 7/8/17 at 13:45


Alprazolam (Xanax) 0.25 mg PRN Q8HRS  PRN PO ANXIETY / AGITATION;  Start 7/8/17 

at 13:45


Norepinephrine Bitartrate 250 ml @ 0 mls/hr CONT  PRN IV SEE I/O RECORD Last 

administered on 7/8/17at 15:18;  Start 7/8/17 at 15:15


Amiodarone HCl (Cordarone) 400 mg BID PO ;  Start 7/8/17 at 18:00





Active Scripts


Active


Pantoprazole Sodium 40 Mg Tablet.dr 40 Mg PO BIDAC


Reported


Stephanie Back & Body Caplet (Aspirin/Caffeine) 1 Each Tablet 2 Each PO BID


Zoloft (Sertraline Hcl) 50 Mg Tablet 1 Tab PO DAILY


Glipizide 5 Mg Tablet 0.5 Tab PO BID


Potassium Chloride 20 Meq Tablet.er 20 Meq PO BID


     last dose this am


     next dose with supper


Sotalol (Sotalol Hcl) 80 Mg Tablet 40 Mg PO BID


     last dose this am


     next dose tonight


Furosemide 80 Mg Tablet 80 Mg PO DAILY


     last dose this am


     next dose tomorrow


Atorvastatin Calcium 40 Mg Tablet 40 Mg PO HS


     last dose was last dose


     next dose tonight


Metformin Hcl 1,000 Mg Tablet 1,000 Mg PO BID


     last dose this am


     next dose with supper





Allergies


Allergies:  


Coded Allergies:  


     Penicillins (Verified  Allergy, Severe, RASH AND HIVES, 6/8/17)





Physical Exam


General:  Alert, Oriented X3, Cooperative


HEENT:  PERRLA


Lungs:  Clear to auscultation


Heart:  Regular rate, Normal S1, Normal S2, Other (no changes in the systolic 

and diastolic murmur)


Abdomen:  Normal bowel sounds, Soft


Extremities:  Other (1+ edema)





Vitals


VITALS





Vital Signs








  Date Time  Temp Pulse Resp B/P (MAP) Pulse Ox O2 Delivery O2 Flow Rate FiO2


 


7/8/17 17:33  71 19 105/62 (76) 98 Nasal Cannula 2.0 


 


7/8/17 16:33 98.7       





 98.7       











Labs


Labs





Laboratory Tests








Test


  7/8/17


10:24 7/8/17


10:26 7/8/17


14:09 7/8/17


16:20


 


Bedside Troponin I


  0.00 ng/ml


(<0.08) 


  


  


 


 


White Blood Count


  


  6.2 x10^3/uL


(4.0-11.0) 


  


 


 


Red Blood Count


  


  4.49 x10^6/uL


(4.30-5.70) 


  


 


 


Hemoglobin


  


  12.6 g/dL


(13.0-17.5) 


  


 


 


Bedside Hemoglobin


  


  12.9 g/dL


(14-18) 


  


 


 


Hematocrit


  


  38.3 %


(39.0-53.0) 


  


 


 


Bedside Hematocrit  38 % (37-52)   


 


Mean Corpuscular Volume  85 fL ()   


 


Mean Corpuscular Hemoglobin  28 pg (25-35)   


 


Mean Corpuscular Hemoglobin


Concent 


  33 g/dL


(31-37) 


  


 


 


Red Cell Distribution Width


  


  14.3 %


(11.5-14.5) 


  


 


 


Platelet Count


  


  190 x10^3/uL


(140-400) 


  


 


 


Neutrophils (%) (Auto)  69 % (31-73)   


 


Lymphocytes (%) (Auto)  20 % (24-48)   


 


Monocytes (%) (Auto)  6 % (0-9)   


 


Eosinophils (%) (Auto)  4 % (0-3)   


 


Basophils (%) (Auto)  1 % (0-3)   


 


Neutrophils # (Auto)


  


  4.3 x10^3uL


(1.8-7.7) 


  


 


 


Lymphocytes # (Auto)


  


  1.3 x10^3/uL


(1.0-4.8) 


  


 


 


Monocytes # (Auto)


  


  0.4 x10^3/uL


(0.0-1.1) 


  


 


 


Eosinophils # (Auto)


  


  0.2 x10^3/uL


(0.0-0.7) 


  


 


 


Basophils # (Auto)


  


  0.1 x10^3/uL


(0.0-0.2) 


  


 


 


Prothrombin Time


  


  13.9 SEC


(11.7-14.0) 


  


 


 


Prothromb Time International


Ratio 


  1.1 (0.8-1.1) 


  


  


 


 


Activated Partial


Thromboplast Time 


  27 SEC (24-38) 


  


  


 


 


Bedside Sodium


  


  136 mmol/L


(135-145) 


  


 


 


Sodium Level


  


  142 mmol/L


(136-145) 


  


 


 


Bedside Potassium


  


  5.0 mmol/L


(3.5-5.0) 


  


 


 


Potassium Level


  


  4.3 mmol/L


(3.5-5.1) 


  


 


 


Bedside Chloride


  


  96 mmol/L


() 


  


 


 


Chloride Level


  


  101 mmol/L


() 


  


 


 


Carbon Dioxide Level


  


  32 mmol/L


(21-32) 


  


 


 


Bedside Total CO2


  


  31 mmol/L


(23-32) 


  


 


 


Anion Gap


  


  15 mmol/L


(6-14) 


  


 


 


Bedside Blood Urea Nitrogen


  


  45 mg/dL


(8-26) 


  


 


 


Blood Urea Nitrogen


  


  38 mg/dL


(8-26) 


  


 


 


Creatinine


  


  1.5 mg/dL


(0.7-1.3) 


  


 


 


Bedside Creatinine


  


  1.4 mg/dL


(0.5-1.4) 


  


 


 


Estimated GFR


(Cockcroft-Gault) 


  46.5 


  


  


 


 


BUN/Creatinine Ratio  25 (6-20)   


 


Glucose Level


  


  207 mg/dL


(70-99) 


  


 


 


Calcium Level


  


  8.9 mg/dL


(8.5-10.1) 


  


 


 


Bedside Ionized Calcium (Maverick)


  


  1.08 mmol/L


(1.13-1.32) 


  


 


 


Magnesium Level


  


  1.7 mg/dL


(1.8-2.4) 


  


 


 


Total Bilirubin


  


  0.5 mg/dL


(0.2-1.0) 


  


 


 


Aspartate Amino Transf


(AST/SGOT) 


  19 U/L (15-37) 


  


  


 


 


Alanine Aminotransferase


(ALT/SGPT) 


  21 U/L (16-63) 


  


  


 


 


Alkaline Phosphatase


  


  95 U/L


() 


  


 


 


Creatine Kinase


  


  46 U/L


() 


  


 


 


Troponin I Quantitative


  


  < 0.017 ng/mL


(0.000-0.055) 


  


 


 


NT-Pro-B-Type Natriuretic


Peptide 


  1308 pg/mL


(0-124) 


  


 


 


Total Protein


  


  7.7 g/dL


(6.4-8.2) 


  


 


 


Albumin


  


  4.1 g/dL


(3.4-5.0) 


  


 


 


Albumin/Globulin Ratio  1.1 (1.0-1.7)   


 


Thyroid Stimulating Hormone


(TSH) 


  3.048 uIU/mL


(0.358-3.74) 


  


 


 


Glucose (Fingerstick)


  


  


  173 mg/dL


(70-99) 102 mg/dL


(70-99)


 


Test


  7/8/17


16:50 


  


  


 


 


Troponin I Quantitative


  0.095 ng/mL


(0.000-0.055) 


  


  


 








Laboratory Tests








Test


  7/8/17


10:24 7/8/17


10:26 7/8/17


14:09 7/8/17


16:20


 


Bedside Troponin I


  0.00 ng/ml


(<0.08) 


  


  


 


 


White Blood Count


  


  6.2 x10^3/uL


(4.0-11.0) 


  


 


 


Red Blood Count


  


  4.49 x10^6/uL


(4.30-5.70) 


  


 


 


Hemoglobin


  


  12.6 g/dL


(13.0-17.5) 


  


 


 


Bedside Hemoglobin


  


  12.9 g/dL


(14-18) 


  


 


 


Hematocrit


  


  38.3 %


(39.0-53.0) 


  


 


 


Bedside Hematocrit  38 % (37-52)   


 


Mean Corpuscular Volume  85 fL ()   


 


Mean Corpuscular Hemoglobin  28 pg (25-35)   


 


Mean Corpuscular Hemoglobin


Concent 


  33 g/dL


(31-37) 


  


 


 


Red Cell Distribution Width


  


  14.3 %


(11.5-14.5) 


  


 


 


Platelet Count


  


  190 x10^3/uL


(140-400) 


  


 


 


Neutrophils (%) (Auto)  69 % (31-73)   


 


Lymphocytes (%) (Auto)  20 % (24-48)   


 


Monocytes (%) (Auto)  6 % (0-9)   


 


Eosinophils (%) (Auto)  4 % (0-3)   


 


Basophils (%) (Auto)  1 % (0-3)   


 


Neutrophils # (Auto)


  


  4.3 x10^3uL


(1.8-7.7) 


  


 


 


Lymphocytes # (Auto)


  


  1.3 x10^3/uL


(1.0-4.8) 


  


 


 


Monocytes # (Auto)


  


  0.4 x10^3/uL


(0.0-1.1) 


  


 


 


Eosinophils # (Auto)


  


  0.2 x10^3/uL


(0.0-0.7) 


  


 


 


Basophils # (Auto)


  


  0.1 x10^3/uL


(0.0-0.2) 


  


 


 


Prothrombin Time


  


  13.9 SEC


(11.7-14.0) 


  


 


 


Prothromb Time International


Ratio 


  1.1 (0.8-1.1) 


  


  


 


 


Activated Partial


Thromboplast Time 


  27 SEC (24-38) 


  


  


 


 


Bedside Sodium


  


  136 mmol/L


(135-145) 


  


 


 


Sodium Level


  


  142 mmol/L


(136-145) 


  


 


 


Bedside Potassium


  


  5.0 mmol/L


(3.5-5.0) 


  


 


 


Potassium Level


  


  4.3 mmol/L


(3.5-5.1) 


  


 


 


Bedside Chloride


  


  96 mmol/L


() 


  


 


 


Chloride Level


  


  101 mmol/L


() 


  


 


 


Carbon Dioxide Level


  


  32 mmol/L


(21-32) 


  


 


 


Bedside Total CO2


  


  31 mmol/L


(23-32) 


  


 


 


Anion Gap


  


  15 mmol/L


(6-14) 


  


 


 


Bedside Blood Urea Nitrogen


  


  45 mg/dL


(8-26) 


  


 


 


Blood Urea Nitrogen


  


  38 mg/dL


(8-26) 


  


 


 


Creatinine


  


  1.5 mg/dL


(0.7-1.3) 


  


 


 


Bedside Creatinine


  


  1.4 mg/dL


(0.5-1.4) 


  


 


 


Estimated GFR


(Cockcroft-Gault) 


  46.5 


  


  


 


 


BUN/Creatinine Ratio  25 (6-20)   


 


Glucose Level


  


  207 mg/dL


(70-99) 


  


 


 


Calcium Level


  


  8.9 mg/dL


(8.5-10.1) 


  


 


 


Bedside Ionized Calcium (Maverick)


  


  1.08 mmol/L


(1.13-1.32) 


  


 


 


Magnesium Level


  


  1.7 mg/dL


(1.8-2.4) 


  


 


 


Total Bilirubin


  


  0.5 mg/dL


(0.2-1.0) 


  


 


 


Aspartate Amino Transf


(AST/SGOT) 


  19 U/L (15-37) 


  


  


 


 


Alanine Aminotransferase


(ALT/SGPT) 


  21 U/L (16-63) 


  


  


 


 


Alkaline Phosphatase


  


  95 U/L


() 


  


 


 


Creatine Kinase


  


  46 U/L


() 


  


 


 


Troponin I Quantitative


  


  < 0.017 ng/mL


(0.000-0.055) 


  


 


 


NT-Pro-B-Type Natriuretic


Peptide 


  1308 pg/mL


(0-124) 


  


 


 


Total Protein


  


  7.7 g/dL


(6.4-8.2) 


  


 


 


Albumin


  


  4.1 g/dL


(3.4-5.0) 


  


 


 


Albumin/Globulin Ratio  1.1 (1.0-1.7)   


 


Thyroid Stimulating Hormone


(TSH) 


  3.048 uIU/mL


(0.358-3.74) 


  


 


 


Glucose (Fingerstick)


  


  


  173 mg/dL


(70-99) 102 mg/dL


(70-99)


 


Test


  7/8/17


16:50 


  


  


 


 


Troponin I Quantitative


  0.095 ng/mL


(0.000-0.055) 


  


  


 











Assessment/Plan


Assessment/Plan


This patient with significant heart disease and a known history of valvular 

heart disease as well as rhythm disturbances came in this time after an episode 

of sustained ventricular tachycardia which required emergency cardioversion.





The patient has been started on IV amiodarone and I'm going to go ahead and 

advance him to by mouth amiodarone over the next 48 hours if stable by Monday 

the patient may be able to go home.





This patient has a rather poor condition and even though he can hydrated well I 

think that he is developing significant dementia.





Thank you very much for asking me to participate in the care of this patient











BRITTNEE AMAYA MD Jul 8, 2017 18:42

## 2017-07-08 NOTE — RAD
Examination: Single frontal view the chest. 



History: History of shortness of breath



Comparison: 6/20/2017



Findings:



Moderate cardiomegaly. There is no acute infiltrate or visualized

pneumothorax.



Impression



1. Moderate cardiomegaly.

## 2017-07-08 NOTE — EKG
[facility]

               [address] [place] [address]

Test Date:    2017               Test Time:    14:17:29

Pat Name:     NIKOLAI GOODRICH         Department:   

Patient ID:   [id number]           Room:         106 1

Gender:       M                        Technician:   

:          [id number]               Requested By: BRITTNEE AMAYA

Order Number: [id number]            Reading MD:     

                                 Measurements

Intervals                              Axis          

Rate:         69                       P:            50

ME:           186                      QRS:          -114

QRSD:         106                      T:            6

QT:           444                                    

QTc:          477                                    

                           Interpretive Statements

SINUS RHYTHM

ABNORMAL RIGHT SUPERIOR AXIS DEVIATION

R-S TRANSITION ZONE IN V LEADS DISPLACED TO THE LEFT

S1,S2,S3 PATTERN

LEFT ANTERIOR FASCICULAR BLOCK

PROLONGED QT

ABNORMAL ECG

RI6.01

Compared to ECG 2017 11:02:22

Left anterior fascicular block now present

Prolonged QT interval now present

Atrial flutter no longer present

Myocardial infarct finding no longer present

## 2017-07-09 VITALS — SYSTOLIC BLOOD PRESSURE: 108 MMHG | DIASTOLIC BLOOD PRESSURE: 74 MMHG

## 2017-07-09 VITALS — DIASTOLIC BLOOD PRESSURE: 61 MMHG | SYSTOLIC BLOOD PRESSURE: 97 MMHG

## 2017-07-09 VITALS — DIASTOLIC BLOOD PRESSURE: 64 MMHG | SYSTOLIC BLOOD PRESSURE: 103 MMHG

## 2017-07-09 VITALS — DIASTOLIC BLOOD PRESSURE: 60 MMHG | SYSTOLIC BLOOD PRESSURE: 102 MMHG

## 2017-07-09 VITALS — DIASTOLIC BLOOD PRESSURE: 74 MMHG | SYSTOLIC BLOOD PRESSURE: 116 MMHG

## 2017-07-09 VITALS — SYSTOLIC BLOOD PRESSURE: 103 MMHG | DIASTOLIC BLOOD PRESSURE: 70 MMHG

## 2017-07-09 VITALS — SYSTOLIC BLOOD PRESSURE: 104 MMHG | DIASTOLIC BLOOD PRESSURE: 65 MMHG

## 2017-07-09 VITALS — SYSTOLIC BLOOD PRESSURE: 117 MMHG | DIASTOLIC BLOOD PRESSURE: 78 MMHG

## 2017-07-09 VITALS — SYSTOLIC BLOOD PRESSURE: 105 MMHG | DIASTOLIC BLOOD PRESSURE: 67 MMHG

## 2017-07-09 VITALS — DIASTOLIC BLOOD PRESSURE: 71 MMHG | SYSTOLIC BLOOD PRESSURE: 105 MMHG

## 2017-07-09 VITALS — SYSTOLIC BLOOD PRESSURE: 104 MMHG | DIASTOLIC BLOOD PRESSURE: 67 MMHG

## 2017-07-09 VITALS — DIASTOLIC BLOOD PRESSURE: 69 MMHG | SYSTOLIC BLOOD PRESSURE: 111 MMHG

## 2017-07-09 VITALS — DIASTOLIC BLOOD PRESSURE: 77 MMHG | SYSTOLIC BLOOD PRESSURE: 123 MMHG

## 2017-07-09 VITALS — SYSTOLIC BLOOD PRESSURE: 119 MMHG | DIASTOLIC BLOOD PRESSURE: 76 MMHG

## 2017-07-09 VITALS — DIASTOLIC BLOOD PRESSURE: 66 MMHG | SYSTOLIC BLOOD PRESSURE: 108 MMHG

## 2017-07-09 LAB
ANION GAP SERPL CALC-SCNC: 9 MMOL/L (ref 6–14)
BASOPHILS # BLD AUTO: 0 X10^3/UL (ref 0–0.2)
BASOPHILS NFR BLD: 0 % (ref 0–3)
BUN SERPL-MCNC: 44 MG/DL (ref 8–26)
CALCIUM SERPL-MCNC: 8.6 MG/DL (ref 8.5–10.1)
CHLORIDE SERPL-SCNC: 100 MMOL/L (ref 98–107)
CO2 SERPL-SCNC: 28 MMOL/L (ref 21–32)
CREAT SERPL-MCNC: 2 MG/DL (ref 0.7–1.3)
EOSINOPHIL NFR BLD: 3 % (ref 0–3)
ERYTHROCYTE [DISTWIDTH] IN BLOOD BY AUTOMATED COUNT: 14.6 % (ref 11.5–14.5)
GFR SERPLBLD BASED ON 1.73 SQ M-ARVRAT: 33.4 ML/MIN
GLUCOSE SERPL-MCNC: 99 MG/DL (ref 70–99)
HCT VFR BLD CALC: 29.9 % (ref 39–53)
HGB BLD-MCNC: 10.2 G/DL (ref 13–17.5)
LYMPHOCYTES # BLD: 1.4 X10^3/UL (ref 1–4.8)
LYMPHOCYTES NFR BLD AUTO: 20 % (ref 24–48)
MCH RBC QN AUTO: 29 PG (ref 25–35)
MCHC RBC AUTO-ENTMCNC: 34 G/DL (ref 31–37)
MCV RBC AUTO: 85 FL (ref 79–100)
MONOCYTES NFR BLD: 8 % (ref 0–9)
NEUTROPHILS NFR BLD AUTO: 69 % (ref 31–73)
PLATELET # BLD AUTO: 145 X10^3/UL (ref 140–400)
POTASSIUM SERPL-SCNC: 4.1 MMOL/L (ref 3.5–5.1)
RBC # BLD AUTO: 3.53 X10^6/UL (ref 4.3–5.7)
SODIUM SERPL-SCNC: 137 MMOL/L (ref 136–145)
WBC # BLD AUTO: 6.9 X10^3/UL (ref 4–11)

## 2017-07-09 RX ADMIN — IBUPROFEN PRN MG: 400 TABLET ORAL at 10:47

## 2017-07-09 RX ADMIN — INSULIN ASPART SCH UNITS: 100 INJECTION, SOLUTION INTRAVENOUS; SUBCUTANEOUS at 11:21

## 2017-07-09 RX ADMIN — ACETAMINOPHEN, ASPIRIN AND CAFFEINE SCH TAB: 250; 250; 65 TABLET, FILM COATED ORAL at 20:46

## 2017-07-09 RX ADMIN — INSULIN ASPART SCH UNITS: 100 INJECTION, SOLUTION INTRAVENOUS; SUBCUTANEOUS at 17:00

## 2017-07-09 RX ADMIN — SOTALOL HYDROCHLORIDE SCH MG: 80 TABLET ORAL at 07:32

## 2017-07-09 RX ADMIN — AMIODARONE HYDROCHLORIDE SCH MG: 200 TABLET ORAL at 07:31

## 2017-07-09 RX ADMIN — AMIODARONE HYDROCHLORIDE SCH MG: 200 TABLET ORAL at 20:47

## 2017-07-09 RX ADMIN — PANTOPRAZOLE SODIUM SCH MG: 40 TABLET, DELAYED RELEASE ORAL at 17:53

## 2017-07-09 RX ADMIN — PANTOPRAZOLE SODIUM SCH MG: 40 TABLET, DELAYED RELEASE ORAL at 07:31

## 2017-07-09 RX ADMIN — FUROSEMIDE SCH MG: 80 TABLET ORAL at 07:30

## 2017-07-09 RX ADMIN — POTASSIUM CHLORIDE SCH MEQ: 1500 TABLET, EXTENDED RELEASE ORAL at 17:55

## 2017-07-09 RX ADMIN — SOTALOL HYDROCHLORIDE SCH MG: 80 TABLET ORAL at 20:47

## 2017-07-09 RX ADMIN — ACETAMINOPHEN, ASPIRIN AND CAFFEINE SCH TAB: 250; 250; 65 TABLET, FILM COATED ORAL at 07:31

## 2017-07-09 RX ADMIN — ATORVASTATIN CALCIUM SCH MG: 40 TABLET, FILM COATED ORAL at 20:46

## 2017-07-09 RX ADMIN — SERTRALINE HYDROCHLORIDE SCH MG: 50 TABLET ORAL at 07:30

## 2017-07-09 RX ADMIN — POTASSIUM CHLORIDE SCH MEQ: 1500 TABLET, EXTENDED RELEASE ORAL at 07:31

## 2017-07-09 RX ADMIN — INSULIN ASPART SCH UNITS: 100 INJECTION, SOLUTION INTRAVENOUS; SUBCUTANEOUS at 07:33

## 2017-07-09 NOTE — PDOC
SUBJECTIVE


Subjective


looks comfortable, denies chest pain





OBJECTIVE


Vital Signs





Vital Signs








  Date Time  Temp Pulse Resp B/P (MAP) Pulse Ox O2 Delivery O2 Flow Rate FiO2


 


7/9/17 11:00  61 16 104/67 (79) 99 Room Air  


 


7/9/17 10:00  62 17 102/60 (74) 97 Room Air  


 


7/9/17 09:00  66 17 103/70 (81) 96 Room Air  


 


7/9/17 08:00 99.0 66 18 116/74 (88) 99 Room Air  





 99.0       


 


7/9/17 08:00      Room Air  


 


7/9/17 07:32  62  111/75    


 


7/9/17 07:31  60  111/75    


 


7/9/17 07:00  61 16 117/78 (91) 98 Room Air  


 


7/9/17 06:00  61 16 108/66 (80) 98 Nasal Cannula 2.0 


 


7/9/17 05:00  61 17 119/76 (90) 100 Nasal Cannula 2.0 


 


7/9/17 04:00 98.5 66 20 105/71 (82) 98 Nasal Cannula 2.0 





 98.5       


 


7/9/17 04:00      Nasal Cannula 2.0 


 


7/9/17 03:00  67 19 103/64 (77) 99 Nasal Cannula 2.0 


 


7/9/17 02:00  64 21 105/67 (80) 98 Nasal Cannula 2.0 


 


7/9/17 01:00  61 20 104/65 (78) 98 Nasal Cannula 2.0 


 


7/9/17 00:00      Nasal Cannula 2.0 


 


7/9/17 00:00 98.6 63 19 97/61 (73) 98 Nasal Cannula 2.0 





 98.6       


 


7/8/17 23:00  65 26 101/66 (78) 98 Nasal Cannula 2.0 


 


7/8/17 22:00  68 20 104/65 (78) 97 Nasal Cannula 2.0 


 


7/8/17 21:00  65 18 116/71 (86) 99 Nasal Cannula 2.0 


 


7/8/17 20:22  64  104/66    


 


7/8/17 20:00 98.6 64 18 104/66 (79) 99 Nasal Cannula 2.0 





 98.6       


 


7/8/17 20:00      Nasal Cannula 2.0 


 


7/8/17 19:00  66 16 96/64 (75) 99 Nasal Cannula 2.0 


 


7/8/17 18:33  69 18 96/64 (75) 99 Nasal Cannula 2.0 


 


7/8/17 17:33  71 19 105/62 (76) 98 Nasal Cannula 2.0 


 


7/8/17 16:33 98.7 62 18 105/66 (79) 100 Nasal Cannula 2.0 





 98.7       


 


7/8/17 16:00      Nasal Cannula 2.0 


 


7/8/17 15:33  67 18 91/62 (72) 98 Nasal Cannula 2.0 


 


7/8/17 14:33  63 18 77/52 (60) 98 Nasal Cannula 2.0 


 


7/8/17 13:33  77 18 70/52 (58) 98 Nasal Cannula 2.0 


 


7/8/17 12:33  79 18 97/73 (81) 99 Nasal Cannula 2.0 


 


7/8/17 12:00      Nasal Cannula 2.0 








I & O











Intake and Output 


 


 7/9/17





 07:00


 


Intake Total 1430 ml


 


Output Total 220 ml


 


Balance 1210 ml


 


 


 


Intake Oral 1430 ml


 


Output Urine Total 220 ml











PHYSICAL EXAM


Physical Exam


no change





ASSESSMENT/PLAN


Assessment/Plan


1-V tach on Amiodarone no recurrence


2-aortic regurgitation


3-CAD


4-hx A fib


5- elevated troponin , now trending down 


Dr. Ragland will resume care in AM


Problems:  





COMMENT


Lab





Laboratory Tests








Test


  7/8/17


12:00 7/8/17


14:09 7/8/17


16:20 7/8/17


16:50


 


Nasal Screen MRSA (PCR)


  Negative


(Negative) 


  


  


 


 


Glucose (Fingerstick)


  


  173 mg/dL


(70-99) 102 mg/dL


(70-99) 


 


 


Troponin I Quantitative


  


  


  


  0.095 ng/mL


(0.000-0.055)


 


Test


  7/8/17


22:45 7/9/17


04:07 7/9/17


07:24 7/9/17


11:17


 


Troponin I Quantitative


  0.072 ng/mL


(0.000-0.055) 


  


  


 


 


White Blood Count


  


  6.9 x10^3/uL


(4.0-11.0) 


  


 


 


Red Blood Count


  


  3.53 x10^6/uL


(4.30-5.70) 


  


 


 


Hemoglobin


  


  10.2 g/dL


(13.0-17.5) 


  


 


 


Hematocrit


  


  29.9 %


(39.0-53.0) 


  


 


 


Mean Corpuscular Volume  85 fL ()   


 


Mean Corpuscular Hemoglobin  29 pg (25-35)   


 


Mean Corpuscular Hemoglobin


Concent 


  34 g/dL


(31-37) 


  


 


 


Red Cell Distribution Width


  


  14.6 %


(11.5-14.5) 


  


 


 


Platelet Count


  


  145 x10^3/uL


(140-400) 


  


 


 


Neutrophils (%) (Auto)  69 % (31-73)   


 


Lymphocytes (%) (Auto)  20 % (24-48)   


 


Monocytes (%) (Auto)  8 % (0-9)   


 


Eosinophils (%) (Auto)  3 % (0-3)   


 


Basophils (%) (Auto)  0 % (0-3)   


 


Neutrophils # (Auto)


  


  4.8 x10^3uL


(1.8-7.7) 


  


 


 


Lymphocytes # (Auto)


  


  1.4 x10^3/uL


(1.0-4.8) 


  


 


 


Monocytes # (Auto)


  


  0.5 x10^3/uL


(0.0-1.1) 


  


 


 


Eosinophils # (Auto)


  


  0.2 x10^3/uL


(0.0-0.7) 


  


 


 


Basophils # (Auto)


  


  0.0 x10^3/uL


(0.0-0.2) 


  


 


 


Sodium Level


  


  137 mmol/L


(136-145) 


  


 


 


Potassium Level


  


  4.1 mmol/L


(3.5-5.1) 


  


 


 


Chloride Level


  


  100 mmol/L


() 


  


 


 


Carbon Dioxide Level


  


  28 mmol/L


(21-32) 


  


 


 


Anion Gap  9 (6-14)   


 


Blood Urea Nitrogen


  


  44 mg/dL


(8-26) 


  


 


 


Creatinine


  


  2.0 mg/dL


(0.7-1.3) 


  


 


 


Estimated GFR


(Cockcroft-Gault) 


  33.4 


  


  


 


 


Glucose Level


  


  99 mg/dL


(70-99) 


  


 


 


Calcium Level


  


  8.6 mg/dL


(8.5-10.1) 


  


 


 


Glucose (Fingerstick)


  


  


  93 mg/dL


(70-99) 108 mg/dL


(70-99)

















ARELI WHEELER MD Jul 9, 2017 11:46

## 2017-07-09 NOTE — PDOC
PROGRESS NOTES


Subjective


Subjective


Continues to have headaches.


No chest pain or shortness of breath.





Objective


Objective





Vital Signs Remain in  sinus rhythm rate of 60/m no further episodes of 

ventricular tachycardia


Bloosd  pressure 110/70


Lungs are clear








  Date Time  Temp Pulse Resp B/P (MAP) Pulse Ox O2 Delivery O2 Flow Rate FiO2


 


7/9/17 11:00  61 16 104/67 (79) 99 Room Air  


 


7/9/17 08:00 99.0       





 99.0       


 


7/9/17 06:00       2.0 














Intake and Output 


 


 7/9/17





 07:00


 


Intake Total 1430 ml


 


Output Total 220 ml


 


Balance 1210 ml


 


 


 


Intake Oral 1430 ml


 


Output Urine Total 220 ml











Diagnosis


DIAGNOSIS


Ventricular tachycardia no recurrence with a higher dose of amiodarone


Relative bradycardia Will withhold Cardizem


Headaches that have been chronic and investigated in the past


History of aortic regurgitation


History of the coronary artery disease that has not been intervened.


Problems:  





Assessment


Assessment


Problems


Medical Problems:


(1) Elevated brain natriuretic peptide (BNP) level


Status: Acute  





(2) Hypomagnesemia


Status: Acute  





(3) Hypotension


Status: Acute  





(4) V-tach


Status: Acute  











Comment


Review of Relevant


I have reviewed the following items nirmal (where applicable) has been applied.


Labs





Laboratory Tests








Test


  7/8/17


10:24 7/8/17


10:26 7/8/17


12:00 7/8/17


14:09


 


Bedside Troponin I


  0.00 ng/ml


(<0.08) 


  


  


 


 


White Blood Count


  


  6.2 x10^3/uL


(4.0-11.0) 


  


 


 


Red Blood Count


  


  4.49 x10^6/uL


(4.30-5.70) 


  


 


 


Hemoglobin


  


  12.6 g/dL


(13.0-17.5) 


  


 


 


Bedside Hemoglobin


  


  12.9 g/dL


(14-18) 


  


 


 


Hematocrit


  


  38.3 %


(39.0-53.0) 


  


 


 


Bedside Hematocrit  38 % (37-52)   


 


Mean Corpuscular Volume  85 fL ()   


 


Mean Corpuscular Hemoglobin  28 pg (25-35)   


 


Mean Corpuscular Hemoglobin


Concent 


  33 g/dL


(31-37) 


  


 


 


Red Cell Distribution Width


  


  14.3 %


(11.5-14.5) 


  


 


 


Platelet Count


  


  190 x10^3/uL


(140-400) 


  


 


 


Neutrophils (%) (Auto)  69 % (31-73)   


 


Lymphocytes (%) (Auto)  20 % (24-48)   


 


Monocytes (%) (Auto)  6 % (0-9)   


 


Eosinophils (%) (Auto)  4 % (0-3)   


 


Basophils (%) (Auto)  1 % (0-3)   


 


Neutrophils # (Auto)


  


  4.3 x10^3uL


(1.8-7.7) 


  


 


 


Lymphocytes # (Auto)


  


  1.3 x10^3/uL


(1.0-4.8) 


  


 


 


Monocytes # (Auto)


  


  0.4 x10^3/uL


(0.0-1.1) 


  


 


 


Eosinophils # (Auto)


  


  0.2 x10^3/uL


(0.0-0.7) 


  


 


 


Basophils # (Auto)


  


  0.1 x10^3/uL


(0.0-0.2) 


  


 


 


Prothrombin Time


  


  13.9 SEC


(11.7-14.0) 


  


 


 


Prothromb Time International


Ratio 


  1.1 (0.8-1.1) 


  


  


 


 


Activated Partial


Thromboplast Time 


  27 SEC (24-38) 


  


  


 


 


Bedside Sodium


  


  136 mmol/L


(135-145) 


  


 


 


Sodium Level


  


  142 mmol/L


(136-145) 


  


 


 


Bedside Potassium


  


  5.0 mmol/L


(3.5-5.0) 


  


 


 


Potassium Level


  


  4.3 mmol/L


(3.5-5.1) 


  


 


 


Bedside Chloride


  


  96 mmol/L


() 


  


 


 


Chloride Level


  


  101 mmol/L


() 


  


 


 


Carbon Dioxide Level


  


  32 mmol/L


(21-32) 


  


 


 


Bedside Total CO2


  


  31 mmol/L


(23-32) 


  


 


 


Anion Gap


  


  15 mmol/L


(6-14) 


  


 


 


Bedside Blood Urea Nitrogen


  


  45 mg/dL


(8-26) 


  


 


 


Blood Urea Nitrogen


  


  38 mg/dL


(8-26) 


  


 


 


Creatinine


  


  1.5 mg/dL


(0.7-1.3) 


  


 


 


Bedside Creatinine


  


  1.4 mg/dL


(0.5-1.4) 


  


 


 


Estimated GFR


(Cockcroft-Gault) 


  46.5 


  


  


 


 


BUN/Creatinine Ratio  25 (6-20)   


 


Glucose Level


  


  207 mg/dL


(70-99) 


  


 


 


Calcium Level


  


  8.9 mg/dL


(8.5-10.1) 


  


 


 


Bedside Ionized Calcium (Maverick)


  


  1.08 mmol/L


(1.13-1.32) 


  


 


 


Magnesium Level


  


  1.7 mg/dL


(1.8-2.4) 


  


 


 


Total Bilirubin


  


  0.5 mg/dL


(0.2-1.0) 


  


 


 


Aspartate Amino Transf


(AST/SGOT) 


  19 U/L (15-37) 


  


  


 


 


Alanine Aminotransferase


(ALT/SGPT) 


  21 U/L (16-63) 


  


  


 


 


Alkaline Phosphatase


  


  95 U/L


() 


  


 


 


Creatine Kinase


  


  46 U/L


() 


  


 


 


Troponin I Quantitative


  


  < 0.017 ng/mL


(0.000-0.055) 


  


 


 


NT-Pro-B-Type Natriuretic


Peptide 


  1308 pg/mL


(0-124) 


  


 


 


Total Protein


  


  7.7 g/dL


(6.4-8.2) 


  


 


 


Albumin


  


  4.1 g/dL


(3.4-5.0) 


  


 


 


Albumin/Globulin Ratio  1.1 (1.0-1.7)   


 


Thyroid Stimulating Hormone


(TSH) 


  3.048 uIU/mL


(0.358-3.74) 


  


 


 


Nasal Screen MRSA (PCR)


  


  


  Negative


(Negative) 


 


 


Glucose (Fingerstick)


  


  


  


  173 mg/dL


(70-99)


 


Test


  7/8/17


16:20 7/8/17


16:50 7/8/17


22:45 7/9/17


04:07


 


Glucose (Fingerstick)


  102 mg/dL


(70-99) 


  


  


 


 


Troponin I Quantitative


  


  0.095 ng/mL


(0.000-0.055) 0.072 ng/mL


(0.000-0.055) 


 


 


White Blood Count


  


  


  


  6.9 x10^3/uL


(4.0-11.0)


 


Red Blood Count


  


  


  


  3.53 x10^6/uL


(4.30-5.70)


 


Hemoglobin


  


  


  


  10.2 g/dL


(13.0-17.5)


 


Hematocrit


  


  


  


  29.9 %


(39.0-53.0)


 


Mean Corpuscular Volume    85 fL () 


 


Mean Corpuscular Hemoglobin    29 pg (25-35) 


 


Mean Corpuscular Hemoglobin


Concent 


  


  


  34 g/dL


(31-37)


 


Red Cell Distribution Width


  


  


  


  14.6 %


(11.5-14.5)


 


Platelet Count


  


  


  


  145 x10^3/uL


(140-400)


 


Neutrophils (%) (Auto)    69 % (31-73) 


 


Lymphocytes (%) (Auto)    20 % (24-48) 


 


Monocytes (%) (Auto)    8 % (0-9) 


 


Eosinophils (%) (Auto)    3 % (0-3) 


 


Basophils (%) (Auto)    0 % (0-3) 


 


Neutrophils # (Auto)


  


  


  


  4.8 x10^3uL


(1.8-7.7)


 


Lymphocytes # (Auto)


  


  


  


  1.4 x10^3/uL


(1.0-4.8)


 


Monocytes # (Auto)


  


  


  


  0.5 x10^3/uL


(0.0-1.1)


 


Eosinophils # (Auto)


  


  


  


  0.2 x10^3/uL


(0.0-0.7)


 


Basophils # (Auto)


  


  


  


  0.0 x10^3/uL


(0.0-0.2)


 


Sodium Level


  


  


  


  137 mmol/L


(136-145)


 


Potassium Level


  


  


  


  4.1 mmol/L


(3.5-5.1)


 


Chloride Level


  


  


  


  100 mmol/L


()


 


Carbon Dioxide Level


  


  


  


  28 mmol/L


(21-32)


 


Anion Gap    9 (6-14) 


 


Blood Urea Nitrogen


  


  


  


  44 mg/dL


(8-26)


 


Creatinine


  


  


  


  2.0 mg/dL


(0.7-1.3)


 


Estimated GFR


(Cockcroft-Gault) 


  


  


  33.4 


 


 


Glucose Level


  


  


  


  99 mg/dL


(70-99)


 


Calcium Level


  


  


  


  8.6 mg/dL


(8.5-10.1)


 


Test


  7/9/17


07:24 7/9/17


11:17 


  


 


 


Glucose (Fingerstick)


  93 mg/dL


(70-99) 108 mg/dL


(70-99) 


  


 








Laboratory Tests








Test


  7/8/17


16:20 7/8/17


16:50 7/8/17


22:45 7/9/17


04:07


 


Glucose (Fingerstick)


  102 mg/dL


(70-99) 


  


  


 


 


Troponin I Quantitative


  


  0.095 ng/mL


(0.000-0.055) 0.072 ng/mL


(0.000-0.055) 


 


 


White Blood Count


  


  


  


  6.9 x10^3/uL


(4.0-11.0)


 


Red Blood Count


  


  


  


  3.53 x10^6/uL


(4.30-5.70)


 


Hemoglobin


  


  


  


  10.2 g/dL


(13.0-17.5)


 


Hematocrit


  


  


  


  29.9 %


(39.0-53.0)


 


Mean Corpuscular Volume    85 fL () 


 


Mean Corpuscular Hemoglobin    29 pg (25-35) 


 


Mean Corpuscular Hemoglobin


Concent 


  


  


  34 g/dL


(31-37)


 


Red Cell Distribution Width


  


  


  


  14.6 %


(11.5-14.5)


 


Platelet Count


  


  


  


  145 x10^3/uL


(140-400)


 


Neutrophils (%) (Auto)    69 % (31-73) 


 


Lymphocytes (%) (Auto)    20 % (24-48) 


 


Monocytes (%) (Auto)    8 % (0-9) 


 


Eosinophils (%) (Auto)    3 % (0-3) 


 


Basophils (%) (Auto)    0 % (0-3) 


 


Neutrophils # (Auto)


  


  


  


  4.8 x10^3uL


(1.8-7.7)


 


Lymphocytes # (Auto)


  


  


  


  1.4 x10^3/uL


(1.0-4.8)


 


Monocytes # (Auto)


  


  


  


  0.5 x10^3/uL


(0.0-1.1)


 


Eosinophils # (Auto)


  


  


  


  0.2 x10^3/uL


(0.0-0.7)


 


Basophils # (Auto)


  


  


  


  0.0 x10^3/uL


(0.0-0.2)


 


Sodium Level


  


  


  


  137 mmol/L


(136-145)


 


Potassium Level


  


  


  


  4.1 mmol/L


(3.5-5.1)


 


Chloride Level


  


  


  


  100 mmol/L


()


 


Carbon Dioxide Level


  


  


  


  28 mmol/L


(21-32)


 


Anion Gap    9 (6-14) 


 


Blood Urea Nitrogen


  


  


  


  44 mg/dL


(8-26)


 


Creatinine


  


  


  


  2.0 mg/dL


(0.7-1.3)


 


Estimated GFR


(Cockcroft-Gault) 


  


  


  33.4 


 


 


Glucose Level


  


  


  


  99 mg/dL


(70-99)


 


Calcium Level


  


  


  


  8.6 mg/dL


(8.5-10.1)


 


Test


  7/9/17


07:24 7/9/17


11:17 


  


 


 


Glucose (Fingerstick)


  93 mg/dL


(70-99) 108 mg/dL


(70-99) 


  


 








Medications





Current Medications


Amiodarone HCl 150 mg/Dextrose 103 ml @  618 mls/hr 1X  ONCE IV  Last 

administered on 7/8/17at 10:56;  Start 7/8/17 at 10:30;  Stop 7/8/17 at 10:39;  

Status DC


Amiodarone HCl 900 mg/Dextrose 518 ml @ 0 mls/hr CONT  PRN IV SEE I/O RECORD 

Last administered on 7/8/17at 11:27;  Start 7/8/17 at 10:30;  Stop 7/8/17 at 11:

28;  Status DC


Aspirin (Ecotrin) 325 mg 1X  ONCE PO  Last administered on 7/8/17at 10:56;  

Start 7/8/17 at 10:30;  Stop 7/8/17 at 10:31;  Status DC


Sodium Chloride 1,000 ml @  1,000 mls/hr 1X  ONCE IV  Last administered on 7/8/ 17at 11:00;  Start 7/8/17 at 11:00;  Stop 7/8/17 at 11:59;  Status DC


Ondansetron HCl (Zofran) 4 mg PRN Q8HRS  PRN IV NAUSEA/VOMITING;  Start 7/8/17 

at 11:00;  Stop 7/9/17 at 10:59;  Status DC


Fentanyl Citrate (Fentanyl 2ml Vial) 50 mcg PRN Q2HR  PRN IV PAIN;  Start 7/8/ 17 at 11:00;  Stop 7/9/17 at 10:59;  Status DC


Magnesium Oxide (Magnesium Oxide) 800 mg 1X  STAT PO ;  Start 7/8/17 at 11:10;  

Stop 7/8/17 at 11:11;  Status DC


Magnesium Sulfate/ Dextrose 50 ml @ 25 mls/hr 1X  ONCE IV  Last administered on 

7/8/17at 11:58;  Start 7/8/17 at 11:45;  Stop 7/8/17 at 13:44;  Status DC


Atorvastatin Calcium (Lipitor) 40 mg HS PO  Last administered on 7/8/17at 20:32

;  Start 7/8/17 at 21:00


Furosemide (Lasix) 80 mg DAILY PO  Last administered on 7/9/17at 07:30;  Start 7 /8/17 at 14:00


Glipizide (Glucotrol) 2.5 mg BID PO  Last administered on 7/9/17at 07:32;  

Start 7/8/17 at 21:00


Metformin HCl (Glucophage) 1,000 mg BIDWMEALS PO  Last administered on 7/9/17at 

07:30;  Start 7/8/17 at 17:00


Pantoprazole Sodium (Protonix) 40 mg BIDAC PO  Last administered on 7/9/17at 07:

31;  Start 7/8/17 at 16:30


Sertraline HCl (Zoloft) 50 mg DAILY PO  Last administered on 7/9/17at 07:30;  

Start 7/8/17 at 14:00


Sotalol HCl (Betapace) 40 mg BID PO  Last administered on 7/9/17at 07:32;  

Start 7/8/17 at 14:00


Acetaminophen/ Aspirin/Caffeine (Excedrin Migraine) 1 tab BID PO  Last 

administered on 7/9/17at 07:31;  Start 7/8/17 at 21:00


Potassium Chloride (Klor-Con) 20 meq BIDWMEALS PO  Last administered on 7/9/ 17at 07:31;  Start 7/8/17 at 17:00


Insulin Aspart (NovoLOG) 0-5 UNITS TIDWMEALS SQ  Last administered on 7/8/17at 

14:27;  Start 7/8/17 at 14:00


Dextrose (Dextrose 50%-Water Syringe) 12.5 gm PRN Q15MIN  PRN IV SEE COMMENTS;  

Start 7/8/17 at 13:45


Alprazolam (Xanax) 0.25 mg PRN Q8HRS  PRN PO ANXIETY / AGITATION;  Start 7/8/17 

at 13:45


Norepinephrine Bitartrate 250 ml @ 0 mls/hr CONT  PRN IV SEE I/O RECORD Last 

administered on 7/8/17at 15:18;  Start 7/8/17 at 15:15


Amiodarone HCl (Cordarone) 400 mg BID PO  Last administered on 7/9/17at 07:31;  

Start 7/8/17 at 18:00


Ibuprofen (Motrin) 400 mg PRN TID  PRN PO headache Last administered on 7/9/ 17at 10:47;  Start 7/8/17 at 20:15





Active Scripts


Active


Pantoprazole Sodium 40 Mg Tablet. 40 Mg PO BIDAC


Reported


Stephanie Back & Body Caplet (Aspirin/Caffeine) 1 Each Tablet 2 Each PO BID


Zoloft (Sertraline Hcl) 50 Mg Tablet 1 Tab PO DAILY


Glipizide 5 Mg Tablet 0.5 Tab PO BID


Potassium Chloride 20 Meq Tablet.er 20 Meq PO BID


     last dose this am


     next dose with supper


Sotalol (Sotalol Hcl) 80 Mg Tablet 40 Mg PO BID


     last dose this am


     next dose tonight


Furosemide 80 Mg Tablet 80 Mg PO DAILY


     last dose this am


     next dose tomorrow


Atorvastatin Calcium 40 Mg Tablet 40 Mg PO HS


     last dose was last dose


     next dose tonight


Metformin Hcl 1,000 Mg Tablet 1,000 Mg PO BID


     last dose this am


     next dose with supper


Vitals/I & O





Vital Sign - Last 24 Hours








 7/8/17 7/8/17 7/8/17 7/8/17





 15:33 16:00 16:33 17:33


 


Temp   98.7 





   98.7 


 


Pulse 67  62 71


 


Resp 18  18 19


 


B/P (MAP) 91/62 (72)  105/66 (79) 105/62 (76)


 


Pulse Ox 98  100 98


 


O2 Delivery Nasal Cannula Nasal Cannula Nasal Cannula Nasal Cannula


 


O2 Flow Rate 2.0 2.0 2.0 2.0


 


    





    





 7/8/17 7/8/17 7/8/17 7/8/17





 18:33 19:00 20:00 20:00


 


Temp    98.6





    98.6


 


Pulse 69 66  64


 


Resp 18 16  18


 


B/P (MAP) 96/64 (75) 96/64 (75)  104/66 (79)


 


Pulse Ox 99 99  99


 


O2 Delivery Nasal Cannula Nasal Cannula Nasal Cannula Nasal Cannula


 


O2 Flow Rate 2.0 2.0 2.0 2.0


 


    





    





 7/8/17 7/8/17 7/8/17 7/8/17





 20:22 21:00 22:00 23:00


 


Pulse 64 65 68 65


 


Resp  18 20 26


 


B/P (MAP) 104/66 116/71 (86) 104/65 (78) 101/66 (78)


 


Pulse Ox  99 97 98


 


O2 Delivery  Nasal Cannula Nasal Cannula Nasal Cannula


 


O2 Flow Rate  2.0 2.0 2.0





 7/9/17 7/9/17 7/9/17 7/9/17





 00:00 00:00 01:00 02:00


 


Temp 98.6   





 98.6   


 


Pulse 63  61 64


 


Resp 19  20 21


 


B/P (MAP) 97/61 (73)  104/65 (78) 105/67 (80)


 


Pulse Ox 98  98 98


 


O2 Delivery Nasal Cannula Nasal Cannula Nasal Cannula Nasal Cannula


 


O2 Flow Rate 2.0 2.0 2.0 2.0


 


    





    





 7/9/17 7/9/17 7/9/17 7/9/17





 03:00 04:00 04:00 05:00


 


Temp   98.5 





   98.5 


 


Pulse 67  66 61


 


Resp 19  20 17


 


B/P (MAP) 103/64 (77)  105/71 (82) 119/76 (90)


 


Pulse Ox 99  98 100


 


O2 Delivery Nasal Cannula Nasal Cannula Nasal Cannula Nasal Cannula


 


O2 Flow Rate 2.0 2.0 2.0 2.0


 


    





    





 7/9/17 7/9/17 7/9/17 7/9/17





 06:00 07:00 07:31 07:32


 


Pulse 61 61 60 62


 


Resp 16 16  


 


B/P (MAP) 108/66 (80) 117/78 (91) 111/75 111/75


 


Pulse Ox 98 98  


 


O2 Delivery Nasal Cannula Room Air  


 


O2 Flow Rate 2.0   





 7/9/17 7/9/17 7/9/17 7/9/17





 08:00 08:00 09:00 10:00


 


Temp  99.0  





  99.0  


 


Pulse  66 66 62


 


Resp  18 17 17


 


B/P (MAP)  116/74 (88) 103/70 (81) 102/60 (74)


 


Pulse Ox  99 96 97


 


O2 Delivery Room Air Room Air Room Air Room Air


 


    





    





 7/9/17   





 11:00   


 


Pulse 61   


 


Resp 16   


 


B/P (MAP) 104/67 (79)   


 


Pulse Ox 99   


 


O2 Delivery Room Air   














Intake and Output   


 


 7/8/17 7/8/17 7/9/17





 15:00 23:00 07:00


 


Intake Total 360 ml 350 ml 720 ml


 


Output Total  220 ml 


 


Balance 360 ml 130 ml 720 ml

















JASBIR TREJO MD Jul 9, 2017 14:44

## 2017-07-10 VITALS — DIASTOLIC BLOOD PRESSURE: 81 MMHG | SYSTOLIC BLOOD PRESSURE: 135 MMHG

## 2017-07-10 VITALS — DIASTOLIC BLOOD PRESSURE: 73 MMHG | SYSTOLIC BLOOD PRESSURE: 117 MMHG

## 2017-07-10 VITALS — SYSTOLIC BLOOD PRESSURE: 105 MMHG | DIASTOLIC BLOOD PRESSURE: 63 MMHG

## 2017-07-10 VITALS — SYSTOLIC BLOOD PRESSURE: 128 MMHG | DIASTOLIC BLOOD PRESSURE: 78 MMHG

## 2017-07-10 VITALS — DIASTOLIC BLOOD PRESSURE: 76 MMHG | SYSTOLIC BLOOD PRESSURE: 121 MMHG

## 2017-07-10 VITALS — SYSTOLIC BLOOD PRESSURE: 132 MMHG | DIASTOLIC BLOOD PRESSURE: 81 MMHG

## 2017-07-10 LAB
ANION GAP SERPL CALC-SCNC: 7 MMOL/L (ref 6–14)
BUN SERPL-MCNC: 45 MG/DL (ref 8–26)
CALCIUM SERPL-MCNC: 8.4 MG/DL (ref 8.5–10.1)
CHLORIDE SERPL-SCNC: 103 MMOL/L (ref 98–107)
CO2 SERPL-SCNC: 32 MMOL/L (ref 21–32)
CREAT SERPL-MCNC: 1.8 MG/DL (ref 0.7–1.3)
ERYTHROCYTE [DISTWIDTH] IN BLOOD BY AUTOMATED COUNT: 14.3 % (ref 11.5–14.5)
GFR SERPLBLD BASED ON 1.73 SQ M-ARVRAT: 37.7 ML/MIN
GLUCOSE SERPL-MCNC: 61 MG/DL (ref 70–99)
HCT VFR BLD CALC: 29.3 % (ref 39–53)
HGB BLD-MCNC: 10 G/DL (ref 13–17.5)
MCH RBC QN AUTO: 29 PG (ref 25–35)
MCHC RBC AUTO-ENTMCNC: 34 G/DL (ref 31–37)
MCV RBC AUTO: 84 FL (ref 79–100)
PLATELET # BLD AUTO: 141 X10^3/UL (ref 140–400)
POTASSIUM SERPL-SCNC: 4 MMOL/L (ref 3.5–5.1)
RBC # BLD AUTO: 3.48 X10^6/UL (ref 4.3–5.7)
SODIUM SERPL-SCNC: 142 MMOL/L (ref 136–145)
WBC # BLD AUTO: 6.8 X10^3/UL (ref 4–11)

## 2017-07-10 RX ADMIN — INSULIN ASPART SCH UNITS: 100 INJECTION, SOLUTION INTRAVENOUS; SUBCUTANEOUS at 17:00

## 2017-07-10 RX ADMIN — AMIODARONE HYDROCHLORIDE SCH MG: 200 TABLET ORAL at 20:46

## 2017-07-10 RX ADMIN — PANTOPRAZOLE SODIUM SCH MG: 40 TABLET, DELAYED RELEASE ORAL at 09:18

## 2017-07-10 RX ADMIN — ACETAMINOPHEN, ASPIRIN AND CAFFEINE SCH TAB: 250; 250; 65 TABLET, FILM COATED ORAL at 09:17

## 2017-07-10 RX ADMIN — SOTALOL HYDROCHLORIDE SCH MG: 80 TABLET ORAL at 09:20

## 2017-07-10 RX ADMIN — ACETAMINOPHEN, ASPIRIN AND CAFFEINE SCH TAB: 250; 250; 65 TABLET, FILM COATED ORAL at 20:44

## 2017-07-10 RX ADMIN — IBUPROFEN PRN MG: 400 TABLET ORAL at 10:55

## 2017-07-10 RX ADMIN — AMIODARONE HYDROCHLORIDE SCH MG: 200 TABLET ORAL at 09:17

## 2017-07-10 RX ADMIN — INSULIN ASPART SCH UNITS: 100 INJECTION, SOLUTION INTRAVENOUS; SUBCUTANEOUS at 08:00

## 2017-07-10 RX ADMIN — INSULIN ASPART SCH UNITS: 100 INJECTION, SOLUTION INTRAVENOUS; SUBCUTANEOUS at 12:00

## 2017-07-10 RX ADMIN — SOTALOL HYDROCHLORIDE SCH MG: 80 TABLET ORAL at 20:44

## 2017-07-10 RX ADMIN — ATORVASTATIN CALCIUM SCH MG: 40 TABLET, FILM COATED ORAL at 20:44

## 2017-07-10 RX ADMIN — ESCITALOPRAM OXALATE SCH MG: 10 TABLET ORAL at 17:36

## 2017-07-10 RX ADMIN — PANTOPRAZOLE SODIUM SCH MG: 40 TABLET, DELAYED RELEASE ORAL at 17:00

## 2017-07-10 RX ADMIN — SERTRALINE HYDROCHLORIDE SCH MG: 50 TABLET ORAL at 09:20

## 2017-07-10 NOTE — PDOC
Provider Note


Provider Note


will dc metfromin, lasix re higher bun/creat, less glipizide re hypo risk- rest 

same - ef 45 % per last echo- now on amio for vt, get baseline tsh











DAPHNEY NAIR MD Jul 10, 2017 09:03

## 2017-07-10 NOTE — PDOC
PROGRESS NOTES


Subjective


Subjective


Mr Mcallister is currently in sinus rhythm. He doesn't have any complaints of 

chest pain or shortness of breath. His biggest concern is the severe headaches 

he has been experiencing.





Objective


Objective





Vital Signs








  Date Time  Temp Pulse Resp B/P (MAP) Pulse Ox O2 Delivery O2 Flow Rate FiO2


 


7/10/17 09:20  66  135/81    


 


7/10/17 08:00 98.2  18  97 Room Air  





 98.2       


 


7/9/17 06:00       2.0 














Intake and Output 


 


 7/10/17





 07:00


 


Intake Total 2220 ml


 


Output Total 1375 ml


 


Balance 845 ml


 


 


 


Intake Oral 2220 ml


 


Output Urine Total 1375 ml


 


# Voids 2











Physical Exam


Heart:  Regular rate (and rhythm), Other (systolic murmur at 2nd ICS right 

sternum)


Extremities:  No edema, Normal pulses (2/4 radial b/l)


General:  Alert, No acute distress


Lungs:  Clear to auscultation





Assessment


Assessment


Mr Mcallister's heart rhythm is being controlled well with amiodarone. 


Recommend neurology consult to find out cause of headaches








Problems


Medical Problems:


(1) Elevated brain natriuretic peptide (BNP) level


Status: Acute  





(2) Hypomagnesemia


Status: Acute  





(3) Hypotension


Status: Acute  





(4) V-tach


Status: Acute  








Comment


Review of Relevant


I have reviewed the following items nirmal (where applicable) has been applied.


Labs





Laboratory Tests








Test


  7/8/17


14:09 7/8/17


16:20 7/8/17


16:50 7/8/17


22:45


 


Glucose (Fingerstick)


  173 mg/dL


(70-99) 102 mg/dL


(70-99) 


  


 


 


Troponin I Quantitative


  


  


  0.095 ng/mL


(0.000-0.055) 0.072 ng/mL


(0.000-0.055)


 


Test


  7/9/17


04:07 7/9/17


07:24 7/9/17


11:17 7/9/17


17:11


 


White Blood Count


  6.9 x10^3/uL


(4.0-11.0) 


  


  


 


 


Red Blood Count


  3.53 x10^6/uL


(4.30-5.70) 


  


  


 


 


Hemoglobin


  10.2 g/dL


(13.0-17.5) 


  


  


 


 


Hematocrit


  29.9 %


(39.0-53.0) 


  


  


 


 


Mean Corpuscular Volume 85 fL ()    


 


Mean Corpuscular Hemoglobin 29 pg (25-35)    


 


Mean Corpuscular Hemoglobin


Concent 34 g/dL


(31-37) 


  


  


 


 


Red Cell Distribution Width


  14.6 %


(11.5-14.5) 


  


  


 


 


Platelet Count


  145 x10^3/uL


(140-400) 


  


  


 


 


Neutrophils (%) (Auto) 69 % (31-73)    


 


Lymphocytes (%) (Auto) 20 % (24-48)    


 


Monocytes (%) (Auto) 8 % (0-9)    


 


Eosinophils (%) (Auto) 3 % (0-3)    


 


Basophils (%) (Auto) 0 % (0-3)    


 


Neutrophils # (Auto)


  4.8 x10^3uL


(1.8-7.7) 


  


  


 


 


Lymphocytes # (Auto)


  1.4 x10^3/uL


(1.0-4.8) 


  


  


 


 


Monocytes # (Auto)


  0.5 x10^3/uL


(0.0-1.1) 


  


  


 


 


Eosinophils # (Auto)


  0.2 x10^3/uL


(0.0-0.7) 


  


  


 


 


Basophils # (Auto)


  0.0 x10^3/uL


(0.0-0.2) 


  


  


 


 


Sodium Level


  137 mmol/L


(136-145) 


  


  


 


 


Potassium Level


  4.1 mmol/L


(3.5-5.1) 


  


  


 


 


Chloride Level


  100 mmol/L


() 


  


  


 


 


Carbon Dioxide Level


  28 mmol/L


(21-32) 


  


  


 


 


Anion Gap 9 (6-14)    


 


Blood Urea Nitrogen


  44 mg/dL


(8-26) 


  


  


 


 


Creatinine


  2.0 mg/dL


(0.7-1.3) 


  


  


 


 


Estimated GFR


(Cockcroft-Gault) 33.4 


  


  


  


 


 


Glucose Level


  99 mg/dL


(70-99) 


  


  


 


 


Calcium Level


  8.6 mg/dL


(8.5-10.1) 


  


  


 


 


Glucose (Fingerstick)


  


  93 mg/dL


(70-99) 108 mg/dL


(70-99) 92 mg/dL


(70-99)


 


Test


  7/9/17


21:00 7/10/17


03:45 7/10/17


07:55 7/10/17


11:03


 


Glucose (Fingerstick)


  215 mg/dL


(70-99) 


  81 mg/dL


(70-99) 147 mg/dL


(70-99)


 


White Blood Count


  


  6.8 x10^3/uL


(4.0-11.0) 


  


 


 


Red Blood Count


  


  3.48 x10^6/uL


(4.30-5.70) 


  


 


 


Hemoglobin


  


  10.0 g/dL


(13.0-17.5) 


  


 


 


Hematocrit


  


  29.3 %


(39.0-53.0) 


  


 


 


Mean Corpuscular Volume  84 fL ()   


 


Mean Corpuscular Hemoglobin  29 pg (25-35)   


 


Mean Corpuscular Hemoglobin


Concent 


  34 g/dL


(31-37) 


  


 


 


Red Cell Distribution Width


  


  14.3 %


(11.5-14.5) 


  


 


 


Platelet Count


  


  141 x10^3/uL


(140-400) 


  


 


 


Sodium Level


  


  142 mmol/L


(136-145) 


  


 


 


Potassium Level


  


  4.0 mmol/L


(3.5-5.1) 


  


 


 


Chloride Level


  


  103 mmol/L


() 


  


 


 


Carbon Dioxide Level


  


  32 mmol/L


(21-32) 


  


 


 


Anion Gap  7 (6-14)   


 


Blood Urea Nitrogen


  


  45 mg/dL


(8-26) 


  


 


 


Creatinine


  


  1.8 mg/dL


(0.7-1.3) 


  


 


 


Estimated GFR


(Cockcroft-Gault) 


  37.7 


  


  


 


 


Glucose Level


  


  61 mg/dL


(70-99) 


  


 


 


Calcium Level


  


  8.4 mg/dL


(8.5-10.1) 


  


 








Laboratory Tests








Test


  7/9/17


17:11 7/9/17


21:00 7/10/17


03:45 7/10/17


07:55


 


Glucose (Fingerstick)


  92 mg/dL


(70-99) 215 mg/dL


(70-99) 


  81 mg/dL


(70-99)


 


White Blood Count


  


  


  6.8 x10^3/uL


(4.0-11.0) 


 


 


Red Blood Count


  


  


  3.48 x10^6/uL


(4.30-5.70) 


 


 


Hemoglobin


  


  


  10.0 g/dL


(13.0-17.5) 


 


 


Hematocrit


  


  


  29.3 %


(39.0-53.0) 


 


 


Mean Corpuscular Volume   84 fL ()  


 


Mean Corpuscular Hemoglobin   29 pg (25-35)  


 


Mean Corpuscular Hemoglobin


Concent 


  


  34 g/dL


(31-37) 


 


 


Red Cell Distribution Width


  


  


  14.3 %


(11.5-14.5) 


 


 


Platelet Count


  


  


  141 x10^3/uL


(140-400) 


 


 


Sodium Level


  


  


  142 mmol/L


(136-145) 


 


 


Potassium Level


  


  


  4.0 mmol/L


(3.5-5.1) 


 


 


Chloride Level


  


  


  103 mmol/L


() 


 


 


Carbon Dioxide Level


  


  


  32 mmol/L


(21-32) 


 


 


Anion Gap   7 (6-14)  


 


Blood Urea Nitrogen


  


  


  45 mg/dL


(8-26) 


 


 


Creatinine


  


  


  1.8 mg/dL


(0.7-1.3) 


 


 


Estimated GFR


(Cockcroft-Gault) 


  


  37.7 


  


 


 


Glucose Level


  


  


  61 mg/dL


(70-99) 


 


 


Calcium Level


  


  


  8.4 mg/dL


(8.5-10.1) 


 


 


Test


  7/10/17


11:03 


  


  


 


 


Glucose (Fingerstick)


  147 mg/dL


(70-99) 


  


  


 








Medications





Current Medications


Amiodarone HCl 150 mg/Dextrose 103 ml @  618 mls/hr 1X  ONCE IV  Last 

administered on 7/8/17at 10:56;  Start 7/8/17 at 10:30;  Stop 7/8/17 at 10:39;  

Status DC


Amiodarone HCl 900 mg/Dextrose 518 ml @ 0 mls/hr CONT  PRN IV SEE I/O RECORD 

Last administered on 7/8/17at 11:27;  Start 7/8/17 at 10:30;  Stop 7/8/17 at 11:

28;  Status DC


Aspirin (Ecotrin) 325 mg 1X  ONCE PO  Last administered on 7/8/17at 10:56;  

Start 7/8/17 at 10:30;  Stop 7/8/17 at 10:31;  Status DC


Sodium Chloride 1,000 ml @  1,000 mls/hr 1X  ONCE IV  Last administered on 7/8/ 17at 11:00;  Start 7/8/17 at 11:00;  Stop 7/8/17 at 11:59;  Status DC


Ondansetron HCl (Zofran) 4 mg PRN Q8HRS  PRN IV NAUSEA/VOMITING;  Start 7/8/17 

at 11:00;  Stop 7/9/17 at 10:59;  Status DC


Fentanyl Citrate (Fentanyl 2ml Vial) 50 mcg PRN Q2HR  PRN IV PAIN;  Start 7/8/ 17 at 11:00;  Stop 7/9/17 at 10:59;  Status DC


Magnesium Oxide (Magnesium Oxide) 800 mg 1X  STAT PO ;  Start 7/8/17 at 11:10;  

Stop 7/8/17 at 11:11;  Status DC


Magnesium Sulfate/ Dextrose 50 ml @ 25 mls/hr 1X  ONCE IV  Last administered on 

7/8/17at 11:58;  Start 7/8/17 at 11:45;  Stop 7/8/17 at 13:44;  Status DC


Atorvastatin Calcium (Lipitor) 40 mg HS PO  Last administered on 7/9/17at 20:46

;  Start 7/8/17 at 21:00


Furosemide (Lasix) 80 mg DAILY PO  Last administered on 7/9/17at 07:30;  Start 7 /8/17 at 14:00;  Stop 7/10/17 at 09:01;  Status DC


Glipizide (Glucotrol) 2.5 mg BID PO  Last administered on 7/9/17at 20:47;  

Start 7/8/17 at 21:00;  Stop 7/10/17 at 09:00;  Status DC


Metformin HCl (Glucophage) 1,000 mg BIDWMEALS PO  Last administered on 7/9/17at 

17:53;  Start 7/8/17 at 17:00;  Stop 7/10/17 at 09:00;  Status DC


Pantoprazole Sodium (Protonix) 40 mg BIDAC PO  Last administered on 7/10/17at 09

:18;  Start 7/8/17 at 16:30


Sertraline HCl (Zoloft) 50 mg DAILY PO  Last administered on 7/10/17at 09:20;  

Start 7/8/17 at 14:00


Sotalol HCl (Betapace) 40 mg BID PO  Last administered on 7/10/17at 09:20;  

Start 7/8/17 at 14:00


Acetaminophen/ Aspirin/Caffeine (Excedrin Migraine) 1 tab BID PO  Last 

administered on 7/10/17at 09:17;  Start 7/8/17 at 21:00


Potassium Chloride (Klor-Con) 20 meq BIDWMEALS PO  Last administered on 7/9/ 17at 17:55;  Start 7/8/17 at 17:00;  Stop 7/10/17 at 09:01;  Status DC


Insulin Aspart (NovoLOG) 0-5 UNITS TIDWMEALS SQ  Last administered on 7/8/17at 

14:27;  Start 7/8/17 at 14:00


Dextrose (Dextrose 50%-Water Syringe) 12.5 gm PRN Q15MIN  PRN IV SEE COMMENTS;  

Start 7/8/17 at 13:45;  Status Cancel


Alprazolam (Xanax) 0.25 mg PRN Q8HRS  PRN PO ANXIETY / AGITATION;  Start 7/8/17 

at 13:45


Norepinephrine Bitartrate 250 ml @ 0 mls/hr CONT  PRN IV SEE I/O RECORD Last 

administered on 7/8/17at 15:18;  Start 7/8/17 at 15:15


Amiodarone HCl (Cordarone) 400 mg BID PO  Last administered on 7/10/17at 09:17;

  Start 7/8/17 at 18:00


Ibuprofen (Motrin) 400 mg PRN TID  PRN PO headache Last administered on 7/10/

17at 10:55;  Start 7/8/17 at 20:15


Dextrose 500 ml @  500 mls/hr 1X  ONCE IV ;  Start 7/10/17 at 06:30;  Stop 7/10/

17 at 07:29;  Status Cancel


Glipizide (Glucotrol) 2.5 mg DAILYWBKFT PO  Last administered on 7/10/17at 09:19

;  Start 7/11/17 at 08:00





Active Scripts


Active


Pantoprazole Sodium 40 Mg Tablet.dr 40 Mg PO BIDAC


Reported


Stephanie Back & Body Caplet (Aspirin/Caffeine) 1 Each Tablet 2 Each PO BID


Zoloft (Sertraline Hcl) 50 Mg Tablet 1 Tab PO DAILY


Glipizide 5 Mg Tablet 0.5 Tab PO BID


Potassium Chloride 20 Meq Tablet.er 20 Meq PO BID


     last dose this am


     next dose with supper


Sotalol (Sotalol Hcl) 80 Mg Tablet 40 Mg PO BID


     last dose this am


     next dose tonight


Furosemide 80 Mg Tablet 80 Mg PO DAILY


     last dose this am


     next dose tomorrow


Atorvastatin Calcium 40 Mg Tablet 40 Mg PO HS


     last dose was last dose


     next dose tonight


Metformin Hcl 1,000 Mg Tablet 1,000 Mg PO BID


     last dose this am


     next dose with supper


Vitals/I & O





Vital Sign - Last 24 Hours








 7/9/17 7/9/17 7/9/17 7/9/17





 15:03 19:28 20:00 20:47


 


Temp 98.3 98.1  





 98.3 98.1  


 


Pulse 62 70  70


 


Resp 22 16  


 


B/P (MAP) 108/74 (85) 111/69 (83)  111/69


 


Pulse Ox 94 92  


 


O2 Delivery Room Air Room Air Room Air 


 


    





    





 7/9/17 7/9/17 7/10/17 7/10/17





 20:47 23:02 03:24 08:00


 


Temp  98.1 97.8 98.2





  98.1 97.8 98.2


 


Pulse 70 69 64 66


 


Resp  16 18 18


 


B/P (MAP) 111/69 123/77 (92) 128/78 (95) 135/81 (99)


 


Pulse Ox  94 95 97


 


O2 Delivery  Room Air Room Air Room Air


 


    





    





 7/10/17 7/10/17  





 09:17 09:20  


 


Pulse 66 66  


 


B/P (MAP) 135/81 135/81  














Intake and Output   


 


 7/9/17 7/9/17 7/10/17





 15:00 23:00 07:00


 


Intake Total 420 ml 1000 ml 800 ml


 


Output Total   1375 ml


 


Balance 420 ml 1000 ml -575 ml

















BRITTNEE AMAYA MD Jul 10, 2017 13:15

## 2017-07-10 NOTE — PDOC2
NEUROLOGY CONSULT


Date of Admission


Date of Admission


DATE: 7/10/17 


TIME: 14:35





Reason for Consult


Reason for Consult:


Headaches





Referring Physician


Referring Physician:


Dr. Leiva


PCP: Dr. Ragland





Source


Source:  Chart review, Patient





History of Present Illness


History of Present Illness


The patient is a 67-year-old right-handed male admitted for ventricular 

tachycardia whom I am asked to see regarding headaches. He describes throbbing 

usually right-sided pain with photo phonophobia and nausea up to several times 

a week going on for up to 20 years. He has no history of stroke, seizure, or 

head injury. Dr. Lainez saw him for headaches last month and obtained MRI and CT 

studies, reviewed below, but did not start any medications as the headaches had 

resolved. The patient is interested in a daily prevention medication. He has 

not figured out any inciting or mitigating features except ibuprofen does help 

some. He says that he sees a doctor regarding his memory but does not remember 

her name.





Past Medical History


Cardiovascular:  AFIB, CAD, HTN, Hyperlipidemia, Valve insufficiency, Other (

peripheral vascular disease)


Pulmonary:  Bronchitis, Other (respiratory failure and sepsis, 3/14)


CENTRAL NERVOUS SYSTEM:  Dementia (?), Periperal neuropathy


GI:  Peptic Ulcer disease


Heme/Onc:  Anemia NOS


Psych:  Anxiety, Depression


Musculoskeletal:  Osteoarthritis


Rheumatologic:  Rheumatoid arthritis


ENT:  Other (retinal detachment)


Renal/:  Benign prostatic enlarg., Other (urinary retention)


Endocrine:  Diabetes





Past Surgical History


Past Surgical History:  Appendectomy, Cataract Removal, Total knee replacement (

left), Tonsillectomy, Other (cardiac catheterization)





Family History


Family History:  Hypertension





Social History


Social History


, quit smoking, no alcohol





Current Medications


Current Medications





Current Medications


Amiodarone HCl 150 mg/Dextrose 103 ml @  618 mls/hr 1X  ONCE IV  Last 

administered on 7/8/17at 10:56;  Start 7/8/17 at 10:30;  Stop 7/8/17 at 10:39;  

Status DC


Amiodarone HCl 900 mg/Dextrose 518 ml @ 0 mls/hr CONT  PRN IV SEE I/O RECORD 

Last administered on 7/8/17at 11:27;  Start 7/8/17 at 10:30;  Stop 7/8/17 at 11:

28;  Status DC


Aspirin (Ecotrin) 325 mg 1X  ONCE PO  Last administered on 7/8/17at 10:56;  

Start 7/8/17 at 10:30;  Stop 7/8/17 at 10:31;  Status DC


Sodium Chloride 1,000 ml @  1,000 mls/hr 1X  ONCE IV  Last administered on 7/8/ 17at 11:00;  Start 7/8/17 at 11:00;  Stop 7/8/17 at 11:59;  Status DC


Ondansetron HCl (Zofran) 4 mg PRN Q8HRS  PRN IV NAUSEA/VOMITING;  Start 7/8/17 

at 11:00;  Stop 7/9/17 at 10:59;  Status DC


Fentanyl Citrate (Fentanyl 2ml Vial) 50 mcg PRN Q2HR  PRN IV PAIN;  Start 7/8/ 17 at 11:00;  Stop 7/9/17 at 10:59;  Status DC


Magnesium Oxide (Magnesium Oxide) 800 mg 1X  STAT PO ;  Start 7/8/17 at 11:10;  

Stop 7/8/17 at 11:11;  Status DC


Magnesium Sulfate/ Dextrose 50 ml @ 25 mls/hr 1X  ONCE IV  Last administered on 

7/8/17at 11:58;  Start 7/8/17 at 11:45;  Stop 7/8/17 at 13:44;  Status DC


Atorvastatin Calcium (Lipitor) 40 mg HS PO  Last administered on 7/9/17at 20:46

;  Start 7/8/17 at 21:00


Furosemide (Lasix) 80 mg DAILY PO  Last administered on 7/9/17at 07:30;  Start 7 /8/17 at 14:00;  Stop 7/10/17 at 09:01;  Status DC


Glipizide (Glucotrol) 2.5 mg BID PO  Last administered on 7/9/17at 20:47;  

Start 7/8/17 at 21:00;  Stop 7/10/17 at 09:00;  Status DC


Metformin HCl (Glucophage) 1,000 mg BIDWMEALS PO  Last administered on 7/9/17at 

17:53;  Start 7/8/17 at 17:00;  Stop 7/10/17 at 09:00;  Status DC


Pantoprazole Sodium (Protonix) 40 mg BIDAC PO  Last administered on 7/10/17at 09

:18;  Start 7/8/17 at 16:30


Sertraline HCl (Zoloft) 50 mg DAILY PO  Last administered on 7/10/17at 09:20;  

Start 7/8/17 at 14:00


Sotalol HCl (Betapace) 40 mg BID PO  Last administered on 7/10/17at 09:20;  

Start 7/8/17 at 14:00


Acetaminophen/ Aspirin/Caffeine (Excedrin Migraine) 1 tab BID PO  Last 

administered on 7/10/17at 09:17;  Start 7/8/17 at 21:00


Potassium Chloride (Klor-Con) 20 meq BIDWMEALS PO  Last administered on 7/9/ 17at 17:55;  Start 7/8/17 at 17:00;  Stop 7/10/17 at 09:01;  Status DC


Insulin Aspart (NovoLOG) 0-5 UNITS TIDWMEALS SQ  Last administered on 7/8/17at 

14:27;  Start 7/8/17 at 14:00


Dextrose (Dextrose 50%-Water Syringe) 12.5 gm PRN Q15MIN  PRN IV SEE COMMENTS;  

Start 7/8/17 at 13:45;  Status Cancel


Alprazolam (Xanax) 0.25 mg PRN Q8HRS  PRN PO ANXIETY / AGITATION;  Start 7/8/17 

at 13:45


Norepinephrine Bitartrate 250 ml @ 0 mls/hr CONT  PRN IV SEE I/O RECORD Last 

administered on 7/8/17at 15:18;  Start 7/8/17 at 15:15


Amiodarone HCl (Cordarone) 400 mg BID PO  Last administered on 7/10/17at 09:17;

  Start 7/8/17 at 18:00


Ibuprofen (Motrin) 400 mg PRN TID  PRN PO headache Last administered on 7/10/

17at 10:55;  Start 7/8/17 at 20:15


Dextrose 500 ml @  500 mls/hr 1X  ONCE IV ;  Start 7/10/17 at 06:30;  Stop 7/10/

17 at 07:29;  Status Cancel


Glipizide (Glucotrol) 2.5 mg DAILYWBKFT PO  Last administered on 7/10/17at 09:19

;  Start 7/11/17 at 08:00





Active Scripts


Active


Pantoprazole Sodium 40 Mg Tablet. 40 Mg PO BIDAC


Reported


Stephanie Back & Body Caplet (Aspirin/Caffeine) 1 Each Tablet 2 Each PO BID


Zoloft (Sertraline Hcl) 50 Mg Tablet 1 Tab PO DAILY


Glipizide 5 Mg Tablet 0.5 Tab PO BID


Potassium Chloride 20 Meq Tablet.er 20 Meq PO BID


     last dose this am


     next dose with supper


Sotalol (Sotalol Hcl) 80 Mg Tablet 40 Mg PO BID


     last dose this am


     next dose tonight


Furosemide 80 Mg Tablet 80 Mg PO DAILY


     last dose this am


     next dose tomorrow


Atorvastatin Calcium 40 Mg Tablet 40 Mg PO HS


     last dose was last dose


     next dose tonight


Metformin Hcl 1,000 Mg Tablet 1,000 Mg PO BID


     last dose this am


     next dose with supper





Allergies


Allergies:  


Coded Allergies:  


     Penicillins (Verified  Allergy, Severe, RASH AND HIVES, 6/8/17)





ROS


Review of System


Patient denies fevers, chills, weight loss, dyspnea, angina, abdominal pain, 

change in bowels, or dysuria. 14 point review of systems is negative.





Physical Exam


Physical Examination


PHYSICAL EXAMINATION:  


Vital signs: see above.  


General appearance is normal and in no acute distress.  


HEENT:  Normocephalic and nontraumatic.  Eyes, nose, ears, and throat are 

unremarkable.  


Neck is supple. No lymphadenopathy. No bruits are heard over the carotid 

artery.  No crepitus. 


NEUROLOGICAL EXAMINATION:  


Mental Status Examination:  Alert. Oriented to time, place, and person. Answers 

questions and follows commends. Somewhat of a poor historian. Pupils are equal 

round and reactive to light and accommodation.  Funduscopic exam:  No 

papilledema.  Extraocular movements are intact.   Visual field exam shows no 

defect on the direct confrontation.  No motor or sensory deficits on the facial 

exam.  Uvula in the midline and the soft palate elevated symmetrically.  No 

deviation of the tongue to any direction.  Gross hearing is normal.  Shoulder 

shrug normal.  Muscle tone is normal.  Muscle strength is 5.  Deep tendon 

reflexes are 1+ all around.  Plantar reflex is with flexion response 

bilaterally.  Finger-to-nose test performance is accurate.  Alternative 

movements are accurate.  Romberg test is negative. Gait is arthritic, he 

usually uses a walker.  Sensory exam shows stocking loss. No cerebellar signs 

are elicited.





Vitals


VITALS





Vital Signs








  Date Time  Temp Pulse Resp B/P (MAP) Pulse Ox O2 Delivery O2 Flow Rate FiO2


 


7/10/17 09:20  66  135/81    


 


7/10/17 08:00 98.2  18  97 Room Air  





 98.2       











Labs


Labs





Laboratory Tests








Test


  7/8/17


16:20 7/8/17


16:50 7/8/17


22:45 7/9/17


04:07


 


Glucose (Fingerstick)


  102 mg/dL


(70-99) 


  


  


 


 


Troponin I Quantitative


  


  0.095 ng/mL


(0.000-0.055) 0.072 ng/mL


(0.000-0.055) 


 


 


White Blood Count


  


  


  


  6.9 x10^3/uL


(4.0-11.0)


 


Red Blood Count


  


  


  


  3.53 x10^6/uL


(4.30-5.70)


 


Hemoglobin


  


  


  


  10.2 g/dL


(13.0-17.5)


 


Hematocrit


  


  


  


  29.9 %


(39.0-53.0)


 


Mean Corpuscular Volume    85 fL () 


 


Mean Corpuscular Hemoglobin    29 pg (25-35) 


 


Mean Corpuscular Hemoglobin


Concent 


  


  


  34 g/dL


(31-37)


 


Red Cell Distribution Width


  


  


  


  14.6 %


(11.5-14.5)


 


Platelet Count


  


  


  


  145 x10^3/uL


(140-400)


 


Neutrophils (%) (Auto)    69 % (31-73) 


 


Lymphocytes (%) (Auto)    20 % (24-48) 


 


Monocytes (%) (Auto)    8 % (0-9) 


 


Eosinophils (%) (Auto)    3 % (0-3) 


 


Basophils (%) (Auto)    0 % (0-3) 


 


Neutrophils # (Auto)


  


  


  


  4.8 x10^3uL


(1.8-7.7)


 


Lymphocytes # (Auto)


  


  


  


  1.4 x10^3/uL


(1.0-4.8)


 


Monocytes # (Auto)


  


  


  


  0.5 x10^3/uL


(0.0-1.1)


 


Eosinophils # (Auto)


  


  


  


  0.2 x10^3/uL


(0.0-0.7)


 


Basophils # (Auto)


  


  


  


  0.0 x10^3/uL


(0.0-0.2)


 


Sodium Level


  


  


  


  137 mmol/L


(136-145)


 


Potassium Level


  


  


  


  4.1 mmol/L


(3.5-5.1)


 


Chloride Level


  


  


  


  100 mmol/L


()


 


Carbon Dioxide Level


  


  


  


  28 mmol/L


(21-32)


 


Anion Gap    9 (6-14) 


 


Blood Urea Nitrogen


  


  


  


  44 mg/dL


(8-26)


 


Creatinine


  


  


  


  2.0 mg/dL


(0.7-1.3)


 


Estimated GFR


(Cockcroft-Gault) 


  


  


  33.4 


 


 


Glucose Level


  


  


  


  99 mg/dL


(70-99)


 


Calcium Level


  


  


  


  8.6 mg/dL


(8.5-10.1)


 


Test


  7/9/17


07:24 7/9/17


11:17 7/9/17


17:11 7/9/17


21:00


 


Glucose (Fingerstick)


  93 mg/dL


(70-99) 108 mg/dL


(70-99) 92 mg/dL


(70-99) 215 mg/dL


(70-99)


 


Test


  7/10/17


03:45 7/10/17


07:55 7/10/17


11:03 


 


 


White Blood Count


  6.8 x10^3/uL


(4.0-11.0) 


  


  


 


 


Red Blood Count


  3.48 x10^6/uL


(4.30-5.70) 


  


  


 


 


Hemoglobin


  10.0 g/dL


(13.0-17.5) 


  


  


 


 


Hematocrit


  29.3 %


(39.0-53.0) 


  


  


 


 


Mean Corpuscular Volume 84 fL ()    


 


Mean Corpuscular Hemoglobin 29 pg (25-35)    


 


Mean Corpuscular Hemoglobin


Concent 34 g/dL


(31-37) 


  


  


 


 


Red Cell Distribution Width


  14.3 %


(11.5-14.5) 


  


  


 


 


Platelet Count


  141 x10^3/uL


(140-400) 


  


  


 


 


Sodium Level


  142 mmol/L


(136-145) 


  


  


 


 


Potassium Level


  4.0 mmol/L


(3.5-5.1) 


  


  


 


 


Chloride Level


  103 mmol/L


() 


  


  


 


 


Carbon Dioxide Level


  32 mmol/L


(21-32) 


  


  


 


 


Anion Gap 7 (6-14)    


 


Blood Urea Nitrogen


  45 mg/dL


(8-26) 


  


  


 


 


Creatinine


  1.8 mg/dL


(0.7-1.3) 


  


  


 


 


Estimated GFR


(Cockcroft-Gault) 37.7 


  


  


  


 


 


Glucose Level


  61 mg/dL


(70-99) 


  


  


 


 


Calcium Level


  8.4 mg/dL


(8.5-10.1) 


  


  


 


 


Glucose (Fingerstick)


  


  81 mg/dL


(70-99) 147 mg/dL


(70-99) 


 








Laboratory Tests








Test


  7/9/17


17:11 7/9/17


21:00 7/10/17


03:45 7/10/17


07:55


 


Glucose (Fingerstick)


  92 mg/dL


(70-99) 215 mg/dL


(70-99) 


  81 mg/dL


(70-99)


 


White Blood Count


  


  


  6.8 x10^3/uL


(4.0-11.0) 


 


 


Red Blood Count


  


  


  3.48 x10^6/uL


(4.30-5.70) 


 


 


Hemoglobin


  


  


  10.0 g/dL


(13.0-17.5) 


 


 


Hematocrit


  


  


  29.3 %


(39.0-53.0) 


 


 


Mean Corpuscular Volume   84 fL ()  


 


Mean Corpuscular Hemoglobin   29 pg (25-35)  


 


Mean Corpuscular Hemoglobin


Concent 


  


  34 g/dL


(31-37) 


 


 


Red Cell Distribution Width


  


  


  14.3 %


(11.5-14.5) 


 


 


Platelet Count


  


  


  141 x10^3/uL


(140-400) 


 


 


Sodium Level


  


  


  142 mmol/L


(136-145) 


 


 


Potassium Level


  


  


  4.0 mmol/L


(3.5-5.1) 


 


 


Chloride Level


  


  


  103 mmol/L


() 


 


 


Carbon Dioxide Level


  


  


  32 mmol/L


(21-32) 


 


 


Anion Gap   7 (6-14)  


 


Blood Urea Nitrogen


  


  


  45 mg/dL


(8-26) 


 


 


Creatinine


  


  


  1.8 mg/dL


(0.7-1.3) 


 


 


Estimated GFR


(Cockcroft-Gault) 


  


  37.7 


  


 


 


Glucose Level


  


  


  61 mg/dL


(70-99) 


 


 


Calcium Level


  


  


  8.4 mg/dL


(8.5-10.1) 


 


 


Test


  7/10/17


11:03 


  


  


 


 


Glucose (Fingerstick)


  147 mg/dL


(70-99) 


  


  


 





ESR 29 on 6/6/17





Images


Images


Head CT 6/11/17:


Noncontrast images of the head were obtained and are compared to an


examination one week earlier.





No acute or significant calvarial finding is seen. The visualized paranasal


sinuses appear unremarkable. There is no subdural or epidural hematoma. There


is underlying atrophy. An acute finding is not seen. There is no evidence of


hemorrhage. There has not been a significant change when compared to the


previous exam





IMPRESSION: No acute finding. No significant change





MRI brain, 6/12/17:


Findings: There is motion degradation. There is no evidence of recent 


infarct or cytotoxic edema. Ventricular size is proportionate to the 


sulcal spaces. There is mild to moderate generalized supratentorial 


atrophy.There is no significant midline shift, intraaxial mass effect, or 


focal abnormal extra-axial fluid collection. There is no significant 


signal abnormality including hemosiderin deposition of the brain 


parenchyma.   There is no nodular parenchymal or leptomeningeal 


enhancement. There is preservation of the major intracranial flow-voids at


the skull base. The cerebellar tonsils are normal in location. There is no


significant abnormality of the pineal gland. Pituitary gland is small. 


Paranasal sinuses are overall aerated. The mastoid air cells are aerated. 


There is preserved marrow signal of the clivus. There has been lens 


surgery bilaterally.


 


 


Impression:


1. There is no evidence of recent infarct or intracranial mass effect. 


There is generalized supratentorial atrophy..





Assessment/Plan


Assessment/Plan


Impression:


Migraine headaches without evidence of other pathology on bedside exam or 

imaging. Note that he had a normal sedimentation rate a month ago.


Peripheral neuropathy


He says that he does see a specialist regarding memory problems but at worst he 

has mild cognitive impairment per my exam, no evidence of dementia.





Recommendations:


Ibuprofen, Excedrin, or Fioricet as needed.


I advised the patient to try to limit pain medication use to no more than 2 

days a week


He has never been on a migraine prevention agent. I will start escitalopram 10 

mg daily and discussed side effects.


Hold off on additional studies.


Follow-up with me in 6 weeks.





Thank you for letting me help with the patient's care.











ADILENE LINDER MD Jul 10, 2017 14:42

## 2017-07-11 VITALS — SYSTOLIC BLOOD PRESSURE: 116 MMHG | DIASTOLIC BLOOD PRESSURE: 63 MMHG

## 2017-07-11 VITALS — DIASTOLIC BLOOD PRESSURE: 70 MMHG | SYSTOLIC BLOOD PRESSURE: 111 MMHG

## 2017-07-11 VITALS — DIASTOLIC BLOOD PRESSURE: 95 MMHG | SYSTOLIC BLOOD PRESSURE: 135 MMHG

## 2017-07-11 LAB
ANION GAP SERPL CALC-SCNC: 9 MMOL/L (ref 6–14)
BUN SERPL-MCNC: 39 MG/DL (ref 8–26)
CALCIUM SERPL-MCNC: 8.8 MG/DL (ref 8.5–10.1)
CHLORIDE SERPL-SCNC: 101 MMOL/L (ref 98–107)
CO2 SERPL-SCNC: 31 MMOL/L (ref 21–32)
CREAT SERPL-MCNC: 1.5 MG/DL (ref 0.7–1.3)
GFR SERPLBLD BASED ON 1.73 SQ M-ARVRAT: 46.5 ML/MIN
GLUCOSE SERPL-MCNC: 205 MG/DL (ref 70–99)
POTASSIUM SERPL-SCNC: 4.2 MMOL/L (ref 3.5–5.1)
SODIUM SERPL-SCNC: 141 MMOL/L (ref 136–145)

## 2017-07-11 RX ADMIN — PANTOPRAZOLE SODIUM SCH MG: 40 TABLET, DELAYED RELEASE ORAL at 08:25

## 2017-07-11 RX ADMIN — INSULIN ASPART SCH UNITS: 100 INJECTION, SOLUTION INTRAVENOUS; SUBCUTANEOUS at 08:00

## 2017-07-11 RX ADMIN — SERTRALINE HYDROCHLORIDE SCH MG: 50 TABLET ORAL at 08:26

## 2017-07-11 RX ADMIN — ACETAMINOPHEN, ASPIRIN AND CAFFEINE SCH TAB: 250; 250; 65 TABLET, FILM COATED ORAL at 08:26

## 2017-07-11 RX ADMIN — IBUPROFEN PRN MG: 400 TABLET ORAL at 08:24

## 2017-07-11 RX ADMIN — ESCITALOPRAM OXALATE SCH MG: 10 TABLET ORAL at 08:24

## 2017-07-11 RX ADMIN — AMIODARONE HYDROCHLORIDE SCH MG: 200 TABLET ORAL at 08:25

## 2017-07-11 RX ADMIN — INSULIN ASPART SCH UNITS: 100 INJECTION, SOLUTION INTRAVENOUS; SUBCUTANEOUS at 11:57

## 2017-07-11 RX ADMIN — SOTALOL HYDROCHLORIDE SCH MG: 80 TABLET ORAL at 08:24

## 2017-07-11 NOTE — [facility]
Admission Forms Criteria


                  GENERAL ADMISSION CRITERIA


 


                                                                               

   (Place 'X' for any and all applicable criteria):





Admission is indicated for ANY ONE of the following:





[ ]I.     Hemodynamic instability as indicated by ANY ONE of the following(1)(2)

(3)(4)(5):


          [ ]a) Vital sign abnormality not readily corrected by appropriate 

treatment within 12 to 24 hours indicated by ANY ONE of the following: 


                 [ ]i)    Hypotension


                 [ ]ii)   Symptomatic Tachycardia unresponsive to treatment (eg

, analgesia, fluids, sedation as indicated)


                 [ ]iii)  Orthostatic vital sign changes unresponsive to 

treatment (eg, fluids)


          [ ]b) Vital sign abnormality that is severe indicated by ANY ONE of 

the following:


                 [ ]i)    Inadequate perfusion indicated by ANY ONE of the 

following:


                          [ ]1)   Lactic acidosis (greater than 2 mmol/L)


                          [ ]2)   New abnormal capillary refill (greater than 3 

seconds)


                          [ ]3)   Other metabolic acidosis (arterial pH less 

than 7.35) not otherwise explained


                          [ ]4)   Reduced urine output


                          [ ]5)   Altered mental status


                          [ ]6)   Myocardial Ischemia


                 [ ]v)    Mean arterial pressure[A] less than 60 mm Hg


                 [ ]vi)   Mean arterial pressure[A] less than 70 mm Hg after 30 

minutes of appropriate treatment (eg, fluid resuscitation)


                 [ ]vii)  IV inotropic or vasopressor medication required to 

maintain adequate blood pressure or perfusion


                 [ ]viii) Sustained heart rate greater than 120 beats per 

minute in adult or child 6 years or older[B]]


[ ]II.    Hypertension requiring inpatient treatment as indicated by ANY ONE of 

the following(6)(7)(8):


                 [ ]a)  SBP greater than 220 mm Hg or DBP greater than 120 mm 

Hg despite treatment


                 [ ]b)  SBP greater than 140 mm Hg or DBP greater than 100 mm 

Hg with evidence of acute end organ 


                         damage as indicated by ANY ONE of the following:


                         [ ]i)    Encephalopathy


                         [ ]ii)   Acute renal failure as indicated by new onset 

of ANY ONE of the following(9)(10)(11)(12)(13):


                                  [ ]1)  A 3-fold rise in serum creatinine from 

baseline


                                  [ ]2) Serum creatinine greater than 4 mg/dL (

354 micromoles/L) with acute rise greater than 0.5 mg/dL (44.2 micromoles/L)


                                  [ ]3)  Reduction of more than 75% in 

estimated glomerular filtration rate from baseline


                                  [ ]4) Estimated glomerular filtration rate 

less than 35 mL/min/1.73m2 (0.59 mL/sec/1.73m2) in child up to 18 years of age


                                  [ ]5) Cessation of urine output indicated by 

ALL of the following: 


                                        [ ]A.   Adequate volume status


                                        [ ]B.   Inadequate urine output as 

indicated by ANY ONE of the following:


                                                [ ]a.     Urine output less 

than 0.3 mL/kg/hr for 24 hours


                                                [ ]b.     Anuria (urine output 

less than 0.1 mL/kg/hr) for 12 hours 


                        [ ]iii)  Aortic dissection


                        [ ]iv)  Myocardial ischemia


                        [ ]v)   Left ventricular heart failure 


                        [ ]vi)  Retinal hemorrhage


                        [ ]vii) Other significant finding 


                 [ ]c)  Hypertension in child requiring inpatient treatment as 

indicated by ALL of the following(14)(15)(16):


                        [ ]i)    Outpatient treatment not effective, not 

available, or not appropriate 


                        [ ]ii)   SBP or DBP greater than 95th percentile for age


                        [ ]iii)  Evidence of acute end organ damage as 

indicated by ANY ONE of the following:


                                 [ ]1)   Altered mental status


                                 [ ]2)   Acute renal failure as indicated by 

new onset of ANY ONE of the following(9)(10)(11)(12)(13):


                                        [ ]A.    A 3-fold rise in serum 

creatinine from baseline


                                        [ ]B.    Serum creatinine greater than 

4 mg/dL (354 micromoles/L) with acute rise greater than 0.5 mg/dL (44.2 

micromoles/L)


                                        [ ]C.    Reduction of more than 75% in 

estimated glomerular filtration rate from baseline


                                        [ ]D.    Estimated glomerular 

filtration rate less than 35 mL/min/1.73m2 (0.59 mL/sec/1.73m2)in child up to 

18 years of age


                                        [ ]E.    Cessation of urine output 

indicated by ALL of the following:


                                                  [ ]a.   Adequate volume status


                                                  [ ]b.   Inadequate urine 

output as indicated by ANY ONE of the following:


                                                           [ ]1)  Urine output 

less than 0.3 mL/kg/hr for 24 hours 


                                                           [ ]2)  Anuria (urine 

output less than 0.1 mL/kg/hr) for 12 hours


                                                           [ ]3)  Severe 

headache


                                                           [ ]4)  Visual 

disturbance


                                                           [ ]5)  Retinal 

hemorrhage


                                                           [ ]6)  Other 

significant finding


[ ]III.   Acute cardiac or peripheral ischemia as indicated by ANY ONE of the 

following:


          [ ]a)  Acute coronary syndrome(17)(18)


          [ ]b)  Acute peripheral ischemia (eg, pulseless, cool, mottled, or 

cyanotic extremity)(19) 


[ ]IV.     Cardiac arrhythmias or findings of immediate concern indicated by 

ANY ONE of the following(20)(21):


           [ ]a)  Heart rhythms that are inherently dangerous or unstable 

indicated by ANY ONE of the following(22)(23)(24):


                  [ ]i)    Resuscitated ventricular fibrillation or cardiac 

arrest 


                  [ ]ii)   Ventricular escape rhythm


                  [ ]iii)  Sustained ventricular tachycardia (30 seconds or 

more of ventricular rhythm at greater than 100 beats per minute)


                  [ ]iv)  Nonsustained ventricular tachycardia and ANY ONE of 

the following:


                          [ ]1)   Suspected cardiac ischemia as cause or 

consequence of ventricular tachycardia


                          [ ]2)   In setting of acute myocarditis


           [ ]b)  Unstable cardiac conduction defects indicated by ANY ONE of 

the following(24)(25)(26):


                  [ ]i)    Type II second-degree atrioventricular block 


                  [ ]ii)   Third-degree atrioventricular block


                  [ ]iii)  New-onset left bundle branch block with suspected 

myocardial ischemia


           [ ]c)  Any heart rhythm and ANY ONE of the following(22)(23)(27)(28)(

29):


                  [ ] i)    Continuous long-term ECG monitoring needed (eg, 

initiation of drug requiring monitoring for more than 24 hours)


                  [ ] ii)   Patient has automatic implanted cardioverter 

defibrillator that is repeatedly firing, malfunctioning, 


                            or in need of immediate adjustment of settings 

beyond the scope of ambulatory or observation care. 


           [ ]d)  Heart rhythms of concern due to ANY ONE of the following:


                  [ ]i)    Hypotension


                  [ ]ii)   Respiratory distress


                  [ ]iii)  Association with other significant symptoms (eg, 

bradycardia with syncope or ongoing dizziness, 


                          supraventricular tachycardia with chest pain) (27)(28)

(30)


[ ] V.          Severe heart failure as indicated by ANY ONE of the following (

31)(32):


            [ ]a)  Respiratory distress 


            [ ]b)  Hypotension


            [ ]c)  Anasarca (refractory to outpatient therapy) 


            [ ]d)  Cardiac arrhythmias of immediate concern 


            [ ]e)  Myocardial ischemia


[ ]VI.     Respiratory abnormalities, including ANY ONE of the following(33)(34)

(35)(36):


           [ ]a)  Respiratory rate greater than 30 breaths per minute 

unresponsive to treatment [A]


           [ ]b)  New saturation of arterial oxygen less than 90%


           [ ]c)  New partial pressure of carbon dioxide greater than 44 mm Hg (

5.9 kPa)


           [ ]d)  Supplemental oxygen or respiratory treatments needed that are 

new or not performable at other levels of care


           [ ]e)  New-onset cyanosis


           [ ]f)   Inability to protect airway


           [ ]g)  Chronic lung disease with severe deterioration (not 

responsive to emergency and observation care treatment as 


                   appropriate) as indicated by ANY ONE of the  following(34)(36

):


                     [ ]i)    SaO2 5% below baseline in patient with chronic 

hypoxemia


                     [ ]ii)   New requirement for supplemental oxygen to keep 

SaO2 at baseline or acceptable level


                     [ ]iii)  Required supplemental oxygen performable only in 

acute inpatient setting


                     [ ]iv)   Severe airflow or ventilation abnormalities


                     [ ]v)    Previously mobile patient unable to walk between 

rooms 


                     [ ]vi    Inability to eat or sleep due to dyspnea


                     [ ]vii)  Rapid rate of exacerbation onset 


                     [ ]viii) Altered mental status


 ]VII.  Severe airflow or ventilation abnormalities (not responsive to 

emergency and observation care treatment as appropriate) as 


         indicated by ANY  ONE of the following(33)(34)(35)(37):


          [ ]a)  PCO2 greater than 42 mm Hg (5.6 kPa) and pH less than 7.35 (new

)


          [ ]b)  Documented PCO2 increased more than 5 mm Hg (0.7 kPa) from 

disease baseline


          [ ]c)  Airflow measurements [B] less than 60% of previous best or 

predicted (eg, peak expiratory flow rate less than 300 L/minute)


                  despite intensive emergent treatment [C]


          [ ]d)  Required respiratory treatments that are performable only in 

acute inpatient setting


[ ]VIII.  Impending or actual respiratory arrest  ( Also use Respiratory 

Failure GRG  for severe respiratory disease and


          long-term mechanical ventilation patients)


[ ]IX.    Neurologic abnormalities, including ANY ONE of the following:


          [ ]a)  New findings that suggest ANY ONE of the following:


                 [ ]i)    CNS infection(38)


                 [ ]ii)   Cerebral bleeding, ischemia, or vasospasm(39)(40)


                 [ ]iii)  Increased intracranial pressure, hydrocephalus, or 

cerebral edema(41)(42)(43)


                 [ ]iv)  Spinal cord injury(44)


         [ ]b)  Uncontrolled seizures(45)


         [ ]c)  New-onset coma (eg, [place] coma scale score less than 9) or 

unexplained abnormal mental status 


                (eg, [place] coma scale score less than 14) [D](41)(46)(47)


[ ]X.   New-onset severe neurologic findings requiring inpatient care; examples 

include(42)(48)(49):


         [ ]a)  Papilledema


         [ ]b)  Cerebral edema


         [ ]c)  Mass effect on CT scan


[ ]XI.    Suspected acute intra-abdominal process with peritoneal signs, 

abdominal mass, or similar findings (50)(51)(52)


[ ]XII.   Severe physiologic disorder remaining after emergency or observation 

level care (as appropriate) as indicated by 


         ANY ONE of the following (53):


         [ ]a)  Significant dehydration


         [ ]b)  Diabetic ketoacidosis


         [ ]c)  Hyperglycemic hyperosmolar state (eg, osmolality greater than 

320 mOsm/kg (mmol/kg)


         [ ]d)  Hypoglycemia


         [ ]e)  Other (new) acid-base disorder with pH less than 7.35 or 

greater than 7.5(54)


         [ ]f)  Thyroid storm (55)


         [ ]g)  Myxedema coma (55)


[ ]XIII.  Abdominal abnormalities with ANY ONE of the following(56)(57):


         [ ]a)  Absent bowel sounds with complete ileus


         [ ]b)  Signs of intestinal obstruction or peritonitis [E]


         [ ]c)  Nausea and vomiting that cannot be controlled with outpatient 

or observation care


[ ]XIV. Acute renal failure as indicated by new onset of ANY ONE of the 

following(9)(10)(11)(12)(13):


          [ ]a)  A 3-fold rise in serum creatinine from baseline


          [ ]b)  Serum creatinine greater than 4 mg/dL (354 micromoles/L) with 

acute rise greater than 0.5 mg/dL (44.2 micromoles/L)


          [ ]c)  Reduction of more than 75% in estimated glomerular filtration 

rate from baseline


          [ ]d)  Estimated glomerular filtration rate less than 35 mL/min/

1.73m2 (0.59 mL/sec/1.73m2) in child up to 18 years of age


          [ ]e)  Cessation of urine output indicated by ALL of the following:


                 [ ]i)    Adequate volume status


                 [ ]ii)   Inadequate urine output as indicated by ANY ONE of 

the following:


                         [ ]1)   Urine output less than 0.3 mL/kg/hr for 24 

hours


                         [ ]2)   Anuria (urine output less than 0.1 mL/kg/hr) 

for 12 hours


[ ]XV.  Significant uremic complications as indicated by ANY ONE of the 

following(58)(59)(60):


          [ ]a)  Outpatient therapy is ineffective or not feasible for ANY ONE 

of the following:


                 [ ]i)    Severe heart failure 


                 [ ]ii)   Severehypertension 


                 [ ]iii)  Pleural effusion


                 [ ]iv)  Pericarditis or pericardial effusion


           [ ]b)  Cardiac arrhythmias of immediate concern     


           [ ]c)  Intractable nausea or vomiting


           [ ]d)  Recurrent seizures 


           [ ]e)  Encephalopathy


           [ ]f)   Bleeding abnormalities (eg, platelet dysfunction) with 

active (eg, gastrointestinal) bleeding 


           [ ]g)  Dialysis indicated before long-term access or ambulatory 

arrangements can be made


           [ ]h)  Significant metabolic or electrolyte abnormalities (eg, 

severe acidosis or hyperkalemia)


[ ]XVI.  High fever or other high-risk infection situation as indicated by ANY 

ONE of the following(61)(62)(63)(64):


           [ ]a)  Outpatient and observation care antimicrobial treatment 

unavailable, not effective, or not appropriate 


           [ ]b)  Documented bacteremia


           [ ]c)  Temperature greater than 40.5 degrees C (104.9 degrees F) (

oral)


           [ ]d)  Temperature greater than 39.5 degrees C (103.1 degrees F) (

oral) or less than 36 degrees C (96.8 degrees F)


                   (rectal) that does not respond to e  treatment and 

observation care


[ ] XVII.  Temperature less than 95 degrees F (35 degrees C)(rectal)(65)


[ ] XVIII. Severe nutritional abnormalities as indicated by ALL of the following

(66)(67):


           [ ]a)  Inability to tolerate or establish sufficient oral or other 

enteral nutrition in outpatient setting 


           [ ]b)  Parenteral nutrition regimen need that must be implemented on 

inpatient basis


[ ] XIX.  Severe electrolyte abnormalities indicated by ALL of the following(68)

(69)(70):


           [ ]a)  Electrolytes and associated findings are not as expected for 

patient baseline or acceptable treatment effects.


           [ ]b)  Severe abnormalities indicated by ANY ONE of the following:


                  [ ]i)  Sodium less than 130 mEq/L (mmol/L) (new)


                  [ ]ii)Sodium less than 135 mEq/L (mmol/L) with ANY ONE of the 

following:


                        [ ]1)   Uncorrectable (to near normal or chronic 

baseline) after trial of outpatient and emergency treatment


                        [ ]2)   Altered mental status


                        [ ]3)   Seizures


                        [ ]4)   Severe medical etiology requiring inpatient 

management (eg, heart failure, hypovolemia)                 


                  [ ]iii)  Sodium greater than 155 mEq/L (mmol/L)


                  [ ]iv)  Sodium greater than 150 mEq/L (mmol/L) with ANY ONE 

of the following:


                        [ ]1)   Uncorrectable (to near normal or chronic 

baseline) with outpatient and emergency treatment


                        [ ]2)   Altered mental status


                        [ ]3)   Seizures


                        [ ]4)   Severe medical etiology (eg, hypovolemia, 

diabetes insipidus)


                  [ ]v)   Potassium less than 2.5 mEq/L (mmol/L) despite 

outpatient and emergency treatment 


                  [ ]vi)  Potassium less than 3 mEq/L (mmol/L) with ANY ONE of 

the following:


                         [ ]1)   Weakness


                         [ ]2)   Cardiac abnormality (eg, arrhythmia, 

conduction disturbance)


                         [ ]3)   Cardiac ischemia


                         [ ]4)   Ileus


                         [ ]5)   Ongoing medical cause requiring inpatient 

management (eg, acute renal wasting or SIADH)


                         [ ]6)   Other severe symptoms                 


                 [ ]vii) Potassium greater than 6.5 mEq/L (mmol/L)


                 [ ]viii) Potassium greater than 5 mEq/L (mmol/L) with ANY ONE 

of the following:


                        [ ]1)   Uncorrectable (to near normal or chronic 

baseline) with outpatient and emergency treatment


                        [ ]2)   Severe ECG findings [F]


                        [ ]3)   Acute worsening of renal failure (creatinine 

greater than 2.5 mg/dL (221 micromoles/L) or significant elevation for age and 

size)


                        [ ]4)   Severe weakness


                        [ ]5)   Severe medical etiology (eg, hemolysis, 

infection, drug overdose)


                 [ ]ix)  Calcium less than 7 mg/dL (1.75 mmol/L) despite 

outpatient and emergency treatment (72)


                 [ ]x)  Calcium less than 8 mg/dL (2 mmol/L) with significant 

symptoms or findings; examples include(72):                       


                         [ ]1)   Altered mental status


                         [ ]2)   Muscle spasms


                         [ ]3)   Seizures


                         [ ]4)   Breathing difficulty


                         [ ]5)   Cardiac abnormality (eg, arrhythmia or 

conduction disturbance)


                [ ]xi)  Calcium greater than 14 mg/dL (3.5 mmol/L)(72)


                [ ]xii) Calcium greater than 12 mg/dL (3 mmol/L) with ANY ONE 

of the following(72):


                         [ ]1)   Uncorrectable (to near normal or chronic 

baseline) with outpatient and emergency treatment


                         [ ]2)   Significant dehydration or hypovolemia as 

indicated by ALL of the following(70)(73)(74):


                                  [ ]A.  Not resolved with initial treatments


                                  [ ]B.  Clinically significant dehydration as 

indicated by ANY ONE of the following:


                                           [ ]a. Vomiting refractory to 

outpatient treatment (ie, precluding oral rehydration)


                                           [ ]b. Inability to drink


                                           [ ]c. Hypernatremia or other 

electrolyte abnormality unable to be corrected with outpatient and emergency 

treatment


                                           [ ]d. Failure to remain hydrated 

with outpatient therapy 


                                           [ ]e. Reduced urine output


                                           [ ]f. Hypotension


                                           [ ]g. Serious cause for dehydration 

requiring acute hospitalization (eg, bowel obstruction, increased 


                                                  intracranial pressure, 

infectious cause)


                                           [ ]h. Child with ANY ONE of the 

following(75):


                                                  [ ]1)  Severe abdominal 

tenderness


                                                  [ ]2)  Adequate care not 

available at home     


                                                  [ ]3)  Severe dehydration (

greater than 9% loss of body weight)


                                                  [ ]4)  Significant symptoms 

or findings; examples include: 


                                                          [ ]A. Altered mental 

status


                                                          [ ]B. Cardiac 

abnormality (eg, arrhythmia, conduction disturbance) 


                                                          [ ]C. Malignant 

etiology requiring inpatient treatment


                [ ]xiii)  Phosphorus less than 1 mg/dL (0.32 mmol/L)


                [ ]xiv)  Phosphorus less than 1.5 mg/dL (0.48 mmol/L) with ANY 

ONE of the following:


                          [ ]1)   Patient unresponsive to outpatient and 

emergency treatment


                          [ ]2)   Significant symptoms or findings; examples 

include: 


                                  [ ]A.  Weakness


                                  [ ]B. Altered mental status 


                                  [ ]C. Breathing difficulty 


                                  [ ]D. Seizures


                                  [ ]E. Rhabdomyolysis


                [ ]xv)   Phosphorus greater than 10 mg/dL (3.2 mmol/L)


                [ ]xvi)  Phosphorus greater than 4.5 mg/dL (1.45 mmol/L) (new) 

with ANY ONE of the following:


                          [ ]1)   Severe medical etiology (eg, crush injury, 

acute renal failure)


                          [ ]2)   Associated hypocalcemia with significant 

findings; examples include: 


                                  [ ]A.    Neurologic symptoms


                                  [ ]B.    Altered mental status


                                  [ ]C.    Muscle spasms


                                  [ ]D.    Seizures


                                  [ ]E.    Breathing difficulty


                                  [ ]F.    Cardiac abnormality (eg, arrhythmia, 

conduction disturbance)


                [ ]xvii)   Magnesium less than 1 mg/dL (0.41 mmol/L)


                [ ]xviii)  Magnesium less than 1.5 mg/dL (0.62 mmol/L) with ANY 

ONE of the following:


                           [ ]1)   Patient unresponsive to outpatient and 

emergency treatment


                           [ ]2)   Associated hypocalcemia with significant 

findings; examples include: 


                                   [ ]A.    Altered mental status


                                   [ ]B.    Muscle spasms


                                   [ ]C.    Seizures


                                   [ ]D.    Breathing difficulty


                                   [ ]E.    Cardiac abnormality (eg, arrhythmia

, conduction disturbance)


                           [ ]3)   Associated hypokalemia (potassium less than 

3 mEq/L (mmol/L)) with risk of arrhythmia 


                [ ]xix)  Magnesium greater than 4 mEq/L (2 mmol/L) 


                [ ]xx)   Magnesium greater than 2.5 mEq/L (1.25 mmol/L) with 

significant symptoms or findings; examples include:


                          [ ]1)   Weakness


                          [ ]2)   Altered mental status


                          [ ]3)   Cardiac abnormality (eg, arrhythmia, 

conduction disturbance)


                          [ ]4)   Breathing difficulty


                          [ ]5)   Severe medical etiology (eg, renal failure, 

hypovolemia)


               [ ]xxi)   Uric acid greater than 20 mg/dL (1190 micromoles/L)(76)


               [ ]xxii)  Uric acid greater than 8 mg/dL (476 micromoles/L) with 

significant symptoms or findings of tumor


                          lysis syndrome; examples include(76):


                          [ ]1)   Creatinine greater than 1.5 times upper limit 

of normal


                          [ ]2)   Cardiac abnormality (eg, arrhythmia, 

conduction disturbance)


                          [ ]3)   Seizure


[ ]XX.   Acute blood loss causing significant abnormality as indicated by ANY 

ONE of the following(77)(78):


         [ ]a)  Hemoglobin less than 10 g/dL (100 g/L) (not baseline)


         [ ]b)  Hematocrit less than 30% (0.30) (not baseline)


         [ ]c)  Repeat hematocrit decreased more than 2% (0.02)


         [ ]d)  Uncontrolled bleeding


[ ]XXI. Severe anemia indicated by ANY ONE of the following(78)(79):


         [ ]a)  Altered mental status 


         [ ]b)  Chest pain


         [ ]c)  Exertional dyspnea 


         [ ]d)  Syncope


         [ ]e)  Other findings suggesting inadequate perfusion


         [ ]f)   Treatment with transfusion or volume replacement is 

ineffective at resolving ANY ONE of the following [G]:


                 [ ]i)    Tachycardia for age


                 [ ]ii)   Orthostatic vital sign changes as indicated by ANY ONE

 of the following(80):


                         [ ]1)    Fall in SBP of 20 mm Hg or more 1 to 3 

minutes after patient sits or stands from recumbent position


                         [ ]2)    Fall in DBP of 10 mm Hg or more 1 to 3 

minutes after patient sits or stands from recumbent position


[ ]XXII. High-risk low platelet count as indicated by ANY ONE of the following(

81)(82):


          [ ]a)  Severe or life-threatening bleeding (eg, intracranial, major 

gastrointestinal, or extensive mucosal bleeding),


                  with any reduced platelet count


          [ ]b)  Platelet count less than 20,000/mm3 (20 x109/L) with any 

active bleeding


          [ ]c)  Platelet count less than 10,000/mm3 (10 x109/L) with minor 

purpura or petechiae 


          [ ]d)  Platelet count less than 5000/mm3 (5 x109/L)


          [ ]e)  Low platelet count with hemolytic anemia


[ ]XXIII.  Disseminated intravascular coagulation(77)(83)


[ ]XXIV. Severe adverse drug or systemic toxin reaction requiring inpatient 

treatment; examples include(84)(85):          


          [ ]a)  Serotonin syndrome(86)


          [ ]b)  Neuroleptic malignant syndrome(86)


          [ ]c)  Cholinergic syndrome with severe symptoms (eg, bronchorrhea, 

weakness, mental status changes, seizures)


          [ ]d)  Sympathetic syndrome with severe symptoms (eg, seizures, 

mental status changes, cardiac dysrhythmias)


          [ ]e)   Anticholinergic syndrome


[ ]XXV.    Severe pain requiring acute inpatient management as indicated by ALL 

of the following (87)(88)(89):        


          [ ]a)  Continuous or frequent (eg, every 2 to 4 hours) parenteral 

analgesics required [H]


          [ ]b)  Rapid improvement expected from treatment or acute 

intervention (eg, surgery, anesthesia procedure)


[ ]XXVI.Severe behavioral health issues judged unmanageable at a lower level of 

care (eg, residential) in a 


          patient who is ANY ONE of the following(91)          


          [ ]a)  Acutely suicidal


          [ ]b)  A danger to self (eg, self-mutilating or suicidal behavior)


          [ ]c)  A danger to others (eg, assaultive or homicidal behavior)


          [ ]d)   Incapacitated because of grave disability (eg, inability to 

provide for self at lower level of care) (92) 


[X]XXVII. Inpatient monitoring needed; examples include(1)(3)(87)(93)(94)(95)(96

):


          [X]a)  Vital signs, neurologic signs, or vascular checks more 

frequently than every 4 hours


          [ ]b)  Cardiac or respiratory monitoring beyond the scope (eg, over 

24 hours) of observation care


          [ ]c)  Pulmonary artery catheter monitoring


          [ ]d)  Suspected compartment syndrome(97) (98)


          [ ]e)  Cerebral bleeding, hydrocephalus, or vasospasm monitoring


          [ ]f)   Increased intracranial pressure or cerebral edema monitoring


          [ ]g)  Fetal monitoring


[ ]XXVIII. Treatment requiring inpatient care; examples include:


          [ ]a)  IV fluid to replace significant ongoing losses (greater than 3 

L/m2 per day)(53)


          [ ]b)  High concentration oxygen (greater than 40%)(33)(99)(100)


          [ ]c)  Frequent respiratory therapy (more frequently than every 4 

hours) to maintain airflow rates greater 


                  than 60% of baseline(33)(99)(100)


          [ ]d)  Epidural analgesia(87)


          [ ]e)  IV anticoagulation, vasoactive, or antiarrhythmic medication(19

)(23)


          [ ]f)   Acute thrombolytics (generally require 24 hours of observation

)(101)(102)


[ ]XXIX.  Emergency procedures needed; examples include:


          [ ]a)  Emergency inpatient surgery


          [ ]b)  Temporary pacemaker placement(103)


          [ ]c)  Chest tube placement with active evacuation (eg, suction, 

drainage)(104)


          [ ]d)   Emergent cardioversion(105)


          [ ]e)  Emergent cardiac or vascular procedures (eg, cardiac 

catheterization, angioplasty) (17)(18)


          [ ]f)   Emergent dialysis access placement and institution(10)(106)


          [ ]g)  Emergent pericardiocentesis(107)


          [ ]h)  Emergent plasmapheresis or leukapheresis(83)


          [ ]i)   Emergent tracheostomy








The original [employer] content created by [employer] has been revised. 


The portions of the content which have been revised are identified through the 

use of italic text or in bold, and [facility] 


has neither reviewed nor approved the modified material. All other unmodified 

content is copyright  [employer].





Please see references footnoted in the original [facility] edition 

2016


Admission Criteria Met?:  Yes











ARNOLDO ROMERO Jul 11, 2017 06:05

## 2017-07-11 NOTE — PDOC
PROGRESS NOTES


Subjective


Subjective


Mr Mcallister has maintained stable vitals. Reports no episodes of chest pain or 

shortness of breath. His only complaint is recurrent headaches.





Objective


Objective





Vital Signs








  Date Time  Temp Pulse Resp B/P (MAP) Pulse Ox O2 Delivery O2 Flow Rate FiO2


 


7/11/17 08:25  74  135/95    


 


7/11/17 07:00 97.4  18  94 Room Air  





 97.4       


 


7/9/17 06:00       2.0 














Intake and Output 


 


 7/11/17





 07:00


 


Intake Total 1650 ml


 


Output Total 2550 ml


 


Balance -900 ml


 


 


 


Intake Oral 1650 ml


 


Output Urine Total 2550 ml


 


# Voids 1


 


# Bowel Movements 1











Physical Exam


Heart:  Regular rate (and rhythm), Other (systolic murmur unchanged from 

previous exam)


Extremities:  No edema, Normal pulses (2/4 radial b/l)


General:  Alert, No acute distress


Lungs:  Clear to auscultation (b/l), Normal air movement





Assessment


Assessment


Mr Mcallister is having no chest pain or shortness of breath, and his holding 

sinus rhythm. 





His main concern is the headaches for which he was seen by neurology.








Problems


Medical Problems:


(1) Elevated brain natriuretic peptide (BNP) level


Status: Acute  





(2) Hypomagnesemia


Status: Acute  





(3) Hypotension


Status: Acute  





(4) V-tach


Status: Acute  








Comment


Review of Relevant


I have reviewed the following items nirmal (where applicable) has been applied.


Labs





Laboratory Tests








Test


  7/9/17


11:17 7/9/17


17:11 7/9/17


21:00 7/10/17


03:45


 


Glucose (Fingerstick)


  108 mg/dL


(70-99) 92 mg/dL


(70-99) 215 mg/dL


(70-99) 


 


 


White Blood Count


  


  


  


  6.8 x10^3/uL


(4.0-11.0)


 


Red Blood Count


  


  


  


  3.48 x10^6/uL


(4.30-5.70)


 


Hemoglobin


  


  


  


  10.0 g/dL


(13.0-17.5)


 


Hematocrit


  


  


  


  29.3 %


(39.0-53.0)


 


Mean Corpuscular Volume    84 fL () 


 


Mean Corpuscular Hemoglobin    29 pg (25-35) 


 


Mean Corpuscular Hemoglobin


Concent 


  


  


  34 g/dL


(31-37)


 


Red Cell Distribution Width


  


  


  


  14.3 %


(11.5-14.5)


 


Platelet Count


  


  


  


  141 x10^3/uL


(140-400)


 


Sodium Level


  


  


  


  142 mmol/L


(136-145)


 


Potassium Level


  


  


  


  4.0 mmol/L


(3.5-5.1)


 


Chloride Level


  


  


  


  103 mmol/L


()


 


Carbon Dioxide Level


  


  


  


  32 mmol/L


(21-32)


 


Anion Gap    7 (6-14) 


 


Blood Urea Nitrogen


  


  


  


  45 mg/dL


(8-26)


 


Creatinine


  


  


  


  1.8 mg/dL


(0.7-1.3)


 


Estimated GFR


(Cockcroft-Gault) 


  


  


  37.7 


 


 


Glucose Level


  


  


  


  61 mg/dL


(70-99)


 


Calcium Level


  


  


  


  8.4 mg/dL


(8.5-10.1)


 


Test


  7/10/17


07:55 7/10/17


11:03 7/10/17


17:03 7/10/17


20:38


 


Glucose (Fingerstick)


  81 mg/dL


(70-99) 147 mg/dL


(70-99) 99 mg/dL


(70-99) 223 mg/dL


(70-99)


 


Test


  7/11/17


07:32 


  


  


 


 


Glucose (Fingerstick)


  108 mg/dL


(70-99) 


  


  


 








Laboratory Tests








Test


  7/10/17


11:03 7/10/17


17:03 7/10/17


20:38 7/11/17


07:32


 


Glucose (Fingerstick)


  147 mg/dL


(70-99) 99 mg/dL


(70-99) 223 mg/dL


(70-99) 108 mg/dL


(70-99)








Medications





Current Medications


Amiodarone HCl 150 mg/Dextrose 103 ml @  618 mls/hr 1X  ONCE IV  Last 

administered on 7/8/17at 10:56;  Start 7/8/17 at 10:30;  Stop 7/8/17 at 10:39;  

Status DC


Amiodarone HCl 900 mg/Dextrose 518 ml @ 0 mls/hr CONT  PRN IV SEE I/O RECORD 

Last administered on 7/8/17at 11:27;  Start 7/8/17 at 10:30;  Stop 7/8/17 at 11:

28;  Status DC


Aspirin (Ecotrin) 325 mg 1X  ONCE PO  Last administered on 7/8/17at 10:56;  

Start 7/8/17 at 10:30;  Stop 7/8/17 at 10:31;  Status DC


Sodium Chloride 1,000 ml @  1,000 mls/hr 1X  ONCE IV  Last administered on 7/8/ 17at 11:00;  Start 7/8/17 at 11:00;  Stop 7/8/17 at 11:59;  Status DC


Ondansetron HCl (Zofran) 4 mg PRN Q8HRS  PRN IV NAUSEA/VOMITING;  Start 7/8/17 

at 11:00;  Stop 7/9/17 at 10:59;  Status DC


Fentanyl Citrate (Fentanyl 2ml Vial) 50 mcg PRN Q2HR  PRN IV PAIN;  Start 7/8/ 17 at 11:00;  Stop 7/9/17 at 10:59;  Status DC


Magnesium Oxide (Magnesium Oxide) 800 mg 1X  STAT PO ;  Start 7/8/17 at 11:10;  

Stop 7/8/17 at 11:11;  Status DC


Magnesium Sulfate/ Dextrose 50 ml @ 25 mls/hr 1X  ONCE IV  Last administered on 

7/8/17at 11:58;  Start 7/8/17 at 11:45;  Stop 7/8/17 at 13:44;  Status DC


Atorvastatin Calcium (Lipitor) 40 mg HS PO  Last administered on 7/10/17at 20:44

;  Start 7/8/17 at 21:00


Furosemide (Lasix) 80 mg DAILY PO  Last administered on 7/9/17at 07:30;  Start 7 /8/17 at 14:00;  Stop 7/10/17 at 09:01;  Status DC


Glipizide (Glucotrol) 2.5 mg BID PO  Last administered on 7/9/17at 20:47;  

Start 7/8/17 at 21:00;  Stop 7/10/17 at 09:00;  Status DC


Metformin HCl (Glucophage) 1,000 mg BIDWMEALS PO  Last administered on 7/9/17at 

17:53;  Start 7/8/17 at 17:00;  Stop 7/10/17 at 09:00;  Status DC


Pantoprazole Sodium (Protonix) 40 mg BIDAC PO  Last administered on 7/11/17at 08

:25;  Start 7/8/17 at 16:30


Sertraline HCl (Zoloft) 50 mg DAILY PO  Last administered on 7/11/17at 08:26;  

Start 7/8/17 at 14:00


Sotalol HCl (Betapace) 40 mg BID PO  Last administered on 7/11/17at 08:24;  

Start 7/8/17 at 14:00


Acetaminophen/ Aspirin/Caffeine (Excedrin Migraine) 1 tab BID PO  Last 

administered on 7/10/17at 20:44;  Start 7/8/17 at 21:00


Potassium Chloride (Klor-Con) 20 meq BIDWMEALS PO  Last administered on 7/9/ 17at 17:55;  Start 7/8/17 at 17:00;  Stop 7/10/17 at 09:01;  Status DC


Insulin Aspart (NovoLOG) 0-5 UNITS TIDWMEALS SQ  Last administered on 7/8/17at 

14:27;  Start 7/8/17 at 14:00


Dextrose (Dextrose 50%-Water Syringe) 12.5 gm PRN Q15MIN  PRN IV SEE COMMENTS;  

Start 7/8/17 at 13:45;  Status Cancel


Alprazolam (Xanax) 0.25 mg PRN Q8HRS  PRN PO ANXIETY / AGITATION;  Start 7/8/17 

at 13:45


Norepinephrine Bitartrate 250 ml @ 0 mls/hr CONT  PRN IV SEE I/O RECORD Last 

administered on 7/8/17at 15:18;  Start 7/8/17 at 15:15


Amiodarone HCl (Cordarone) 400 mg BID PO  Last administered on 7/11/17at 08:25;

  Start 7/8/17 at 18:00


Ibuprofen (Motrin) 400 mg PRN TID  PRN PO headache Last administered on 7/11/ 17at 08:24;  Start 7/8/17 at 20:15


Dextrose 500 ml @  500 mls/hr 1X  ONCE IV ;  Start 7/10/17 at 06:30;  Stop 7/10/

17 at 07:29;  Status Cancel


Glipizide (Glucotrol) 2.5 mg DAILYWBKFT PO  Last administered on 7/10/17at 09:19

;  Start 7/11/17 at 08:00


Acetaminophen/ Butalbital/ Caffeine (Fioricet) 1 tab PRN Q6HRS  PRN PO MIGRAINE 

HEADACHE;  Start 7/10/17 at 15:00


Escitalopram Oxalate (Lexapro) 10 mg DAILY PO  Last administered on 7/11/17at 08

:24;  Start 7/10/17 at 15:00





Active Scripts


Active


Pantoprazole Sodium 40 Mg Tablet.dr 40 Mg PO BIDAC


Reported


Stephanie Back & Body Caplet (Aspirin/Caffeine) 1 Each Tablet 2 Each PO BID


Zoloft (Sertraline Hcl) 50 Mg Tablet 1 Tab PO DAILY


Glipizide 5 Mg Tablet 0.5 Tab PO BID


Potassium Chloride 20 Meq Tablet.er 20 Meq PO BID


     last dose this am


     next dose with supper


Sotalol (Sotalol Hcl) 80 Mg Tablet 40 Mg PO BID


     last dose this am


     next dose tonight


Furosemide 80 Mg Tablet 80 Mg PO DAILY


     last dose this am


     next dose tomorrow


Atorvastatin Calcium 40 Mg Tablet 40 Mg PO HS


     last dose was last dose


     next dose tonight


Metformin Hcl 1,000 Mg Tablet 1,000 Mg PO BID


     last dose this am


     next dose with supper


Vitals/I & O





Vital Sign - Last 24 Hours








 7/10/17 7/10/17 7/10/17 7/10/17





 09:17 09:20 12:00 15:00


 


Temp   98.0 97.5





   98.0 97.5


 


Pulse 66 66 63 77


 


Resp   18 18


 


B/P (MAP) 135/81 135/81 117/73 (88) 105/63 (77)


 


Pulse Ox   97 97


 


O2 Delivery   Room Air 


 


    





    





 7/10/17 7/10/17 7/10/17 7/10/17





 19:35 19:57 20:44 20:46


 


Temp  98.2  





  98.2  


 


Pulse  86 86 86


 


Resp  20  


 


B/P (MAP)  132/81 (98) 132/81 132/81


 


Pulse Ox  90  


 


O2 Delivery Room Air Room Air  


 


    





    





 7/10/17 7/11/17 7/11/17 7/11/17





 23:14 03:00 07:00 08:24


 


Temp 98.0 97.7 97.4 





 98.0 97.7 97.4 


 


Pulse 72 65 56 74


 


Resp 18 18 18 


 


B/P (MAP) 121/76 (91) 116/63 (80) 135/95 (108) 135/95


 


Pulse Ox 94 93 94 


 


O2 Delivery Room Air Room Air Room Air 


 


    





    





 7/11/17   





 08:25   


 


Pulse 74   


 


B/P (MAP) 135/95   














Intake and Output   


 


 7/10/17 7/10/17 7/11/17





 15:00 23:00 07:00


 


Intake Total  1200 ml 450 ml


 


Output Total 300 ml 1225 ml 1025 ml


 


Balance -300 ml -25 ml -575 ml

















BRITTNEE AMAYA MD Jul 11, 2017 09:20

## 2017-07-11 NOTE — PDOC
PROGRESS NOTES


Assessment


Problems


Medical Problems:


(1) Elevated brain natriuretic peptide (BNP) level


Status: Acute  





(2) Hypomagnesemia


Status: Acute  





(3) Hypotension


Status: Acute  





(4) V-tach


Status: Acute  





Migraine headaches


Peripheral neuropathy


Mild cognitive impairment


Plan


Ibuprofen, Excedrin, or Fioricet as needed.


Limit pain medication use to no more than 2 days a week


Escitalopram 10 mg daily


Hold off on additional studies.


Follow-up with me in 6 weeks.


Subjective


Has a bad headache this morning, just received medication


Objective





Vital Signs








  Date Time  Temp Pulse Resp B/P (MAP) Pulse Ox O2 Delivery O2 Flow Rate FiO2


 


7/11/17 11:00 97.4 61 18 111/70 (84) 95 Room Air  





 97.4       


 


7/11/17 08:00       2.0 














Intake and Output 


 


 7/11/17





 07:00


 


Intake Total 1650 ml


 


Output Total 2550 ml


 


Balance -900 ml


 


 


 


Intake Oral 1650 ml


 


Output Urine Total 2550 ml


 


# Voids 1


 


# Bowel Movements 1








PHYSICAL EXAM


Alert. Oriented to time, place and person.


PERRL.


EOMI.


CN: Has chronic vision and hearing loss, otherwise no focal findings.


Muscle tone: normal.


Muscle strength: 4/5


DTR: 1+


Plantar reflex: flexor


Gait: not examined in bed.


Sensory exam: no abnormal findings.


No cerebellar signs elicited.


Review of Relevant


I have reviewed the following items nirmal (where applicable) has been applied.


Labs





Laboratory Tests








Test


  7/9/17


17:11 7/9/17


21:00 7/10/17


03:45 7/10/17


07:55


 


Glucose (Fingerstick)


  92 mg/dL


(70-99) 215 mg/dL


(70-99) 


  81 mg/dL


(70-99)


 


White Blood Count


  


  


  6.8 x10^3/uL


(4.0-11.0) 


 


 


Red Blood Count


  


  


  3.48 x10^6/uL


(4.30-5.70) 


 


 


Hemoglobin


  


  


  10.0 g/dL


(13.0-17.5) 


 


 


Hematocrit


  


  


  29.3 %


(39.0-53.0) 


 


 


Mean Corpuscular Volume   84 fL ()  


 


Mean Corpuscular Hemoglobin   29 pg (25-35)  


 


Mean Corpuscular Hemoglobin


Concent 


  


  34 g/dL


(31-37) 


 


 


Red Cell Distribution Width


  


  


  14.3 %


(11.5-14.5) 


 


 


Platelet Count


  


  


  141 x10^3/uL


(140-400) 


 


 


Sodium Level


  


  


  142 mmol/L


(136-145) 


 


 


Potassium Level


  


  


  4.0 mmol/L


(3.5-5.1) 


 


 


Chloride Level


  


  


  103 mmol/L


() 


 


 


Carbon Dioxide Level


  


  


  32 mmol/L


(21-32) 


 


 


Anion Gap   7 (6-14)  


 


Blood Urea Nitrogen


  


  


  45 mg/dL


(8-26) 


 


 


Creatinine


  


  


  1.8 mg/dL


(0.7-1.3) 


 


 


Estimated GFR


(Cockcroft-Gault) 


  


  37.7 


  


 


 


Glucose Level


  


  


  61 mg/dL


(70-99) 


 


 


Calcium Level


  


  


  8.4 mg/dL


(8.5-10.1) 


 


 


Test


  7/10/17


11:03 7/10/17


17:03 7/10/17


20:38 7/11/17


07:32


 


Glucose (Fingerstick)


  147 mg/dL


(70-99) 99 mg/dL


(70-99) 223 mg/dL


(70-99) 108 mg/dL


(70-99)


 


Test


  7/11/17


09:45 


  


  


 


 


Sodium Level


  141 mmol/L


(136-145) 


  


  


 


 


Potassium Level


  4.2 mmol/L


(3.5-5.1) 


  


  


 


 


Chloride Level


  101 mmol/L


() 


  


  


 


 


Carbon Dioxide Level


  31 mmol/L


(21-32) 


  


  


 


 


Anion Gap 9 (6-14)    


 


Blood Urea Nitrogen


  39 mg/dL


(8-26) 


  


  


 


 


Creatinine


  1.5 mg/dL


(0.7-1.3) 


  


  


 


 


Estimated GFR


(Cockcroft-Gault) 46.5 


  


  


  


 


 


Glucose Level


  205 mg/dL


(70-99) 


  


  


 


 


Calcium Level


  8.8 mg/dL


(8.5-10.1) 


  


  


 








Laboratory Tests








Test


  7/10/17


17:03 7/10/17


20:38 7/11/17


07:32 7/11/17


09:45


 


Glucose (Fingerstick)


  99 mg/dL


(70-99) 223 mg/dL


(70-99) 108 mg/dL


(70-99) 


 


 


Sodium Level


  


  


  


  141 mmol/L


(136-145)


 


Potassium Level


  


  


  


  4.2 mmol/L


(3.5-5.1)


 


Chloride Level


  


  


  


  101 mmol/L


()


 


Carbon Dioxide Level


  


  


  


  31 mmol/L


(21-32)


 


Anion Gap    9 (6-14) 


 


Blood Urea Nitrogen


  


  


  


  39 mg/dL


(8-26)


 


Creatinine


  


  


  


  1.5 mg/dL


(0.7-1.3)


 


Estimated GFR


(Cockcroft-Gault) 


  


  


  46.5 


 


 


Glucose Level


  


  


  


  205 mg/dL


(70-99)


 


Calcium Level


  


  


  


  8.8 mg/dL


(8.5-10.1)








Medications





Current Medications


Amiodarone HCl 150 mg/Dextrose 103 ml @  618 mls/hr 1X  ONCE IV  Last 

administered on 7/8/17at 10:56;  Start 7/8/17 at 10:30;  Stop 7/8/17 at 10:39;  

Status DC


Amiodarone HCl 900 mg/Dextrose 518 ml @ 0 mls/hr CONT  PRN IV SEE I/O RECORD 

Last administered on 7/8/17at 11:27;  Start 7/8/17 at 10:30;  Stop 7/8/17 at 11:

28;  Status DC


Aspirin (Ecotrin) 325 mg 1X  ONCE PO  Last administered on 7/8/17at 10:56;  

Start 7/8/17 at 10:30;  Stop 7/8/17 at 10:31;  Status DC


Sodium Chloride 1,000 ml @  1,000 mls/hr 1X  ONCE IV  Last administered on 7/8/ 17at 11:00;  Start 7/8/17 at 11:00;  Stop 7/8/17 at 11:59;  Status DC


Ondansetron HCl (Zofran) 4 mg PRN Q8HRS  PRN IV NAUSEA/VOMITING;  Start 7/8/17 

at 11:00;  Stop 7/9/17 at 10:59;  Status DC


Fentanyl Citrate (Fentanyl 2ml Vial) 50 mcg PRN Q2HR  PRN IV PAIN;  Start 7/8/ 17 at 11:00;  Stop 7/9/17 at 10:59;  Status DC


Magnesium Oxide (Magnesium Oxide) 800 mg 1X  STAT PO ;  Start 7/8/17 at 11:10;  

Stop 7/8/17 at 11:11;  Status DC


Magnesium Sulfate/ Dextrose 50 ml @ 25 mls/hr 1X  ONCE IV  Last administered on 

7/8/17at 11:58;  Start 7/8/17 at 11:45;  Stop 7/8/17 at 13:44;  Status DC


Atorvastatin Calcium (Lipitor) 40 mg HS PO  Last administered on 7/10/17at 20:44

;  Start 7/8/17 at 21:00


Furosemide (Lasix) 80 mg DAILY PO  Last administered on 7/9/17at 07:30;  Start 7 /8/17 at 14:00;  Stop 7/10/17 at 09:01;  Status DC


Glipizide (Glucotrol) 2.5 mg BID PO  Last administered on 7/9/17at 20:47;  

Start 7/8/17 at 21:00;  Stop 7/10/17 at 09:00;  Status DC


Metformin HCl (Glucophage) 1,000 mg BIDWMEALS PO  Last administered on 7/9/17at 

17:53;  Start 7/8/17 at 17:00;  Stop 7/10/17 at 09:00;  Status DC


Pantoprazole Sodium (Protonix) 40 mg BIDAC PO  Last administered on 7/11/17at 08

:25;  Start 7/8/17 at 16:30


Sertraline HCl (Zoloft) 50 mg DAILY PO  Last administered on 7/11/17at 08:26;  

Start 7/8/17 at 14:00


Sotalol HCl (Betapace) 40 mg BID PO  Last administered on 7/11/17at 08:24;  

Start 7/8/17 at 14:00


Acetaminophen/ Aspirin/Caffeine (Excedrin Migraine) 1 tab BID PO  Last 

administered on 7/10/17at 20:44;  Start 7/8/17 at 21:00


Potassium Chloride (Klor-Con) 20 meq BIDWMEALS PO  Last administered on 7/9/ 17at 17:55;  Start 7/8/17 at 17:00;  Stop 7/10/17 at 09:01;  Status DC


Insulin Aspart (NovoLOG) 0-5 UNITS TIDWMEALS SQ  Last administered on 7/8/17at 

14:27;  Start 7/8/17 at 14:00


Dextrose (Dextrose 50%-Water Syringe) 12.5 gm PRN Q15MIN  PRN IV SEE COMMENTS;  

Start 7/8/17 at 13:45;  Status Cancel


Alprazolam (Xanax) 0.25 mg PRN Q8HRS  PRN PO ANXIETY / AGITATION;  Start 7/8/17 

at 13:45


Norepinephrine Bitartrate 250 ml @ 0 mls/hr CONT  PRN IV SEE I/O RECORD Last 

administered on 7/8/17at 15:18;  Start 7/8/17 at 15:15


Amiodarone HCl (Cordarone) 400 mg BID PO  Last administered on 7/11/17at 08:25;

  Start 7/8/17 at 18:00


Ibuprofen (Motrin) 400 mg PRN TID  PRN PO headache Last administered on 7/11/ 17at 08:24;  Start 7/8/17 at 20:15


Dextrose 500 ml @  500 mls/hr 1X  ONCE IV ;  Start 7/10/17 at 06:30;  Stop 7/10/

17 at 07:29;  Status Cancel


Glipizide (Glucotrol) 2.5 mg DAILYWBKFT PO  Last administered on 7/10/17at 09:19

;  Start 7/11/17 at 08:00


Acetaminophen/ Butalbital/ Caffeine (Fioricet) 1 tab PRN Q6HRS  PRN PO MIGRAINE 

HEADACHE Last administered on 7/11/17at 10:20;  Start 7/10/17 at 15:00


Escitalopram Oxalate (Lexapro) 10 mg DAILY PO  Last administered on 7/11/17at 08

:24;  Start 7/10/17 at 15:00





Active Scripts


Active


Pantoprazole Sodium 40 Mg Tablet.dr 40 Mg PO BIDAC


Reported


Amiodarone Hcl 200 Mg Tablet 1 Tab PO DAILY


Amiodarone Hcl 200 Mg Tablet 400 Mg PO BID


Cardizem Cd (Diltiazem Hcl) 240 Mg Cap.er.24h 1 Cap PO DAILY


Potassium Chloride 20 Meq Tablet.er 20 Meq PO QODAY


Glipizide 5 Mg Tablet 1 Tab PO DAILY


Furosemide 80 Mg Tablet 1 Tab PO QODAY


Zoloft (Sertraline Hcl) 50 Mg Tablet 1 Tab PO DAILY


Atorvastatin Calcium 40 Mg Tablet 40 Mg PO HS


     last dose was last dose


     next dose tonight


Metformin Hcl 1,000 Mg Tablet 1,000 Mg PO BID


     last dose this am


     next dose with supper


Vitals/I & O





Vital Sign - Last 24 Hours








 7/10/17 7/10/17 7/10/17 7/10/17





 12:00 15:00 19:35 19:57


 


Temp 98.0 97.5  98.2





 98.0 97.5  98.2


 


Pulse 63 77  86


 


Resp 18 18  20


 


B/P (MAP) 117/73 (88) 105/63 (77)  132/81 (98)


 


Pulse Ox 97 97  90


 


O2 Delivery Room Air  Room Air Room Air


 


    





    





 7/10/17 7/10/17 7/10/17 7/11/17





 20:44 20:46 23:14 03:00


 


Temp   98.0 97.7





   98.0 97.7


 


Pulse 86 86 72 65


 


Resp   18 18


 


B/P (MAP) 132/81 132/81 121/76 (91) 116/63 (80)


 


Pulse Ox   94 93


 


O2 Delivery   Room Air Room Air


 


    





    





 7/11/17 7/11/17 7/11/17 7/11/17





 07:00 08:00 08:24 08:25


 


Temp 97.4   





 97.4   


 


Pulse 56  74 74


 


Resp 18   


 


B/P (MAP) 135/95 (108)  135/95 135/95


 


Pulse Ox 94   


 


O2 Delivery Room Air Room Air  


 


O2 Flow Rate  2.0  


 


    





    





 7/11/17   





 11:00   


 


Temp 97.4   





 97.4   


 


Pulse 61   


 


Resp 18   


 


B/P (MAP) 111/70 (84)   


 


Pulse Ox 95   


 


O2 Delivery Room Air   














Intake and Output   


 


 7/10/17 7/10/17 7/11/17





 15:00 23:00 07:00


 


Intake Total  1200 ml 450 ml


 


Output Total 300 ml 1225 ml 1025 ml


 


Balance -300 ml -25 ml -575 ml

















ADILENE LINDER MD Jul 11, 2017 11:41

## 2017-07-11 NOTE — PDOC
Provider Note


Provider Note


feels ok, vss- will recheck renal fx off lasix- glucose ok on less meds-











DAPHNEY NAIR MD Jul 11, 2017 08:39

## 2017-07-12 NOTE — PDOC3
Discharge Summary


Visit Information


Date of Discharge:  Jul 11, 2017


Final Diagnosis


Problems


Medical Problems:


(1) Elevated brain natriuretic peptide (BNP) level


Status: Acute  





(2) Hypomagnesemia


Status: Acute  





(3) Hypotension


Status: Acute  





(4) V-tach


Status: Acute  











Brief Hospital Course


Allergies





 Allergies








Coded Allergies Type Severity Reaction Last Updated Verified


 


  Penicillins Allergy Severe RASH AND HIVES 6/8/17 Yes








Vital Signs





Vital Signs








  Date Time  Temp Pulse Resp B/P (MAP) Pulse Ox O2 Delivery O2 Flow Rate FiO2


 


7/11/17 11:00 97.4 61 18 111/70 (84) 95 Room Air  





 97.4       


 


7/11/17 08:00       2.0 








Lab Results





Laboratory Tests








Test


  7/10/17


11:03 7/10/17


17:03 7/10/17


20:38 7/11/17


07:32


 


Glucose (Fingerstick)


  147 mg/dL


(70-99) 99 mg/dL


(70-99) 223 mg/dL


(70-99) 108 mg/dL


(70-99)


 


Test


  7/11/17


09:45 7/11/17


11:52 


  


 


 


Sodium Level


  141 mmol/L


(136-145) 


  


  


 


 


Potassium Level


  4.2 mmol/L


(3.5-5.1) 


  


  


 


 


Chloride Level


  101 mmol/L


() 


  


  


 


 


Carbon Dioxide Level


  31 mmol/L


(21-32) 


  


  


 


 


Anion Gap 9 (6-14)    


 


Blood Urea Nitrogen


  39 mg/dL


(8-26) 


  


  


 


 


Creatinine


  1.5 mg/dL


(0.7-1.3) 


  


  


 


 


Estimated GFR


(Cockcroft-Gault) 46.5 


  


  


  


 


 


Glucose Level


  205 mg/dL


(70-99) 


  


  


 


 


Calcium Level


  8.8 mg/dL


(8.5-10.1) 


  


  


 


 


Glucose (Fingerstick)


  


  110 mg/dL


(70-99) 


  


 








Laboratory Tests








Test


  7/11/17


09:45 7/11/17


11:52


 


Sodium Level


  141 mmol/L


(136-145) 


 


 


Potassium Level


  4.2 mmol/L


(3.5-5.1) 


 


 


Chloride Level


  101 mmol/L


() 


 


 


Carbon Dioxide Level


  31 mmol/L


(21-32) 


 


 


Anion Gap 9 (6-14)  


 


Blood Urea Nitrogen


  39 mg/dL


(8-26) 


 


 


Creatinine


  1.5 mg/dL


(0.7-1.3) 


 


 


Estimated GFR


(Cockcroft-Gault) 46.5 


  


 


 


Glucose Level


  205 mg/dL


(70-99) 


 


 


Calcium Level


  8.8 mg/dL


(8.5-10.1) 


 


 


Glucose (Fingerstick)


  


  110 mg/dL


(70-99)








Brief Hospital Course


Mr. Mcallister  is a 67 old [sex] who presented with [ ] no sustained VT, all 

labs ok incl tsh, a1c 6.2- added iv and po amiodarone, good result ,a and 

sotalol stopped- dr griggs added lexapro re HAs , was doing well at dc





Discharge Information


Scheduled


Amiodarone Hcl (Amiodarone Hcl), 400 MG PO BID, (Reported)


Amiodarone Hcl (Amiodarone Hcl), 1 TAB PO DAILY, (Reported)


Atorvastatin Calcium (Atorvastatin Calcium), 40 MG PO HS, (Reported)


Diltiazem Hcl (Cardizem Cd), 1 CAP PO DAILY, (Reported)


Furosemide (Furosemide), 1 TAB PO QODAY, (Reported)


Glipizide (Glipizide), 1 TAB PO DAILY, (Reported)


Metformin Hcl (Metformin Hcl), 1,000 MG PO BID, (Reported)


Pantoprazole Sodium (Pantoprazole Sodium), 40 MG PO BIDAC


Potassium Chloride (Potassium Chloride), 20 MEQ PO QODAY, (Reported)


Sertraline Hcl (Zoloft), 1 TAB PO DAILY, (Reported)





Discontinued Medications


Aspirin/Caffeine (Stephanie Back & Body Caplet), 2 EACH PO BID, (Reported)


Furosemide (Furosemide), 80 MG PO DAILY, (Reported)


Glipizide (Glipizide), 0.5 TAB PO BID, (Reported)


Potassium Chloride (Potassium Chloride), 20 MEQ PO BID, (Reported)


Sotalol Hcl (Sotalol), 40 MG PO BID, (Reported)











DAPHNEY NAIR MD Jul 12, 2017 08:00

## 2018-02-10 ENCOUNTER — HOSPITAL ENCOUNTER (INPATIENT)
Dept: HOSPITAL 61 - ER | Age: 68
LOS: 4 days | Discharge: HOME HEALTH | DRG: 309 | End: 2018-02-14
Attending: FAMILY MEDICINE | Admitting: FAMILY MEDICINE
Payer: MEDICARE

## 2018-02-10 DIAGNOSIS — Z82.49: ICD-10-CM

## 2018-02-10 DIAGNOSIS — I44.7: ICD-10-CM

## 2018-02-10 DIAGNOSIS — E78.5: ICD-10-CM

## 2018-02-10 DIAGNOSIS — I08.0: ICD-10-CM

## 2018-02-10 DIAGNOSIS — E66.01: ICD-10-CM

## 2018-02-10 DIAGNOSIS — N32.0: ICD-10-CM

## 2018-02-10 DIAGNOSIS — E11.42: ICD-10-CM

## 2018-02-10 DIAGNOSIS — Z96.659: ICD-10-CM

## 2018-02-10 DIAGNOSIS — Z87.11: ICD-10-CM

## 2018-02-10 DIAGNOSIS — N18.3: ICD-10-CM

## 2018-02-10 DIAGNOSIS — F41.9: ICD-10-CM

## 2018-02-10 DIAGNOSIS — I48.0: ICD-10-CM

## 2018-02-10 DIAGNOSIS — E11.22: ICD-10-CM

## 2018-02-10 DIAGNOSIS — Z91.19: ICD-10-CM

## 2018-02-10 DIAGNOSIS — M06.9: ICD-10-CM

## 2018-02-10 DIAGNOSIS — I13.0: ICD-10-CM

## 2018-02-10 DIAGNOSIS — N40.1: ICD-10-CM

## 2018-02-10 DIAGNOSIS — F32.9: ICD-10-CM

## 2018-02-10 DIAGNOSIS — I47.2: Primary | ICD-10-CM

## 2018-02-10 DIAGNOSIS — Z79.4: ICD-10-CM

## 2018-02-10 DIAGNOSIS — R33.8: ICD-10-CM

## 2018-02-10 DIAGNOSIS — I50.32: ICD-10-CM

## 2018-02-10 DIAGNOSIS — F03.90: ICD-10-CM

## 2018-02-10 DIAGNOSIS — I25.10: ICD-10-CM

## 2018-02-10 DIAGNOSIS — Z88.0: ICD-10-CM

## 2018-02-10 LAB
ADD MAN DIFF?: NO
AGAP ISTAT: 15 MMOL/L (ref 6–14)
ALBUMIN SERPL-MCNC: 3.8 G/DL (ref 3.4–5)
ALP SERPL-CCNC: 86 U/L (ref 46–116)
ALT (SGPT): 40 U/L (ref 16–63)
ANION GAP SERPL CALC-SCNC: 8 MMOL/L (ref 6–14)
AST SERPL-CCNC: 29 U/L (ref 15–37)
BACTERIA,URINE: 0 /HPF
BASO #: 0 X10^3/UL (ref 0–0.2)
BASO %: 0 % (ref 0–3)
BILIRUB DIRECT SERPL-MCNC: 0.2 MG/DL (ref 0–0.2)
BILIRUBIN,URINE: NEGATIVE
BLOOD UREA NITROGEN: 31 MG/DL (ref 8–26)
BUN ISTAT: 31 MG/DL (ref 8–26)
CALCIUM: 9.1 MG/DL (ref 8.5–10.1)
CHLORIDE SERPL-SCNC: 98 MMOL/L (ref 98–110)
CHLORIDE: 98 MMOL/L (ref 98–107)
CLARITY,URINE: CLEAR
CO2 SERPL-SCNC: 32 MMOL/L (ref 21–32)
COLOR,URINE: YELLOW
CREAT SERPL-MCNC: 1.6 MG/DL (ref 0.7–1.3)
CREATININE ISTAT: 1.6 MG/DL (ref 0.5–1.4)
EOS #: 0.1 X10^3/UL (ref 0–0.7)
EOS %: 2 % (ref 0–3)
GFR SERPLBLD BASED ON 1.73 SQ M-ARVRAT: 43.2 ML/MIN
GLUCOSE BLD-MCNC: 176 MG/DL (ref 70–99)
GLUCOSE SERPL-MCNC: 178 MG/DL (ref 70–99)
GLUCOSE,URINE: NEGATIVE MG/DL
HCG SERPL-ACNC: 7.4 X10^3/UL (ref 4–11)
HEMATOCRIT ISTAT: 32 % (ref 37–52)
HEMATOCRIT: 33.2 % (ref 39–53)
HEMOGLOBIN ISTAT: 10.9 G/DL (ref 14–18)
HEMOGLOBIN: 11.1 G/DL (ref 13–17.5)
ION CA ISTAT: 1.1 MMOL/L (ref 1.13–1.32)
LACTIC ACID: 2.1 MMOL/L (ref 0.4–2)
LIPASE: 123 U/L (ref 73–393)
LYMPH #: 1.4 X10^3/UL (ref 1–4.8)
LYMPH %: 20 % (ref 24–48)
MEAN CORPUSCULAR HEMOGLOBIN: 29 PG (ref 25–35)
MEAN CORPUSCULAR HGB CONC: 33 G/DL (ref 31–37)
MEAN CORPUSCULAR VOLUME: 87 FL (ref 79–100)
MONO #: 0.4 X10^3/UL (ref 0–1.1)
MONO %: 6 % (ref 0–9)
NEUT #: 5.4 X10^3UL (ref 1.8–7.7)
NEUT %: 73 % (ref 31–73)
NITRITE,URINE: NEGATIVE
NT-PRO BNP: 1719 PG/ML (ref 0–124)
PH,URINE: 7
PLATELET COUNT: 187 X10^3/UL (ref 140–400)
POC GLUCOSE: 103 MG/DL (ref 70–99)
POC GLUCOSE: 152 MG/DL (ref 70–99)
POC GLUCOSE: 163 MG/DL (ref 70–99)
POC GLUCOSE: 192 MG/DL (ref 70–99)
POTASSIUM ISTAT: 4 MMOL/L (ref 3.5–5)
POTASSIUM SERPL-SCNC: 3.8 MMOL/L (ref 3.5–5.1)
PROTEIN,URINE: 30 MG/DL
RBC,URINE: 0 /HPF (ref 0–2)
RED BLOOD COUNT: 3.8 X10^6/UL (ref 4.3–5.7)
RED CELL DISTRIBUTION WIDTH: 14.6 % (ref 11.5–14.5)
SODIUM SERPL-SCNC: 138 MMOL/L (ref 135–145)
SODIUM: 138 MMOL/L (ref 136–145)
SPECIFIC GRAVITY,URINE: 1.01
SQUAMOUS EPITHELIAL CELL,UR: (no result) /LPF
TOT CO2 ISTAT: 30 MMOL/L (ref 23–32)
TOTAL BILIRUBIN: 0.4 MG/DL (ref 0.2–1)
TOTAL PROTEIN: 7.4 G/DL (ref 6.4–8.2)
TROPONIN BY ISTAT: 0 NG/ML (ref ?–0.08)
TROPONINI: < 0.017 NG/ML (ref 0–0.06)
UROBILINOGEN,URINE: 1 MG/DL
WBC,URINE: 0 /HPF (ref 0–4)

## 2018-02-10 PROCEDURE — 36415 COLL VENOUS BLD VENIPUNCTURE: CPT

## 2018-02-10 PROCEDURE — 82607 VITAMIN B-12: CPT

## 2018-02-10 PROCEDURE — 85025 COMPLETE CBC W/AUTO DIFF WBC: CPT

## 2018-02-10 PROCEDURE — 84443 ASSAY THYROID STIM HORMONE: CPT

## 2018-02-10 PROCEDURE — 83690 ASSAY OF LIPASE: CPT

## 2018-02-10 PROCEDURE — 93005 ELECTROCARDIOGRAM TRACING: CPT

## 2018-02-10 PROCEDURE — 82565 ASSAY OF CREATININE: CPT

## 2018-02-10 PROCEDURE — 84484 ASSAY OF TROPONIN QUANT: CPT

## 2018-02-10 PROCEDURE — 80047 BASIC METABLC PNL IONIZED CA: CPT

## 2018-02-10 PROCEDURE — 93306 TTE W/DOPPLER COMPLETE: CPT

## 2018-02-10 PROCEDURE — 83605 ASSAY OF LACTIC ACID: CPT

## 2018-02-10 PROCEDURE — 80076 HEPATIC FUNCTION PANEL: CPT

## 2018-02-10 PROCEDURE — 99285 EMERGENCY DEPT VISIT HI MDM: CPT

## 2018-02-10 PROCEDURE — 81001 URINALYSIS AUTO W/SCOPE: CPT

## 2018-02-10 PROCEDURE — 80048 BASIC METABOLIC PNL TOTAL CA: CPT

## 2018-02-10 PROCEDURE — 83880 ASSAY OF NATRIURETIC PEPTIDE: CPT

## 2018-02-10 PROCEDURE — 85027 COMPLETE CBC AUTOMATED: CPT

## 2018-02-10 PROCEDURE — 80053 COMPREHEN METABOLIC PANEL: CPT

## 2018-02-10 PROCEDURE — 82962 GLUCOSE BLOOD TEST: CPT

## 2018-02-10 PROCEDURE — 71045 X-RAY EXAM CHEST 1 VIEW: CPT

## 2018-02-10 RX ADMIN — BACITRACIN 1 MLS/HR: 5000 INJECTION, POWDER, FOR SOLUTION INTRAMUSCULAR at 21:45

## 2018-02-10 RX ADMIN — BACITRACIN 1 MLS/HR: 5000 INJECTION, POWDER, FOR SOLUTION INTRAMUSCULAR at 11:45

## 2018-02-10 RX ADMIN — AMIODARONE HYDROCHLORIDE 1 MLS/HR: 50 INJECTION, SOLUTION INTRAVENOUS at 11:44

## 2018-02-10 RX ADMIN — ATORVASTATIN CALCIUM 1 MG: 40 TABLET, FILM COATED ORAL at 20:47

## 2018-02-10 RX ADMIN — AMIODARONE HYDROCHLORIDE 1 MLS/HR: 50 INJECTION, SOLUTION INTRAVENOUS at 11:12

## 2018-02-10 RX ADMIN — AMIODARONE HYDROCHLORIDE 1 MG: 200 TABLET ORAL at 20:47

## 2018-02-10 RX ADMIN — ASPIRIN 81 MG 1 MG: 81 TABLET ORAL at 11:00

## 2018-02-11 LAB
ADD MAN DIFF?: NO
ANION GAP SERPL CALC-SCNC: 8 MMOL/L (ref 6–14)
BASO #: 0 X10^3/UL (ref 0–0.2)
BASO %: 0 % (ref 0–3)
BLOOD UREA NITROGEN: 37 MG/DL (ref 8–26)
CALCIUM: 8.9 MG/DL (ref 8.5–10.1)
CHLORIDE: 99 MMOL/L (ref 98–107)
CO2 SERPL-SCNC: 30 MMOL/L (ref 21–32)
CREAT SERPL-MCNC: 1.7 MG/DL (ref 0.7–1.3)
EOS #: 0.2 X10^3/UL (ref 0–0.7)
EOS %: 2 % (ref 0–3)
GFR SERPLBLD BASED ON 1.73 SQ M-ARVRAT: 40.2 ML/MIN
GLUCOSE SERPL-MCNC: 113 MG/DL (ref 70–99)
HCG SERPL-ACNC: 6.9 X10^3/UL (ref 4–11)
HEMATOCRIT: 27.1 % (ref 39–53)
HEMOGLOBIN: 9.4 G/DL (ref 13–17.5)
LYMPH #: 1.3 X10^3/UL (ref 1–4.8)
LYMPH %: 20 % (ref 24–48)
MEAN CORPUSCULAR HEMOGLOBIN: 30 PG (ref 25–35)
MEAN CORPUSCULAR HGB CONC: 35 G/DL (ref 31–37)
MEAN CORPUSCULAR VOLUME: 86 FL (ref 79–100)
MONO #: 0.5 X10^3/UL (ref 0–1.1)
MONO %: 7 % (ref 0–9)
NEUT #: 4.9 X10^3UL (ref 1.8–7.7)
NEUT %: 71 % (ref 31–73)
PLATELET COUNT: 157 X10^3/UL (ref 140–400)
POC GLUCOSE: 108 MG/DL (ref 70–99)
POC GLUCOSE: 135 MG/DL (ref 70–99)
POC GLUCOSE: 156 MG/DL (ref 70–99)
POC GLUCOSE: 159 MG/DL (ref 70–99)
POTASSIUM SERPL-SCNC: 4.3 MMOL/L (ref 3.5–5.1)
RED BLOOD COUNT: 3.17 X10^6/UL (ref 4.3–5.7)
RED CELL DISTRIBUTION WIDTH: 14.7 % (ref 11.5–14.5)
SODIUM: 137 MMOL/L (ref 136–145)
THYROID STIM HORMONE (TSH): 1.9 UIU/ML (ref 0.36–3.74)

## 2018-02-11 RX ADMIN — TAMSULOSIN HYDROCHLORIDE 1 MG: 0.4 CAPSULE ORAL at 20:16

## 2018-02-11 RX ADMIN — PANTOPRAZOLE SODIUM 1 MG: 40 TABLET, DELAYED RELEASE ORAL at 06:13

## 2018-02-11 RX ADMIN — ATORVASTATIN CALCIUM 1 MG: 40 TABLET, FILM COATED ORAL at 20:16

## 2018-02-11 RX ADMIN — AMIODARONE HYDROCHLORIDE 1 MG: 200 TABLET ORAL at 20:16

## 2018-02-11 RX ADMIN — SERTRALINE 1 MG: 25 TABLET, FILM COATED ORAL at 10:33

## 2018-02-11 RX ADMIN — POTASSIUM CHLORIDE 1 MEQ: 1500 TABLET, EXTENDED RELEASE ORAL at 10:34

## 2018-02-11 RX ADMIN — AMIODARONE HYDROCHLORIDE 1 MG: 200 TABLET ORAL at 10:33

## 2018-02-11 RX ADMIN — PANTOPRAZOLE SODIUM 1 MG: 40 TABLET, DELAYED RELEASE ORAL at 10:34

## 2018-02-11 RX ADMIN — FUROSEMIDE 1 MG: 80 TABLET ORAL at 10:34

## 2018-02-12 LAB
ALBUMIN SERPL-MCNC: 3.6 G/DL (ref 3.4–5)
ALBUMIN/GLOB SERPL: 1.1 {RATIO} (ref 1–1.7)
ALP SERPL-CCNC: 81 U/L (ref 46–116)
ALT (SGPT): 28 U/L (ref 16–63)
ANION GAP SERPL CALC-SCNC: 6 MMOL/L (ref 6–14)
AST SERPL-CCNC: 16 U/L (ref 15–37)
BLOOD UREA NITROGEN: 30 MG/DL (ref 8–26)
BUN/CREAT SERPL: 21 (ref 6–20)
CALCIUM: 8.7 MG/DL (ref 8.5–10.1)
CHLORIDE: 98 MMOL/L (ref 98–107)
CO2 SERPL-SCNC: 34 MMOL/L (ref 21–32)
CREAT SERPL-MCNC: 1.4 MG/DL (ref 0.7–1.3)
GFR SERPLBLD BASED ON 1.73 SQ M-ARVRAT: 50.2 ML/MIN
GLOBULIN SER-MCNC: 3.2 G/DL (ref 2.2–3.8)
GLUCOSE SERPL-MCNC: 160 MG/DL (ref 70–99)
HCG SERPL-ACNC: 9.1 X10^3/UL (ref 4–11)
HEMATOCRIT: 30.5 % (ref 39–53)
HEMOGLOBIN: 10.1 G/DL (ref 13–17.5)
MEAN CORPUSCULAR HEMOGLOBIN: 29 PG (ref 25–35)
MEAN CORPUSCULAR HGB CONC: 33 G/DL (ref 31–37)
MEAN CORPUSCULAR VOLUME: 87 FL (ref 79–100)
PLATELET COUNT: 165 X10^3/UL (ref 140–400)
POC GLUCOSE: 136 MG/DL (ref 70–99)
POC GLUCOSE: 143 MG/DL (ref 70–99)
POC GLUCOSE: 173 MG/DL (ref 70–99)
POTASSIUM SERPL-SCNC: 4.2 MMOL/L (ref 3.5–5.1)
PROSTATE SPECIFIC ANTIGEN: 0.23 NG/ML (ref 0–4)
RED BLOOD COUNT: 3.51 X10^6/UL (ref 4.3–5.7)
RED CELL DISTRIBUTION WIDTH: 14.4 % (ref 11.5–14.5)
SODIUM: 138 MMOL/L (ref 136–145)
TOTAL BILIRUBIN: 0.6 MG/DL (ref 0.2–1)
TOTAL PROTEIN: 6.8 G/DL (ref 6.4–8.2)
VITAMIN-B12: 290 PG/ML (ref 247–911)

## 2018-02-12 RX ADMIN — PANTOPRAZOLE SODIUM 1 MG: 40 TABLET, DELAYED RELEASE ORAL at 16:59

## 2018-02-12 RX ADMIN — AMIODARONE HYDROCHLORIDE 1 MG: 200 TABLET ORAL at 08:40

## 2018-02-12 RX ADMIN — PANTOPRAZOLE SODIUM 1 MG: 40 TABLET, DELAYED RELEASE ORAL at 08:39

## 2018-02-12 RX ADMIN — TAMSULOSIN HYDROCHLORIDE 1 MG: 0.4 CAPSULE ORAL at 10:23

## 2018-02-12 RX ADMIN — SERTRALINE 1 MG: 25 TABLET, FILM COATED ORAL at 08:39

## 2018-02-12 RX ADMIN — MEMANTINE HYDROCHLORIDE 1 MG: 5 TABLET ORAL at 20:47

## 2018-02-12 RX ADMIN — ACETAMINOPHEN 1 MG: 500 TABLET ORAL at 08:39

## 2018-02-12 RX ADMIN — ATORVASTATIN CALCIUM 1 MG: 40 TABLET, FILM COATED ORAL at 20:47

## 2018-02-12 RX ADMIN — DONEPEZIL HYDROCHLORIDE 1 MG: 5 TABLET, FILM COATED ORAL at 20:47

## 2018-02-12 RX ADMIN — MEMANTINE HYDROCHLORIDE 1 MG: 5 TABLET ORAL at 10:23

## 2018-02-12 RX ADMIN — AMIODARONE HYDROCHLORIDE 1 MG: 200 TABLET ORAL at 20:48

## 2018-02-13 LAB
CREAT SERPL-MCNC: 1.3 MG/DL (ref 0.7–1.3)
GFR SERPLBLD BASED ON 1.73 SQ M-ARVRAT: 54.7 ML/MIN
POC GLUCOSE: 120 MG/DL (ref 70–99)
POC GLUCOSE: 127 MG/DL (ref 70–99)
POC GLUCOSE: 145 MG/DL (ref 70–99)
POC GLUCOSE: 182 MG/DL (ref 70–99)

## 2018-02-13 RX ADMIN — PANTOPRAZOLE SODIUM 1 MG: 40 TABLET, DELAYED RELEASE ORAL at 06:22

## 2018-02-13 RX ADMIN — FUROSEMIDE 1 MG: 80 TABLET ORAL at 08:28

## 2018-02-13 RX ADMIN — TAMSULOSIN HYDROCHLORIDE 1 MG: 0.4 CAPSULE ORAL at 08:29

## 2018-02-13 RX ADMIN — POTASSIUM CHLORIDE 1 MEQ: 1500 TABLET, EXTENDED RELEASE ORAL at 08:26

## 2018-02-13 RX ADMIN — PANTOPRAZOLE SODIUM 1 MG: 40 TABLET, DELAYED RELEASE ORAL at 17:11

## 2018-02-13 RX ADMIN — ATORVASTATIN CALCIUM 1 MG: 40 TABLET, FILM COATED ORAL at 21:41

## 2018-02-13 RX ADMIN — DONEPEZIL HYDROCHLORIDE 1 MG: 5 TABLET, FILM COATED ORAL at 21:41

## 2018-02-13 RX ADMIN — ACETAMINOPHEN 1 MG: 500 TABLET ORAL at 08:30

## 2018-02-13 RX ADMIN — AMIODARONE HYDROCHLORIDE 1 MG: 200 TABLET ORAL at 21:42

## 2018-02-13 RX ADMIN — SERTRALINE 1 MG: 25 TABLET, FILM COATED ORAL at 08:28

## 2018-02-13 RX ADMIN — ACETAMINOPHEN 1 MG: 500 TABLET ORAL at 22:49

## 2018-02-13 RX ADMIN — MEMANTINE HYDROCHLORIDE 1 MG: 5 TABLET ORAL at 08:30

## 2018-02-13 RX ADMIN — AMIODARONE HYDROCHLORIDE 1 MG: 200 TABLET ORAL at 08:27

## 2018-02-13 RX ADMIN — MEMANTINE HYDROCHLORIDE 1 MG: 5 TABLET ORAL at 21:41

## 2018-02-14 LAB
POC GLUCOSE: 115 MG/DL (ref 70–99)
POC GLUCOSE: 85 MG/DL (ref 70–99)

## 2018-02-14 RX ADMIN — ACETAMINOPHEN 1 MG: 500 TABLET ORAL at 07:49

## 2018-02-14 RX ADMIN — AMIODARONE HYDROCHLORIDE 1 MG: 200 TABLET ORAL at 07:51

## 2018-02-14 RX ADMIN — PANTOPRAZOLE SODIUM 1 MG: 40 TABLET, DELAYED RELEASE ORAL at 05:36

## 2018-02-14 RX ADMIN — SERTRALINE 1 MG: 25 TABLET, FILM COATED ORAL at 07:49

## 2018-02-14 RX ADMIN — TAMSULOSIN HYDROCHLORIDE 1 MG: 0.4 CAPSULE ORAL at 07:50

## 2018-02-14 RX ADMIN — MEMANTINE HYDROCHLORIDE 1 MG: 5 TABLET ORAL at 07:51

## 2018-02-14 RX ADMIN — AMIODARONE HYDROCHLORIDE 1 MG: 200 TABLET ORAL at 12:00

## 2018-10-05 NOTE — PHYS DOC
Past Medical History


Past Medical History:  A-Fib, CHF, Diabetes-Type II, Heart Disease


Additional Past Medical Histor:  AORTIC VALVE LEAK


Past Surgical History:  Knee Replacement, Tonsillectomy


Additional Past Surgical Histo:  CARDIAC CATH W NO STENT PER PATIENT


Alcohol Use:  None


Drug Use:  None





Adult General


Chief Complaint


Chief Complaint:  RAPID HEART RATE





HPI


HPI





Patient is a 67  year old male presenting to the emergency department for 

evaluation of unstable V. tach.  Patient had been feeling poorly all morning 

long with dizziness shortness of breath nausea and fatigue.  Per EMS he was 

having rapid atrial flutter and runs of V. tach and they called for 

recommendations and I said if he is having unstable V. tach that they need to 

cardiovert him immediately.  He was having episodes of hypotension in the 50-90 

systolic range and was passing out and shortly after cardioverting him he woke 

up and he was back to normal and now he says he feels at his baseline.  Patient 

denies any pain or shortness of breath and his blood pressure is improved in 

the 110 systolic range.





Review of Systems


Review of Systems





Constitutional: Denies fever or chills []


Eyes: Denies change in visual acuity, redness, or eye pain []


HENT: Denies nasal congestion or sore throat []


Respiratory: Denies cough or shortness of breath []


Cardiovascular: No additional information not addressed in HPI []


GI: Denies abdominal pain, nausea, vomiting, bloody stools or diarrhea []


: Denies dysuria or hematuria []


Musculoskeletal: Denies back pain or joint pain []


Integument: Denies rash or skin lesions []


Neurologic: Denies headache, focal weakness or sensory changes []





Allergies


Allergies





Allergies








Coded Allergies Type Severity Reaction Last Updated Verified


 


  Penicillins Allergy Severe RASH AND HIVES 6/8/17 Yes











Physical Exam


Physical Exam





Constitutional: Well developed, well nourished, no acute distress, non-toxic 

appearance. []


HENT: Normocephalic, atraumatic, bilateral external ears normal, oropharynx 

moist, no oral exudates, nose normal. []


Eyes: PERRLA, EOMI, conjunctiva normal, no discharge. [] 


Neck: Normal range of motion, no tenderness, supple, no stridor. [] 


Cardiovascular:Heart rate regular rhythm, no murmur []


Lungs & Thorax:  Bilateral breath sounds clear to auscultation []


Abdomen: Bowel sounds normal, soft, no tenderness, no masses, no pulsatile 

masses. [] 


Skin: Warm, dry, no erythema, no rash. [] 


Back: No tenderness, no CVA tenderness. [] 


Extremities: No tenderness, no cyanosis, no clubbing, ROM intact, no edema. [] 


Neurologic: Alert and oriented X 3, normal motor function, normal sensory 

function, no focal deficits noted. []


Psychologic: Affect normal, judgement normal, mood normal. []





Current Patient Data


Vital Signs





 Vital Signs








  Date Time  Temp Pulse Resp B/P (MAP) Pulse Ox O2 Delivery O2 Flow Rate FiO2


 


7/8/17 10:20 97.7 88 20 103/76 (85) 95 Room Air  





 97.7       








Lab Values





 Laboratory Tests








Test


  7/8/17


10:24 7/8/17


10:26


 


POC Troponin I


  0.00 ng/ml


(<0.08) 


 


 


White Blood Count


  


  6.2 x10^3/uL


(4.0-11.0)


 


Red Blood Count


  


  4.49 x10^6/uL


(4.30-5.70)


 


Hemoglobin


  


  12.6 g/dL


(13.0-17.5)  L


 


POC Hemoglobin


  


  12.9 g/dL


(14-18)  L


 


Hematocrit


  


  38.3 %


(39.0-53.0)  L


 


POC Hematocrit  38 % (37-52)  


 


Mean Corpuscular Volume


  


  85 fL ()


 


 


Mean Corpuscular Hemoglobin  28 pg (25-35)  


 


Mean Corpuscular Hemoglobin


Concent 


  33 g/dL


(31-37)


 


Red Cell Distribution Width


  


  14.3 %


(11.5-14.5)


 


Platelet Count


  


  190 x10^3/uL


(140-400)


 


Neutrophils (%) (Auto)  69 % (31-73)  


 


Lymphocytes (%) (Auto)  20 % (24-48)  L


 


Monocytes (%) (Auto)  6 % (0-9)  


 


Eosinophils (%) (Auto)  4 % (0-3)  H


 


Basophils (%) (Auto)  1 % (0-3)  


 


Neutrophils # (Auto)


  


  4.3 x10^3uL


(1.8-7.7)


 


Lymphocytes # (Auto)


  


  1.3 x10^3/uL


(1.0-4.8)


 


Monocytes # (Auto)


  


  0.4 x10^3/uL


(0.0-1.1)


 


Eosinophils # (Auto)


  


  0.2 x10^3/uL


(0.0-0.7)


 


Basophils # (Auto)


  


  0.1 x10^3/uL


(0.0-0.2)


 


POC Sodium


  


  136 mmol/L


(135-145)


 


Sodium Level


  


  142 mmol/L


(136-145)


 


POC Potassium


  


  5.0 mmol/L


(3.5-5.0)


 


Potassium Level


  


  4.3 mmol/L


(3.5-5.1)


 


POC Chloride


  


  96 mmol/L


()  L


 


Chloride Level


  


  101 mmol/L


()


 


Carbon Dioxide Level


  


  32 mmol/L


(21-32)


 


POC Total CO2


  


  31 mmol/L


(23-32)


 


Anion Gap


  


  15 mmol/L


(6-14)  H


 


POC Blood Urea Nitrogen


  


  45 mg/dL


(8-26)  H


 


Blood Urea Nitrogen


  


  38 mg/dL


(8-26)  H


 


Creatinine


  


  1.5 mg/dL


(0.7-1.3)  H


 


POC Creatinine


  


  1.4 mg/dL


(0.5-1.4)


 


Estimated GFR


(Cockcroft-Gault) 


  46.5  


 


 


BUN/Creatinine Ratio  25 (6-20)  H


 


Glucose Level


  


  207 mg/dL


(70-99)  H


 


Calcium Level


  


  8.9 mg/dL


(8.5-10.1)


 


POC Ionized Calcium (Maverick)


  


  1.08 mmol/L


(1.13-1.32)  L


 


Magnesium Level


  


  1.7 mg/dL


(1.8-2.4)  L


 


Total Bilirubin


  


  0.5 mg/dL


(0.2-1.0)


 


Aspartate Amino Transferase


(AST) 


  19 U/L (15-37)


 


 


Alanine Aminotransferase (ALT)


  


  21 U/L (16-63)


 


 


Alkaline Phosphatase


  


  95 U/L


()


 


Creatine Kinase


  


  46 U/L


()


 


Troponin I Quantitative


  


  < 0.017 ng/mL


(0.000-0.055)


 


NT-Pro-B-Type Natriuretic


Peptide 


  1308 pg/mL


(0-124)  H


 


Total Protein


  


  7.7 g/dL


(6.4-8.2)


 


Albumin


  


  4.1 g/dL


(3.4-5.0)


 


Albumin/Globulin Ratio  1.1 (1.0-1.7)  


 


Thyroid Stimulating Hormone


(TSH) 


  3.048 uIU/mL


(0.358-3.74)





 Laboratory Tests


7/8/17 10:26








 Laboratory Tests


7/8/17 10:26














EKG


EKG


Sinus rhythm at 80 bpm with left bundle branch block in addition to hyperacute 

T waves in leads V1 and V2 with no obvious ST elevation showing ischemia.





Radiology/Procedures


Radiology/Procedures


Chest x-ray is quite abnormal with a widened mediastinum and cardiomegaly there 

is no worsening compared to prior done one week earlier.





Course & Med Decision Making


Course & Med Decision Making


Patient was quite ill but is doing much better now since so I spoke to his 

cardiologist Dr. Mcneil and he agreed with admission to the ICU with 

amiodarone drip.  His primary care provider Dr. Ragland and he agrees with 

admission.  Patient is doing well in the emergency department except for 

transient hypotension with amiodarone.  Patient will be admitted in guarded 

condition to the ICU.





Critical care time of 35 minutes.





Dragon Disclaimer


Dragon Disclaimer


This electronic medical record was generated, in whole or in part, using a 

voice recognition dictation system.





Departure


Departure


Impression:  


 Primary Impression:  


 V-tach


 Additional Impressions:  


 Elevated brain natriuretic peptide (BNP) level


 Hypomagnesemia


 Hypotension


Disposition:  09 ADMITTED AS INPATIENT


Admitting Physician:  Hany Ragland


Condition:  GUARDED


Referrals:  


HANY RAGLAND MD (PCP)





Problem Qualifiers











HANY VAZQUEZ DO Jul 8, 2017 11:29
No
